# Patient Record
Sex: FEMALE | Race: BLACK OR AFRICAN AMERICAN | NOT HISPANIC OR LATINO | Employment: STUDENT | RURAL
[De-identification: names, ages, dates, MRNs, and addresses within clinical notes are randomized per-mention and may not be internally consistent; named-entity substitution may affect disease eponyms.]

---
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
## 2023-03-09 ENCOUNTER — TELEPHONE (OUTPATIENT)
Dept: FAMILY MEDICINE | Facility: CLINIC | Age: 18
End: 2023-03-09
Payer: MEDICAID

## 2023-03-09 ENCOUNTER — DOCUMENTATION ONLY (OUTPATIENT)
Dept: FAMILY MEDICINE | Facility: CLINIC | Age: 18
End: 2023-03-09
Payer: MEDICAID

## 2023-03-09 RX ORDER — TRIAMCINOLONE ACETONIDE 1 MG/G
1 OINTMENT TOPICAL 2 TIMES DAILY
Qty: 80 G | Refills: 0 | Status: SHIPPED | OUTPATIENT
Start: 2023-03-09 | End: 2023-04-18 | Stop reason: SDUPTHER

## 2023-03-09 NOTE — TELEPHONE ENCOUNTER
----- Message from Vernon Hansen sent at 3/9/2023  1:54 PM CST -----  Regarding: Patient is needing a medicine refill  Patient is needing a medicine refill of tricilicone  Please call  [phone]

## 2023-04-18 ENCOUNTER — OFFICE VISIT (OUTPATIENT)
Dept: FAMILY MEDICINE | Facility: CLINIC | Age: 18
End: 2023-04-18
Payer: MEDICAID

## 2023-04-18 VITALS
OXYGEN SATURATION: 98 % | WEIGHT: 180.38 LBS | HEIGHT: 63 IN | TEMPERATURE: 98 F | DIASTOLIC BLOOD PRESSURE: 74 MMHG | HEART RATE: 85 BPM | BODY MASS INDEX: 31.96 KG/M2 | SYSTOLIC BLOOD PRESSURE: 113 MMHG

## 2023-04-18 DIAGNOSIS — L30.9 ECZEMA, UNSPECIFIED TYPE: Primary | ICD-10-CM

## 2023-04-18 PROCEDURE — 3008F PR BODY MASS INDEX (BMI) DOCUMENTED: ICD-10-PCS | Mod: CPTII,,, | Performed by: NURSE PRACTITIONER

## 2023-04-18 PROCEDURE — 99213 PR OFFICE/OUTPT VISIT, EST, LEVL III, 20-29 MIN: ICD-10-PCS | Mod: ,,, | Performed by: NURSE PRACTITIONER

## 2023-04-18 PROCEDURE — 1160F PR REVIEW ALL MEDS BY PRESCRIBER/CLIN PHARMACIST DOCUMENTED: ICD-10-PCS | Mod: CPTII,,, | Performed by: NURSE PRACTITIONER

## 2023-04-18 PROCEDURE — 3078F PR MOST RECENT DIASTOLIC BLOOD PRESSURE < 80 MM HG: ICD-10-PCS | Mod: CPTII,,, | Performed by: NURSE PRACTITIONER

## 2023-04-18 PROCEDURE — 3078F DIAST BP <80 MM HG: CPT | Mod: CPTII,,, | Performed by: NURSE PRACTITIONER

## 2023-04-18 PROCEDURE — 1160F RVW MEDS BY RX/DR IN RCRD: CPT | Mod: CPTII,,, | Performed by: NURSE PRACTITIONER

## 2023-04-18 PROCEDURE — 3008F BODY MASS INDEX DOCD: CPT | Mod: CPTII,,, | Performed by: NURSE PRACTITIONER

## 2023-04-18 PROCEDURE — 3074F SYST BP LT 130 MM HG: CPT | Mod: CPTII,,, | Performed by: NURSE PRACTITIONER

## 2023-04-18 PROCEDURE — 1159F PR MEDICATION LIST DOCUMENTED IN MEDICAL RECORD: ICD-10-PCS | Mod: CPTII,,, | Performed by: NURSE PRACTITIONER

## 2023-04-18 PROCEDURE — 1159F MED LIST DOCD IN RCRD: CPT | Mod: CPTII,,, | Performed by: NURSE PRACTITIONER

## 2023-04-18 PROCEDURE — 3074F PR MOST RECENT SYSTOLIC BLOOD PRESSURE < 130 MM HG: ICD-10-PCS | Mod: CPTII,,, | Performed by: NURSE PRACTITIONER

## 2023-04-18 PROCEDURE — 99213 OFFICE O/P EST LOW 20 MIN: CPT | Mod: ,,, | Performed by: NURSE PRACTITIONER

## 2023-04-18 RX ORDER — TRIAMCINOLONE ACETONIDE 1 MG/G
1 OINTMENT TOPICAL 2 TIMES DAILY
Qty: 80 G | Refills: 6 | Status: SHIPPED | OUTPATIENT
Start: 2023-04-18 | End: 2023-08-17

## 2023-04-18 RX ORDER — CETIRIZINE HYDROCHLORIDE 10 MG/1
10 TABLET ORAL NIGHTLY
Qty: 90 TABLET | Refills: 3 | Status: SHIPPED | OUTPATIENT
Start: 2023-04-18 | End: 2024-04-17

## 2023-04-18 NOTE — LETTER
April 18, 2023      [facility]  [address]  [place] [address]  Phone: [phone]  Fax: [phone]       Patient: Cher Ramos   YOB: 2005  Date of Visit: 04/18/2023    To Whom It May Concern:    Sekou Ramos  was at [facility] on 04/18/2023. The patient may return to work/school on 4/18/2023 with no restrictions. If you have any questions or concerns, or if I can be of further assistance, please do not hesitate to contact me.    Sincerely,    FREDO Wilkins

## 2023-04-18 NOTE — PROGRESS NOTES
FREDO Wilkins   [facility]      PATIENT NAME: Cher Ramos  : 2005  DATE: 23  MRN: [id number]      Patient PCP Information       Provider PCP Type    FREDO Wilkins General            Reason for Visit / Chief Complaint: Medication Refill         History of Present Illness / Problem Focused Workflow     Cher Ramos presents to the clinic with Medication Refill     HPI  16 yo female who presents to clinic with request for medication refill for eczema steroid cream.   Patient reports scaly itchy rash to AC joint and flank area. Patient currently out of prev prescribed triamcinolone.   LMP 3/25/2023    Review of Systems     Review of Systems    Medical / Social / Family History     Past Medical History:   Diagnosis Date    Allergy     Eczema        Past Surgical History:   Procedure Laterality Date    WISDOM TOOTH EXTRACTION Bilateral        Social History  Ms.  reports that she has never smoked. She has never used smokeless tobacco. She reports that she does not drink alcohol and does not use drugs.    Family History  Ms.'s family history includes Diabetes in her maternal uncle; Hypertension in her maternal grandmother; Sickle cell trait in her maternal aunt, maternal grandfather, maternal uncle, and mother.    Medications and Allergies     Medications  Outpatient Medications Marked as Taking for the 23 encounter (Office Visit) with FREDO Wilkins   Medication Sig Dispense Refill    cetirizine (ZYRTEC) 10 MG tablet Take 1 tablet (10 mg total) by mouth every evening. 90 tablet 3    triamcinolone acetonide 0.1% (KENALOG) 0.1 % ointment Apply 1 application topically 2 (two) times daily. Apply to affected area 80 g 6    [DISCONTINUED] triamcinolone acetonide 0.1% (KENALOG) 0.1 % ointment Apply 1 application topically 2 (two) times daily. Apply to affected area 80 g 0       Allergies  Review of patient's allergies indicates:  No Known Allergies    Physical  "Examination     Vitals:    04/18/23 0852   BP: 113/74   BP Location: Left arm   Patient Position: Sitting   BP Method: Large (Automatic)   Pulse: 85   Temp: 97.6 °F (36.4 °C)   TempSrc: Oral   SpO2: 98%   Weight: 81.8 kg (180 lb 6 oz)   Height: 5' 3" (1.6 m)       Physical Exam  Vitals and nursing note reviewed.   Constitutional:       Appearance: Normal appearance. She is normal weight.   HENT:      Head: Normocephalic.      Right Ear: External ear normal.      Left Ear: External ear normal.      Nose: Nose normal.      Mouth/Throat:      Mouth: Mucous membranes are moist.   Eyes:      Extraocular Movements: Extraocular movements intact.      Conjunctiva/sclera: Conjunctivae normal.      Pupils: Pupils are equal, round, and reactive to light.   Cardiovascular:      Rate and Rhythm: Normal rate and regular rhythm.      Pulses: Normal pulses.      Heart sounds: Normal heart sounds. No murmur heard.  Pulmonary:      Effort: Pulmonary effort is normal.      Breath sounds: Normal breath sounds. No stridor. No wheezing or rhonchi.   Abdominal:      General: Bowel sounds are normal. There is no distension.      Palpations: Abdomen is soft. There is no mass.      Tenderness: There is no abdominal tenderness.   Musculoskeletal:         General: No swelling or tenderness. Normal range of motion.      Cervical back: Normal range of motion and neck supple.      Right lower leg: No edema.      Left lower leg: No edema.   Skin:     General: Skin is warm and dry.      Capillary Refill: Capillary refill takes less than 2 seconds.      Findings: Rash present.      Comments: Bilateral AC and left flank eczema   Neurological:      General: No focal deficit present.      Mental Status: She is alert and oriented to person, place, and time. Mental status is at baseline.      Cranial Nerves: No cranial nerve deficit.      Sensory: No sensory deficit.      Motor: No weakness.   Psychiatric:         Mood and Affect: Mood normal.         " Behavior: Behavior normal.         Thought Content: Thought content normal.         Judgment: Judgment normal.         No visits with results within 14 Day(s) from this visit.   Latest known visit with results is:   Clinical Support on 10/11/2022   Component Date Value Ref Range Status    TB Skin Test - 48 hr read 10/13/2022 Negative  Negative Corrected             Assessment and Plan (including Health Maintenance)         Plan:   Eczema, unspecified type  -     triamcinolone acetonide 0.1% (KENALOG) 0.1 % ointment; Apply 1 application topically 2 (two) times daily. Apply to affected area  Dispense: 80 g; Refill: 6  -     cetirizine (ZYRTEC) 10 MG tablet; Take 1 tablet (10 mg total) by mouth every evening.  Dispense: 90 tablet; Refill: 3     RTC prn  There are no Patient Instructions on file for this visit.       There are no preventive care reminders to display for this patient.    Most Recent Immunizations   Administered Date(s) Administered    DTaP 02/25/2008    DTaP / Hep B / IPV 07/13/2004    DTaP / HiB 02/08/2005    HPV 9-Valent 09/28/2021    Hepatitis A 04/20/2009    Hepatitis A, Pediatric/Adolescent, 2 Dose 02/23/2009    MMR 02/25/2008    Meningococcal B, recombinant 09/06/2022    Meningococcal Conjugate (MCV4O) 09/28/2021    Meningococcal Conjugate (MCV4P) 07/20/2017    PPD Test 10/11/2022    Tdap 07/20/2017    Varicella 02/25/2008        Problem List Items Addressed This Visit          Derm    Eczema - Primary    Relevant Medications    triamcinolone acetonide 0.1% (KENALOG) 0.1 % ointment    cetirizine (ZYRTEC) 10 MG tablet       Health Maintenance Topics with due status: Not Due       Topic Last Completion Date    TETANUS VACCINE 07/20/2017    Chlamydia Screening 09/06/2022       No future appointments.           Signature:  FREDO Wilkins  [facility]     Date of encounter: 4/18/23

## 2023-07-07 DIAGNOSIS — M25.561 CHRONIC PAIN OF RIGHT KNEE: Primary | ICD-10-CM

## 2023-07-07 DIAGNOSIS — G89.29 CHRONIC PAIN OF RIGHT KNEE: Primary | ICD-10-CM

## 2023-07-10 ENCOUNTER — HOSPITAL ENCOUNTER (OUTPATIENT)
Dept: RADIOLOGY | Facility: HOSPITAL | Age: 18
Discharge: HOME | End: 2023-07-10
Attending: NURSE PRACTITIONER
Payer: MEDICAID

## 2023-07-10 ENCOUNTER — OFFICE VISIT (OUTPATIENT)
Dept: ORTHOPEDICS | Facility: CLINIC | Age: 18
End: 2023-07-10
Payer: MEDICAID

## 2023-07-10 VITALS
RESPIRATION RATE: 20 BRPM | WEIGHT: 180 LBS | TEMPERATURE: 98 F | SYSTOLIC BLOOD PRESSURE: 116 MMHG | HEIGHT: 64 IN | DIASTOLIC BLOOD PRESSURE: 82 MMHG | BODY MASS INDEX: 30.73 KG/M2 | HEART RATE: 98 BPM | OXYGEN SATURATION: 100 %

## 2023-07-10 DIAGNOSIS — M23.91 INTERNAL DERANGEMENT OF RIGHT KNEE: Primary | ICD-10-CM

## 2023-07-10 DIAGNOSIS — M25.561 CHRONIC PAIN OF RIGHT KNEE: ICD-10-CM

## 2023-07-10 DIAGNOSIS — G89.29 CHRONIC PAIN OF RIGHT KNEE: ICD-10-CM

## 2023-07-10 PROCEDURE — 1159F MED LIST DOCD IN RCRD: CPT | Mod: CPTII,,, | Performed by: NURSE PRACTITIONER

## 2023-07-10 PROCEDURE — 3074F SYST BP LT 130 MM HG: CPT | Mod: CPTII,,, | Performed by: NURSE PRACTITIONER

## 2023-07-10 PROCEDURE — 99214 PR OFFICE/OUTPT VISIT, EST, LEVL IV, 30-39 MIN: ICD-10-PCS | Mod: S$PBB,,, | Performed by: NURSE PRACTITIONER

## 2023-07-10 PROCEDURE — 3079F PR MOST RECENT DIASTOLIC BLOOD PRESSURE 80-89 MM HG: ICD-10-PCS | Mod: CPTII,,, | Performed by: NURSE PRACTITIONER

## 2023-07-10 PROCEDURE — 99214 OFFICE O/P EST MOD 30 MIN: CPT | Mod: S$PBB,,, | Performed by: NURSE PRACTITIONER

## 2023-07-10 PROCEDURE — 1160F PR REVIEW ALL MEDS BY PRESCRIBER/CLIN PHARMACIST DOCUMENTED: ICD-10-PCS | Mod: CPTII,,, | Performed by: NURSE PRACTITIONER

## 2023-07-10 PROCEDURE — 3079F DIAST BP 80-89 MM HG: CPT | Mod: CPTII,,, | Performed by: NURSE PRACTITIONER

## 2023-07-10 PROCEDURE — 99215 OFFICE O/P EST HI 40 MIN: CPT | Mod: PBBFAC | Performed by: NURSE PRACTITIONER

## 2023-07-10 PROCEDURE — 3074F PR MOST RECENT SYSTOLIC BLOOD PRESSURE < 130 MM HG: ICD-10-PCS | Mod: CPTII,,, | Performed by: NURSE PRACTITIONER

## 2023-07-10 PROCEDURE — 73562 X-RAY EXAM OF KNEE 3: CPT | Mod: 26,RT,, | Performed by: STUDENT IN AN ORGANIZED HEALTH CARE EDUCATION/TRAINING PROGRAM

## 2023-07-10 PROCEDURE — 3008F BODY MASS INDEX DOCD: CPT | Mod: CPTII,,, | Performed by: NURSE PRACTITIONER

## 2023-07-10 PROCEDURE — 73562 XR KNEE AP LAT WITH SUNRISE RIGHT: ICD-10-PCS | Mod: 26,RT,, | Performed by: STUDENT IN AN ORGANIZED HEALTH CARE EDUCATION/TRAINING PROGRAM

## 2023-07-10 PROCEDURE — 73562 X-RAY EXAM OF KNEE 3: CPT | Mod: TC,RT

## 2023-07-10 PROCEDURE — 1160F RVW MEDS BY RX/DR IN RCRD: CPT | Mod: CPTII,,, | Performed by: NURSE PRACTITIONER

## 2023-07-10 PROCEDURE — 3008F PR BODY MASS INDEX (BMI) DOCUMENTED: ICD-10-PCS | Mod: CPTII,,, | Performed by: NURSE PRACTITIONER

## 2023-07-10 PROCEDURE — 1159F PR MEDICATION LIST DOCUMENTED IN MEDICAL RECORD: ICD-10-PCS | Mod: CPTII,,, | Performed by: NURSE PRACTITIONER

## 2023-07-10 RX ORDER — MELOXICAM 15 MG/1
15 TABLET ORAL DAILY PRN
Qty: 30 TABLET | Refills: 1 | Status: SHIPPED | OUTPATIENT
Start: 2023-07-10 | End: 2023-08-09

## 2023-07-10 NOTE — LETTER
July 10, 2023      [facility]  [address]  [facility] [address]  Phone: [phone]  Fax: [phone]       Patient: Cher Ramos   YOB: 2005  Date of Visit: 07/10/2023    To Whom It May Concern:    Sekou Ramos  was at [facility] on 07/10/2023. She will NOT be able to return to work until after physical therapy and follow up visit with orthopedic physician (TBD). If you have any questions or concerns, or if I can be of further assistance, please do not hesitate to contact me.    Sincerely,    Yaquelin De La Paz MA

## 2023-07-10 NOTE — PROGRESS NOTES
See Plan of Care     Sup Visit performed today with CORTNEY Medellin and CORTNEY Phillip.  All goals and treatment plan reviewed. Will work toward completion of all goals set.     First Treatment May Include :     E Stim and Game Ready for pain and inflammation     Exercises as follows :     Bike/NuStep  SB  Hamstring Stretch on Stairs   Knee Flexion Stretch on Stairs     Supine :   Quad Sets  Heel Slides   Short Arc Quads   Straight Leg Raises   Hip Abduction   Bridging     Sitting :  Marching   LAQs  Hamstring Curls   Hip Abd/Add  Ankle Pumps     Standing :  Standing Marches  Squat to chair/Shallow standing knee bends  Hip Abduction   Heel Raises     Single Leg Stance - Firm   Single Leg Stance - Foam

## 2023-07-10 NOTE — PATIENT INSTRUCTIONS
Safety guidelines and activity restrictions discussed with the patient.  Verbalized understanding.  Since she does have pain with weight-bearing we will issue her a new set of crutches today.  We will schedule her for physical therapy for range of motion strengthening exercises right knee 2 to 3 times a week x6 weeks.  If she fails to improve she is to call back for scheduling consideration MRI examination.  Prescription Mobic 15 mg 1 p.o. daily p.r.n. right knee pain dispense 30 with 1 refills sent to [facility] pharmacy [facility] per patient request.

## 2023-07-10 NOTE — PROGRESS NOTES
17-year-old female presents ambulatory to orthopedic clinic with complaint of pain in her right knee that has been ongoing.  It is gotten worse over the last 2 days.  She has a perception of knee dislocating several times a day.  She was able to reduce it on her own.  Denies any recent injury or trauma.  States that she bends her knee well.  Her difficulty is trying to fully extend her knee.  She is not had the benefit of NSAID medications.  She does have crutches to however they are in ill repair she is requesting new crutches.  She was a over-the-counter type knee brace on her right knee.      X-ray: X-rays today of the right knee AP, lateral and sunrise view three views shows no evidence of acute fracture, dislocation or pathologic bone noted.    PE:  Physical exam she is noted be ambulating nonweightbearing right lower extremity with crutches.  She was noted have a knee brace in place.  Knee brace removed.  Physical exam right knee skin is warm, dry and intact.  No soft tissue swelling noted.  There is minimal intra-articular effusion appreciated clinically.  Active range of motion she lacks approximately 30° full extension.  Flexion is able to flex to approximately 100°.  There was excellent ligamentous balance instability noted clinically.  Passive range of motion extension lacks approximately 20° for full extension with further flexion to approximately 95°.    Impression:  Internal derangement right knee    Plan:  Safety guidelines and activity restrictions discussed with the patient.  Verbalized understanding.  Since she does have pain with weight-bearing we will issue her a new set of crutches today.  We will schedule her for physical therapy for range of motion strengthening exercises right knee 2 to 3 times a week x6 weeks.  If she fails to improve she is to call back for scheduling consideration MRI examination.  Prescription Mobic 15 mg 1 p.o. daily p.r.n. right knee pain dispense 30 with 1 refills  sent to [facility] pharmacy [facility] per patient request.

## 2023-07-11 ENCOUNTER — CLINICAL SUPPORT (OUTPATIENT)
Dept: REHABILITATION | Facility: HOSPITAL | Age: 18
End: 2023-07-11
Payer: MEDICAID

## 2023-07-11 DIAGNOSIS — R29.898 WEAKNESS OF RIGHT LEG: ICD-10-CM

## 2023-07-11 DIAGNOSIS — M23.91 ACUTE INTERNAL DERANGEMENT OF RIGHT KNEE: Primary | ICD-10-CM

## 2023-07-11 DIAGNOSIS — M23.91 INTERNAL DERANGEMENT OF RIGHT KNEE: ICD-10-CM

## 2023-07-11 DIAGNOSIS — M25.661 DECREASED RANGE OF MOTION (ROM) OF RIGHT KNEE: ICD-10-CM

## 2023-07-11 DIAGNOSIS — M25.561 ACUTE PAIN OF RIGHT KNEE: ICD-10-CM

## 2023-07-11 PROCEDURE — 97161 PT EVAL LOW COMPLEX 20 MIN: CPT

## 2023-07-11 NOTE — PLAN OF CARE
"[facility]   Physical Therapy Initial Evaluation      Name: Cher Ramos  Clinic Number: [id number]    Therapy Diagnosis: Internal Derangement Right Knee  Physician: Eugenio Aviles FNP    Physician Orders: PT Eval and Treat   Medical Diagnosis from Referral: Right Knee Pain  Evaluation Date: 7/11/2023  Authorization Period Expiration: Medicaid Managed   Plan of Care Expiration: 9/8/2023   Progress Note Due: As Needed   Visit # / Visits authorized: 1/ Medicaid Managed    FOTO: 69/100    Medicaid was Approved and is as follows :      [id number]    Based on the information submitted, the followingservices have been approved:  Procedure Code Effective Date Expiration Date Requested Units Approved Units  39924 07/17/2023 09/08/2023 32 32  [id number] 07/17/2023 09/08/2023 16 16  [id number] 07/17/2023 09/08/2023 16 16  [id number] 07/17/2023 09/08/2023 16 16  [id number] 07/17/2023 09/08/2023 16 16  The treatmentauthorization number (TAN) for thisapproval is: [id number]    Precautions: She is allowed to weight bear as tolerated     Time In: 11:00 am   Time Out: 11:57 am   Total Appointment Time (timed & untimed codes): 57 minutes    Subjective     Date of onset: 7/7/2023    History of current condition - Cher reports: She was playing kickball one year ago and fell in a hole. Her knee popped at that time and was painful but she never saw a doctor. It has given her problems off and on since that time, but she has been able to walk with no difficulty. However, on Friday, 7/7/2023, she was standing at work and pivoted on her Right knee and felt it "pop." She had severe pain and swelling at that time and was unable to bear weight on that leg at all.  Most of her pain is medial. At this time, she can bend the knee without too much pain but she is unable to straighten it. She is on crutches today and states she is unable to put much pressure through that Right knee when she tries to walk. She has pain that " is usually around 7/10. She has seen Dr Eugenio Aviles in Dr Lozano's office. No MRI has been done but she was referred to Outpatient Physical Therapy.     Patient saw Eugenio Aviles at Dr Lozano's office on 7/10/2023. MD Note States in Part :   17-year-old female presents ambulatory to orthopedic clinic with complaint of pain in her right knee that has been ongoing.  It is gotten worse over the last 2 days.  She has a perception of knee dislocating several times a day.  She was able to reduce it on her own.  Denies any recent injury or trauma.  States that she bends her knee well.  Her difficulty is trying to fully extend her knee.  She is not had the benefit of NSAID medications.  She does have crutches to however they are in ill repair she is requesting new crutches.  She was a over-the-counter type knee brace on her right knee.    Active range of motion she lacks approximately 30° full extension. Flexion is able to flex to approximately 100°. There was excellent ligamentous balance instability noted clinically. Passive range of motion extension lacks approximately 20° for full extension with further flexion to approximately 95°.   Safety guidelines and activity restrictions discussed with the patient. Verbalized understanding. Since she does have pain with weight-bearing we will issue her a new set of crutches today. We will schedule her for physical therapy for range of motion strengthening exercises right knee 2 to 3 times a week x6 weeks. If she fails to improve she is to call back for scheduling consideration MRI examination. Prescription Mobic 15 mg 1 p.o. daily p.r.n.       Falls: None recently     Imaging: X Ray:   No acute fracture or dislocation.  No joint abnormality.  No radiopaque foreign bodies.       Prior Therapy: None for this   Social History:  lives with an adult   Occupation: [employer]   Prior Level of Function: Independent and Pain free  Current Level of Function:  "Unable to stand and walk on the Right Knee    Pain:  Current 7/10, worst 10/10, best 6/10   Location: right knee    Description: Aching, Throbbing, Tight, and Sharp  Aggravating Factors: trying to extend the Right Knee and any attempts to weight bear  Easing Factors: relaxation, pain medication, ice, and rest    Patients goals: "I want to figure out what is wrong with this knee and get it fixed."     Medical History:   Past Medical History:   Diagnosis Date    Allergy     Eczema        Surgical History:   Cher Ramos  has a past surgical history that includes [place] tooth extraction (Bilateral).    Medications:   Cher has a current medication list which includes the following prescription(s): cetirizine, meloxicam, and triamcinolone acetonide 0.1%.    Allergies:   Review of patient's allergies indicates:  No Known Allergies     Objective          Observation : Patient here today for Outpatient Physical Therapy Evaluation. She has bilateral axillary crutches as she is unable to bear weight through the Right Lower Extremity at this time. Patient is wearing an over the counter supportive knee brace that she states helps some with the pain.     Range of Motion/Strength :                  Left Extremity                                                                        Right Extremity   AROM PROM Strength  Location  AROM    PROM   Strength                          140   5/5   Knee    Flexion (140)  95  100  3-/5    +2   5/5                Extension (0)  -30  -20  5-/5             Normal      Ankle   Dorsiflexion (20)  Normal    5/5                     Plantar Flexion (50)                        Inversion (35)                        Eversion (25)                 Knee Special Tests :     B. Knee  Lochman's test: right Negative left Negative  Anterior drawer: right Negative left Negative  Posterior drawer: right Negative left Negative  Varus stress test: right Negative left Negative  Valgus stress test: " "right Negative left Negative  PFJ grind test: right Positive left Negative  McMurrays: right Positive left Negative  Thessaly's: right Positive left Negative    Functional Impairments :  Patient is unable to weight bear at this time therefore she is using axillary crutches for a swing through gait pattern.     Palpation : She is tender to palpation along the medial joint line and the pes anserine. Medial, Superior, and Inferior border of the patella is also very tender to palpation.    Limitation/Restriction for FOTO Intake Knee Survey    Therapist reviewed FOTO scores for Cher Ramos on 7/11/2023.   FOTO documents entered into [employer] - see Media section.    Limitation Score: 31%         Patient Education     Education provided:   - Discussed the findings from the Evaluation and Reviewed the Plan of Care    Assessment     Cher is a 17 y.o. female referred to outpatient Physical Therapy with a medical diagnosis of Internal derangement of Right Knee causing Right knee pain. Cher reports: She was playing kickball one year ago and fell in a hole. Her knee popped at that time and was painful but she never saw a doctor. It has given her problems off and on since that time, but she has been able to walk with no difficulty. However, on Friday, 7/7/2023, she was standing at work and pivoted on her Right knee and felt it "pop." She had severe pain and swelling at that time and was unable to bear weight on that leg at all.  Most of her pain is medial. At this time, she can bend the knee without too much pain but she is unable to straighten it. She is on crutches today and states she is unable to put much pressure through that Right knee when she tries to walk. She has pain that is usually around 7/10. She has seen Dr Eugenio Aviles in Dr Lozano's office. No MRI has been done but she was referred to Outpatient Physical Therapy.   Patient presents with decreased Range of Motion in the Right knee, especially in " regards to Extension. She is unable to get the Right Knee straight at all and lacks 20 degrees of extension even with assistance of the Therapist. Patient was positive for possible medial meniscus tear during the special tests during the Evaluation. Unable to determine if the patella is stable or not at this time as she is very guarded and painful at this time. She is tender to palpation along the medial joint line and the pes anserine. Medial, Superior, and Inferior border of the patella is also very tender to palpation. Feel patient would benefit from MRI or other testing to help determine the cause of her pain and dysfunction. She is unable to bear weight through the Right Lower Extremity at this time and is having to use axillary crutches with swing through gait for mobility.   Patient will require Physical Therapy Intervention to address all deficits and work toward completion of all goals set. Therapist will refer patient back to MD upon completion of Therapy for further assessment and possible MRI if pain and dysfunction persist.     Patient prognosis is Fair.   Patient will benefit from skilled outpatient Physical Therapy to address the deficits stated above and in the chart below, provide patient /family education, and to maximize patientt's level of independence.     Plan of care discussed with patient: Yes  Patient's spiritual, cultural and educational needs considered and patient is agreeable to the plan of care and goals as stated below:     Anticipated Barriers for therapy: Unknown cause of pain    Medical Necessity is demonstrated by the following  History  Co-morbidities and personal factors that may impact the plan of care [] LOW: no personal factors / co-morbidities  [x] MODERATE: 1-2 personal factors / co-morbidities  [] HIGH: 3+ personal factors / co-morbidities    Moderate / High Support Documentation:   Co-morbidities affecting plan of care: Previous knee injury 1 year ago     Personal Factors:    Unknown cause of pain     Examination  Body Structures and Functions, activity limitations and participation restrictions that may impact the plan of care [x] LOW: addressing 1-2 elements  [] MODERATE: 3+ elements  [] HIGH: 4+ elements (please support below)    Moderate / High Support Documentation: N/A     Clinical Presentation [] LOW: stable  [x] MODERATE: Evolving - May need surgery if this pain does not resolve  [] HIGH: Unstable     Decision Making/ Complexity Score: low       Goals:  Short Term Goals: 4 weeks   1. Independent with Home Exercise Program   2. Increase Right Knee Range of Motion to 0 Degrees to 120 Degrees  3. Increase Right Knee Strength to grossly 4/5  4. Patient will ambulate 500 feet with one crutch and with complaints of pain Less than or Equal to 4/10.      Long Term Goals: 8 weeks   1. Patient will increase Right Knee Strength to grossly 5/5  2. Patient will ambulate 1000+ feet without an assistive device and with complaints of pain Less than or Equal to 3/10.   3. Refer patient back to MD at end of Therapy for further assessment and possible MRI if pain and dysfunction persist.          Plan     Plan of care Certification: 7/11/2023 to 9/8/2023.    Outpatient Physical Therapy 2 times weekly for 8 weeks to include the following interventions: Electrical Stimulation to increase strength and decrease complaints of pain and inflammation as able, Game Ready/Vasopneumatic, Manual Therapy, Moist Heat/ Ice, Neuromuscular Re-ed, Patient Education, Therapeutic Activities, and Therapeutic Exercise.     ERIKA ROME PT, DPT        ----------------------------------------------------------------------------------------------------------------------------------------------------------------    Medicaid requirements:  Plan of Care Dates:  7/11/2023 to 9/8/2023.  CPT codes and number of units needed per visit:  66756 : 4 Units/week x 8 weeks =32 units  76232 : 2 Units/week x 8 weeks = 16  units  11419 : 2 Units/week x 8 weeks = 16 units   42623 : 2 Units/week x 8 weeks = 16 units   14106 : 2 Units/week x 8 weeks = 16 units   Last face to face visit with MD: 7/10/2023  Goals:  Short Term Goals: 4 weeks   1. Independent with Home Exercise Program   2. Increase Right Knee Range of Motion to 0 Degrees to 120 Degrees  3. Increase Right Knee Strength to grossly 4/5  4. Patient will ambulate 500 feet with one crutch and with complaints of pain Less than or Equal to 4/10.      Long Term Goals: 8 weeks   1. Patient will increase Right Knee Strength to grossly 5/5  2. Patient will ambulate 1000+ feet without an assistive device and with complaints of pain Less than or Equal to 3/10.   3. Refer patient back to MD at end of Therapy for further assessment and possible MRI if pain and dysfunction persist.      Home exercise program and patient compliance: Home Exercise Program initiated at time of Evaluation. This will be upgraded during upcoming treatment sessions. Will educate patient on proper form and compliance. Patient will be Independent and compliant with the Home Exercise Program.    Discharge Plan: Will work toward completion of all goals set and patient will be Independent and compliant with the Home Exercise Program.

## 2023-07-17 ENCOUNTER — CLINICAL SUPPORT (OUTPATIENT)
Dept: REHABILITATION | Facility: HOSPITAL | Age: 18
End: 2023-07-17
Payer: MEDICAID

## 2023-07-17 DIAGNOSIS — M25.661 DECREASED RANGE OF MOTION (ROM) OF RIGHT KNEE: ICD-10-CM

## 2023-07-17 DIAGNOSIS — M23.91 ACUTE INTERNAL DERANGEMENT OF RIGHT KNEE: Primary | ICD-10-CM

## 2023-07-17 DIAGNOSIS — M25.561 ACUTE PAIN OF RIGHT KNEE: ICD-10-CM

## 2023-07-17 DIAGNOSIS — R29.898 WEAKNESS OF RIGHT LEG: ICD-10-CM

## 2023-07-17 PROCEDURE — 97110 THERAPEUTIC EXERCISES: CPT

## 2023-07-17 PROCEDURE — 97016 VASOPNEUMATIC DEVICE THERAPY: CPT

## 2023-07-17 PROCEDURE — 97112 NEUROMUSCULAR REEDUCATION: CPT

## 2023-07-17 NOTE — PROGRESS NOTES
[facility]   Physical Therapy Treatment Note      Name: Cher Ramos  Clinic Number: [id number]    Therapy Diagnosis:   Encounter Diagnoses   Name Primary?    Acute internal derangement of right knee Yes    Decreased range of motion (ROM) of right knee     Weakness of right leg     Acute pain of right knee      Physician: Eugenio Aviles FNP    Visit Date: 7/17/2023      Physician Orders: PT Eval and Treat   Medical Diagnosis from Referral: Right Knee Pain  Evaluation Date: 7/11/2023  Authorization Period Expiration: Medicaid Managed   Plan of Care Expiration: 9/8/2023   Progress Note Due: As Needed   Visit # / Visits authorized: 2/ Medicaid Managed    FOTO: 69/100  PTA Visit #: 0/5     Time In: 1118  Time Out: 1218  Total Billable Time: 60 minutes    Subjective     Pt reports: I don't have pain unless I try to extend my knee.    She was compliant with home exercise program.    Pain: 0/10  Location: right knee      Objective      Extension AROM -12  Quad lag -20  Knee Flexion 120 AROM, 125 PROM    Treatment     Cher received the treatments listed below:      therapeutic exercises to develop strength, endurance, ROM, flexibility, posture, and core stabilization for 15 minutes including:  Bike x 5 min  Slant board stretch 3 x 30 sec  Heel slides with overpressure x 10    neuromuscular re-education activities to improve: Balance, Coordination, Kinesthetic, Sense, Proprioception, and Posture for 25 minutes. The following activities were included:  Quad set x 20  Short arc quad x 20 w/strap  Straight leg raise x 20 with strap  Bridging x 20  Right hip abduction x 20    supervised modalities after being cleared for contradictions: IFC Electrical Stimulation:  Cher received IFC Electrical Stimulation for pain control applied to the right knee. Pt received stimulation at 100 % scan at a frequency of 12 for 15 minutes. Cher tolderated treatment well without any adverse  effects.    Vasopneumatic during IFC x 10 minutes to right knee          therapeutic activities to improve functional performance for -  minutes, including:  -  manual therapy techniques: Joint mobilizations, Myofacial release, and Soft tissue Mobilization were applied to the: - for - minutes, including:  -  gait training to improve functional mobility and safety for -  minutes, including:  -    direct contact modalities after being cleared for contraindications:     hot pack for - minutes to -.    cold pack for - minutes to -.    Patient Education and Home Exercises       Education provided:   - added to home exercise program 7/17    Written Home Exercises Provided: yes. Exercises were reviewed and Cher was able to demonstrate them prior to the end of the session.  Cher demonstrated good  understanding of the education provided. See EMR under Patient Instructions for exercises provided during therapy sessions    Assessment     Patient arrived with knee brace on and bilateral crutches. Patient states she feels all stretches along the anterior knee. The bridge exercise caused 'twinging' but patient states it is a good feeling since it feels like she is strengthening. Patient has made gains with range of motion and with quad lag measurements. Added to home exercise program today. Finished with modalities for pain and inflammation control. Progress as able.     Cher Is progressing well towards her goals.   Pt prognosis is Good.     Pt will continue to benefit from skilled outpatient physical therapy to address the deficits listed in the problem list box on initial evaluation, provide pt/family education and to maximize pt's level of independence in the home and community environment.     Pt's spiritual, cultural and educational needs considered and pt agreeable to plan of care and goals.     Anticipated barriers to physical therapy: none     Goals: Short Term   Goals: 4 weeks   1. Independent with Home  Exercise Program   2. Increase Right Knee Range of Motion to 0 Degrees to 120 Degrees  3. Increase Right Knee Strength to grossly 4/5  4. Patient will ambulate 500 feet with one crutch and with complaints of pain Less than or Equal to 4/10.       Long Term Goals: 8 weeks   1. Patient will increase Right Knee Strength to grossly 5/5  2. Patient will ambulate 1000+ feet without an assistive device and with complaints of pain Less than or Equal to 3/10.   3. Refer patient back to MD at end of Therapy for further assessment and possible MRI if pain and dysfunction persist.        Plan     Continue to progress toward goals     ADAM BLANDON, PT      (Copied from initial evaluation)  See Plan of Care   First Treatment May Include :     E Stim and Game Ready for pain and inflammation     Exercises as follows :     Bike/NuStep  SB  Hamstring Stretch on Stairs   Knee Flexion Stretch on Stairs     Supine :   Quad Sets  Heel Slides   Short Arc Quads   Straight Leg Raises   Hip Abduction   Bridging     Sitting :  Marching   LAQs  Hamstring Curls   Hip Abd/Add  Ankle Pumps     Standing :  Standing Marches  Squat to chair/Shallow standing knee bends  Hip Abduction   Heel Raises     Single Leg Stance - Firm   Single Leg Stance - Foam

## 2023-07-19 ENCOUNTER — CLINICAL SUPPORT (OUTPATIENT)
Dept: REHABILITATION | Facility: HOSPITAL | Age: 18
End: 2023-07-19
Payer: MEDICAID

## 2023-07-19 DIAGNOSIS — M25.661 DECREASED RANGE OF MOTION (ROM) OF RIGHT KNEE: ICD-10-CM

## 2023-07-19 DIAGNOSIS — M23.91 ACUTE INTERNAL DERANGEMENT OF RIGHT KNEE: Primary | ICD-10-CM

## 2023-07-19 DIAGNOSIS — M25.561 ACUTE PAIN OF RIGHT KNEE: ICD-10-CM

## 2023-07-19 DIAGNOSIS — R29.898 WEAKNESS OF RIGHT LEG: ICD-10-CM

## 2023-07-19 PROCEDURE — 97110 THERAPEUTIC EXERCISES: CPT | Mod: CQ

## 2023-07-19 PROCEDURE — 97112 NEUROMUSCULAR REEDUCATION: CPT | Mod: CQ

## 2023-07-19 PROCEDURE — 97016 VASOPNEUMATIC DEVICE THERAPY: CPT | Mod: CQ

## 2023-07-19 PROCEDURE — 97116 GAIT TRAINING THERAPY: CPT | Mod: CQ

## 2023-07-19 NOTE — PROGRESS NOTES
[facility]   Physical Therapy Treatment Note      Name: Cher Ramos  Clinic Number: [id number]    Therapy Diagnosis:   Encounter Diagnoses   Name Primary?    Acute internal derangement of right knee Yes    Decreased range of motion (ROM) of right knee     Weakness of right leg     Acute pain of right knee      Physician: Eugenio Aviles FNP    Visit Date: 7/19/2023      Physician Orders: PT Eval and Treat   Medical Diagnosis from Referral: Right Knee Pain  Evaluation Date: 7/11/2023  Authorization Period Expiration: Medicaid Managed   Plan of Care Expiration: 9/8/2023   Progress Note Due: As Needed   Visit # / Visits authorized: 3/ Medicaid Managed    FOTO: 69/100  PTA Visit #: 1/5     Time In: 1:00 pm  Time Out: 1:57 pm  Total Billable Time: 57  minutes    Subjective     Pt reports: I feel better after doing the exercises I got last visit.     She was compliant with home exercise program.    Pain: 1/10  Location: right knee      Objective      Extension AROM -9  Quad lag -17  Knee Flexion 125 AROM, 130 PROM    Treatment     Cher received the treatments listed below:      therapeutic exercises to develop strength, endurance, ROM, flexibility, posture, and core stabilization for 15 minutes including:  nustep x 5 min  Slant board stretch 3 x 30 second  Hamstring stretch on step 2 x 20 second hold   Lunge stretch on step 2 x 20 second hold   Knee flexion ball rolls with red peanut x 20  Heel raises at rail using bilateral upper extremity to offload weight x 20    neuromuscular re-education activities to improve: Balance, Coordination, Kinesthetic, Sense, Proprioception, and Posture for 15 minutes. The following activities were included:  Quad set x 20 with 3 second hold   Short arc quad x 20 w/strap   Straight leg raise x 20 with strap  Bridging x 20 with green band added 7/19    supervised modalities after being cleared for contradictions: IFC Electrical Stimulation:   "Cher received IFC Electrical Stimulation for pain control applied to the right knee. Pt received stimulation at 100 % scan at a frequency of 12 for 15 minutes. Cher tolderated treatment well without any adverse effects.    Vasopneumatic during IFC x 15 minutes to right knee      therapeutic activities to improve functional performance for -  minutes, including:  -  manual therapy techniques: Joint mobilizations, Myofacial release, and Soft tissue Mobilization were applied to the: - for - minutes, including:  -  gait training to improve functional mobility and safety for 8 minutes, including:  Using axillary crutch on left side with emphasis on advancing RLE and crutch on left side together. Cues to heel strike and not land with foot flat     direct contact modalities after being cleared for contraindications:     hot pack for - minutes to -.    cold pack for - minutes to -.    Patient Education and Home Exercises       Education provided:   - added to home exercise program 7/17    Written Home Exercises Provided: yes. Exercises were reviewed and Cher was able to demonstrate them prior to the end of the session.  Cher demonstrated good  understanding of the education provided. See EMR under Patient Instructions for exercises provided during therapy sessions    Assessment     Patient arrived with single axillary crutch today and reports the exercises given at previous visit have helped her knee move more. She did arrive with the crutch on the right side, so therapist corrected Patient on use of crutch placed on the left to offload weight from RLE while ambulating. Cues needed for heel strike and toe off since she tended to compensate with foot flat landing due to lacking full extension in the right knee. She did state this correction helped her to walk better using the crutch on the left with less pain noted in the right knee. She did not report any "twinges" in the knee when we did the bridging today " "and even tolerated addition of green Theraband to bridges. Finished with modalities for pain and inflammation control. Progress as able when Patient returns.     PMH from evaluation:  Cher is a 17 y.o. female referred to outpatient Physical Therapy with a medical diagnosis of Internal derangement of Right Knee causing Right knee pain. Cher reports: She was playing kickball one year ago and fell in a hole. Her knee popped at that time and was painful but she never saw a doctor. It has given her problems off and on since that time, but she has been able to walk with no difficulty. However, on Friday, 7/7/2023, she was standing at work and pivoted on her Right knee and felt it "pop." She had severe pain and swelling at that time and was unable to bear weight on that leg at all.  Most of her pain is medial. At this time, she can bend the knee without too much pain but she is unable to straighten it. She is on crutches today and states she is unable to put much pressure through that Right knee when she tries to walk. She has pain that is usually around 7/10. She has seen Dr Eugenio Aviles in Dr Lozano's office. No MRI has been done but she was referred to Outpatient Physical Therapy.   Patient presents with decreased Range of Motion in the Right knee, especially in regards to Extension. She is unable to get the Right Knee straight at all and lacks 20 degrees of extension even with assistance of the Therapist. Patient was positive for possible medial meniscus tear during the special tests during the Evaluation. Unable to determine if the patella is stable or not at this time as she is very guarded and painful at this time. She is tender to palpation along the medial joint line and the pes anserine. Medial, Superior, and Inferior border of the patella is also very tender to palpation. Feel patient would benefit from MRI or other testing to help determine the cause of her pain and dysfunction. She is unable to " bear weight through the Right Lower Extremity at this time and is having to use axillary crutches with swing through gait for mobility.   Cher Is progressing well towards her goals.   Pt prognosis is Good.     Pt will continue to benefit from skilled outpatient physical therapy to address the deficits listed in the problem list box on initial evaluation, provide pt/family education and to maximize pt's level of independence in the home and community environment.     Pt's spiritual, cultural and educational needs considered and pt agreeable to plan of care and goals.     Anticipated barriers to physical therapy: none     Goals: Short Term   Goals: 4 weeks   1. Independent with Home Exercise Program   2. Increase Right Knee Range of Motion to 0 Degrees to 120 Degrees  3. Increase Right Knee Strength to grossly 4/5  4. Patient will ambulate 500 feet with one crutch and with complaints of pain Less than or Equal to 4/10.       Long Term Goals: 8 weeks   1. Patient will increase Right Knee Strength to grossly 5/5  2. Patient will ambulate 1000+ feet without an assistive device and with complaints of pain Less than or Equal to 3/10.   3. Refer patient back to MD at end of Therapy for further assessment and possible MRI if pain and dysfunction persist.        Plan      Plan of care Certification: 7/11/2023 to 9/8/2023.     Outpatient Physical Therapy 2 times weekly for 8 weeks to include the following interventions: Electrical Stimulation to increase strength and decrease complaints of pain and inflammation as able, Game Ready/Vasopneumatic, Manual Therapy, Moist Heat/ Ice, Neuromuscular Re-ed, Patient Education, Therapeutic Activities, and Therapeutic Exercise.   Continue to progress toward goals     Seng Bright, PTA    7/19/2023

## 2023-07-25 ENCOUNTER — CLINICAL SUPPORT (OUTPATIENT)
Dept: REHABILITATION | Facility: HOSPITAL | Age: 18
End: 2023-07-25
Payer: MEDICAID

## 2023-07-25 DIAGNOSIS — M25.561 ACUTE PAIN OF RIGHT KNEE: ICD-10-CM

## 2023-07-25 DIAGNOSIS — M23.91 ACUTE INTERNAL DERANGEMENT OF RIGHT KNEE: Primary | ICD-10-CM

## 2023-07-25 DIAGNOSIS — R29.898 WEAKNESS OF RIGHT LEG: ICD-10-CM

## 2023-07-25 DIAGNOSIS — M25.661 DECREASED RANGE OF MOTION (ROM) OF RIGHT KNEE: ICD-10-CM

## 2023-07-25 PROCEDURE — 97112 NEUROMUSCULAR REEDUCATION: CPT | Mod: CQ

## 2023-07-25 PROCEDURE — 97110 THERAPEUTIC EXERCISES: CPT | Mod: CQ

## 2023-07-25 PROCEDURE — 97014 ELECTRIC STIMULATION THERAPY: CPT | Mod: CQ

## 2023-07-25 NOTE — PROGRESS NOTES
[facility]   Physical Therapy Treatment Note      Name: Cher Ramos  Clinic Number: [id number]    Therapy Diagnosis:   Encounter Diagnoses   Name Primary?    Acute internal derangement of right knee Yes    Decreased range of motion (ROM) of right knee     Weakness of right leg     Acute pain of right knee      Physician: Eugenio Aviles FNP    Visit Date: 7/25/2023      Physician Orders: PT Eval and Treat   Medical Diagnosis from Referral: Right Knee Pain  Evaluation Date: 7/11/2023  Authorization Period Expiration: Medicaid Managed   Plan of Care Expiration: 9/8/2023   Progress Note Due: As Needed   Visit # / Visits authorized: 4/ Medicaid Managed    FOTO: 69/100  PTA Visit #: 1/5     Time In: 11:32 am  Time Out: 12:18 pm  Total Billable Time: 46 minutes    Subjective     Pt reports: I feel ok today but am sore due to trying to walk more and did not use my crutch in the store yesterday so my calf is stiff and sore.  States she iced it last night.     She was compliant with home exercise program.    Pain: 2/10  Location: right knee      Objective      Extension AROM -10  Quad lag -13  Knee Flexion 125 AROM, 130 PROM    Treatment     Cher received the treatments listed below:      therapeutic exercises to develop strength, endurance, ROM, flexibility, posture, and core stabilization for 11 minutes including:  nustep x 6 min  Slant board stretch 3 x 30 second  Hamstring stretch in supine with green strap 3 x 20 second hold     neuromuscular re-education activities to improve: Balance, Coordination, Kinesthetic, Sense, Proprioception, and Posture for 12 minutes. The following activities were included:  Quad sets x 6 minutes  with neuromuscular electrical stimulation   Straight leg raise x 6 minutes with neuromuscular electrical stimulation     Patient received the following supervised modalities after being cleared for contradictions: Pre-Mod Electrical Stimulation:   Patient received Pre-Mod Electrical Stimulation for pain control applied to the right knee. Pt received stimulation at a frequency of  Hz for 15 minutes. Patient tolerated treatment well without any adverse effects.  cold pack for 15 minutes to right knee in conjunction with PREMOD    gait training to improve functional mobility and safety for 0 minutes, including:  Using axillary crutch on left side with emphasis on advancing RLE and crutch on left side together. Cues to heel strike and not land with foot flat     direct contact modalities after being cleared for contraindications:     hot pack for - minutes to -.        Patient Education and Home Exercises       Education provided:   - added to home exercise program 7/17    Written Home Exercises Provided: yes. Exercises were reviewed and Cher was able to demonstrate them prior to the end of the session.  Cher demonstrated good  understanding of the education provided. See EMR under Patient Instructions for exercises provided during therapy sessions    Assessment     Patient arrived with single axillary crutch today and reports she has some medial knee pain today and states she is sore from going into a store yesterday without the crutch. Therapist did opt to try russian neuromuscular electrical stimulation today with quad sets and SLR using the strap. She tolerated this very well and did not state any increase in pain. Patient's mother came with her today and was inquiring about her progress so therapist explained the importance of maximizing her functional mobility and strength in that right knee due to the severity of the injury. Finished with PREMOD and CP to right knee for pain and inflammation control. Progress as able when Patient returns.     PMH from evaluation:  Cher is a 17 y.o. female referred to outpatient Physical Therapy with a medical diagnosis of Internal derangement of Right Knee causing Right knee pain. Cher reports: She  "was playing kickball one year ago and fell in a hole. Her knee popped at that time and was painful but she never saw a doctor. It has given her problems off and on since that time, but she has been able to walk with no difficulty. However, on Friday, 7/7/2023, she was standing at work and pivoted on her Right knee and felt it "pop." She had severe pain and swelling at that time and was unable to bear weight on that leg at all.  Most of her pain is medial. At this time, she can bend the knee without too much pain but she is unable to straighten it. She is on crutches today and states she is unable to put much pressure through that Right knee when she tries to walk. She has pain that is usually around 7/10. She has seen Dr Eugenio Aviles in Dr Lozano's office. No MRI has been done but she was referred to Outpatient Physical Therapy.   Patient presents with decreased Range of Motion in the Right knee, especially in regards to Extension. She is unable to get the Right Knee straight at all and lacks 20 degrees of extension even with assistance of the Therapist. Patient was positive for possible medial meniscus tear during the special tests during the Evaluation. Unable to determine if the patella is stable or not at this time as she is very guarded and painful at this time. She is tender to palpation along the medial joint line and the pes anserine. Medial, Superior, and Inferior border of the patella is also very tender to palpation. Feel patient would benefit from MRI or other testing to help determine the cause of her pain and dysfunction. She is unable to bear weight through the Right Lower Extremity at this time and is having to use axillary crutches with swing through gait for mobility.   Cher Is progressing well towards her goals.   Pt prognosis is Good.     Pt will continue to benefit from skilled outpatient physical therapy to address the deficits listed in the problem list box on initial evaluation, " provide pt/family education and to maximize pt's level of independence in the home and community environment.     Pt's spiritual, cultural and educational needs considered and pt agreeable to plan of care and goals.     Anticipated barriers to physical therapy: none     Goals: Short Term   Goals: 4 weeks   1. Independent with Home Exercise Program   2. Increase Right Knee Range of Motion to 0 Degrees to 120 Degrees  3. Increase Right Knee Strength to grossly 4/5  4. Patient will ambulate 500 feet with one crutch and with complaints of pain Less than or Equal to 4/10.       Long Term Goals: 8 weeks   1. Patient will increase Right Knee Strength to grossly 5/5  2. Patient will ambulate 1000+ feet without an assistive device and with complaints of pain Less than or Equal to 3/10.   3. Refer patient back to MD at end of Therapy for further assessment and possible MRI if pain and dysfunction persist.        Plan      Plan of care Certification: 7/11/2023 to 9/8/2023.     Outpatient Physical Therapy 2 times weekly for 8 weeks to include the following interventions: Electrical Stimulation to increase strength and decrease complaints of pain and inflammation as able, Game Ready/Vasopneumatic, Manual Therapy, Moist Heat/ Ice, Neuromuscular Re-ed, Patient Education, Therapeutic Activities, and Therapeutic Exercise.   Continue to progress toward goals     Seng Bright, PTA    7/25/2023

## 2023-07-27 ENCOUNTER — CLINICAL SUPPORT (OUTPATIENT)
Dept: REHABILITATION | Facility: HOSPITAL | Age: 18
End: 2023-07-27
Payer: MEDICAID

## 2023-07-27 DIAGNOSIS — M23.91 ACUTE INTERNAL DERANGEMENT OF RIGHT KNEE: Primary | ICD-10-CM

## 2023-07-27 DIAGNOSIS — M25.661 DECREASED RANGE OF MOTION (ROM) OF RIGHT KNEE: ICD-10-CM

## 2023-07-27 DIAGNOSIS — R29.898 WEAKNESS OF RIGHT LEG: ICD-10-CM

## 2023-07-27 DIAGNOSIS — M25.561 ACUTE PAIN OF RIGHT KNEE: ICD-10-CM

## 2023-07-27 PROCEDURE — 97116 GAIT TRAINING THERAPY: CPT | Mod: CQ

## 2023-07-27 PROCEDURE — 97112 NEUROMUSCULAR REEDUCATION: CPT | Mod: CQ

## 2023-07-27 NOTE — PROGRESS NOTES
[facility]   Physical Therapy Treatment Note      Name: Cher Ramos  Clinic Number: [id number]    Therapy Diagnosis:   Encounter Diagnoses   Name Primary?    Acute internal derangement of right knee Yes    Decreased range of motion (ROM) of right knee     Weakness of right leg     Acute pain of right knee      Physician: Eugenio Aviles FNP    Visit Date: 7/27/2023      Physician Orders: PT Eval and Treat   Medical Diagnosis from Referral: Right Knee Pain  Evaluation Date: 7/11/2023  Authorization Period Expiration: Medicaid Managed   Plan of Care Expiration: 9/8/2023   Progress Note Due: As Needed   Visit # / Visits authorized: 5/ Medicaid Managed    FOTO: 69/100  PTA Visit #: 3/5     Time In: 1145 (10 minutes late for appt)  Time Out: 1225  Total Billable Time: 30 minutes +10 minutes ice pack    Received Plan of Care per Keyla Mora PT     Subjective     Pt reports: pain as noted; presents with significant lack of terminal knee extension with ambulation using one crutch; admits to having been propping knee on pillow to sleep  She was compliant with home exercise program.    Pain: 5/10  Location: right knee      Objective      Extension AROM -8 degrees   Quad lag -20 w/ short arc quad; -17 degrees w/ straight leg raise  Knee Flexion 135 degrees     Treatment     Cher received the treatments listed below:      therapeutic exercises to develop strength, endurance, ROM, flexibility, posture, and core stabilization for 0 minutes including:  nustep x 6 min  Slant board stretch 3 x 30 second  Hamstring stretch in supine with green strap 3 x 20 second hold     neuromuscular re-education activities to improve: Balance, Coordination, Kinesthetic, Sense, Proprioception, and Posture for 0 minutes. The following activities were included:  Quad sets x 6 minutes  with neuromuscular electrical stimulation   Straight leg raise x 6 minutes with neuromuscular electrical stimulation    Neuromuscular electrical stimulation x 15 minutes with:   Quad sets x 5 minutes   Short arc quads x 5 minutes   Straight leg raises x 5 minutes     Patient received the following supervised modalities after being cleared for contradictions: Pre-Mod Electrical Stimulation:  Patient received Pre-Mod Electrical Stimulation for pain control applied to the right knee. Pt received stimulation at a frequency of  Hz for 0 minutes. Patient tolerated treatment well without any adverse effects.  cold pack for 15 minutes to right knee in conjunction with PREMOD    gait training to improve functional mobility and safety for 10 minutes, including:  Using axillary crutch on left side with emphasis on advancing RLE and crutch on left side together. Cues and demo for terminal knee extension with heel strike    direct contact modalities after being cleared for contraindications:     hot pack for - minutes to -.        Patient Education and Home Exercises       Education provided:   - avoid resting knee on pillows/in flexion; focus on terminal knee extension with gait; continue current home exercise program     Written Home Exercises Provided: yes. Exercises were reviewed and Cher was able to demonstrate them prior to the end of the session.  Cher demonstrated good  understanding of the education provided. See EMR under Patient Instructions for exercises provided during therapy sessions    Assessment     PMH from evaluation:  Cher is a 17 y.o. female referred to outpatient Physical Therapy with a medical diagnosis of Internal derangement of Right Knee causing Right knee pain. Cher reports: She was playing kickball one year ago and fell in a hole. Her knee popped at that time and was painful but she never saw a doctor. It has given her problems off and on since that time, but she has been able to walk with no difficulty. However, on Friday, 7/7/2023, she was standing at work and pivoted on her Right knee and  "felt it "pop." She had severe pain and swelling at that time and was unable to bear weight on that leg at all.  Most of her pain is medial. At this time, she can bend the knee without too much pain but she is unable to straighten it. She is on crutches today and states she is unable to put much pressure through that Right knee when she tries to walk. She has pain that is usually around 7/10. She has seen Dr Eugenio Aviles in Dr Lozano's office. No MRI has been done but she was referred to Outpatient Physical Therapy.   Patient presents with decreased Range of Motion in the Right knee, especially in regards to Extension. She is unable to get the Right Knee straight at all and lacks 20 degrees of extension even with assistance of the Therapist. Patient was positive for possible medial meniscus tear during the special tests during the Evaluation. Unable to determine if the patella is stable or not at this time as she is very guarded and painful at this time. She is tender to palpation along the medial joint line and the pes anserine. Medial, Superior, and Inferior border of the patella is also very tender to palpation. Feel patient would benefit from MRI or other testing to help determine the cause of her pain and dysfunction. She is unable to bear weight through the Right Lower Extremity at this time and is having to use axillary crutches with swing through gait for mobility.     Estim appropriate for quad recruitment with exercises as noted above; slowly improving extension range of motion; still lacks significant quad strength  Cher Is progressing fairly towards her goals.   Pt prognosis is fair-Good.     Pt will continue to benefit from skilled outpatient physical therapy to address the deficits listed in the problem list box on initial evaluation, provide pt/family education and to maximize pt's level of independence in the home and community environment.     Anticipated barriers to physical therapy: home " exercise program compliance    Goals: Short Term   Goals: 4 weeks   1. Independent with Home Exercise Program   2. Increase Right Knee Range of Motion to 0 Degrees to 120 Degrees  3. Increase Right Knee Strength to grossly 4/5  4. Patient will ambulate 500 feet with one crutch and with complaints of pain Less than or Equal to 4/10.       Long Term Goals: 8 weeks   1. Patient will increase Right Knee Strength to grossly 5/5  2. Patient will ambulate 1000+ feet without an assistive device and with complaints of pain Less than or Equal to 3/10.   3. Refer patient back to MD at end of Therapy for further assessment and possible MRI if pain and dysfunction persist.        Plan      Plan of care Certification: 7/11/2023 to 9/8/2023.     Outpatient Physical Therapy 2 times weekly for 8 weeks to include the following interventions: Electrical Stimulation to increase strength and decrease complaints of pain and inflammation as able, Game Ready/Vasopneumatic, Manual Therapy, Moist Heat/ Ice, Neuromuscular Re-ed, Patient Education, Therapeutic Activities, and Therapeutic Exercise.   Continue to progress toward goals     Jacqueline Ayala, PTA    7/27/2023

## 2023-08-03 ENCOUNTER — CLINICAL SUPPORT (OUTPATIENT)
Dept: REHABILITATION | Facility: HOSPITAL | Age: 18
End: 2023-08-03
Payer: MEDICAID

## 2023-08-03 DIAGNOSIS — M25.561 ACUTE PAIN OF RIGHT KNEE: ICD-10-CM

## 2023-08-03 DIAGNOSIS — M23.91 ACUTE INTERNAL DERANGEMENT OF RIGHT KNEE: Primary | ICD-10-CM

## 2023-08-03 DIAGNOSIS — R29.898 WEAKNESS OF RIGHT LEG: ICD-10-CM

## 2023-08-03 DIAGNOSIS — M25.661 DECREASED RANGE OF MOTION (ROM) OF RIGHT KNEE: ICD-10-CM

## 2023-08-03 PROCEDURE — 97112 NEUROMUSCULAR REEDUCATION: CPT | Mod: CQ

## 2023-08-03 PROCEDURE — 97014 ELECTRIC STIMULATION THERAPY: CPT | Mod: CQ

## 2023-08-03 NOTE — PROGRESS NOTES
[facility]   Physical Therapy Treatment Note      Name: Cher Ramos  Clinic Number: [id number]    Therapy Diagnosis:   Encounter Diagnoses   Name Primary?    Acute internal derangement of right knee Yes    Decreased range of motion (ROM) of right knee     Weakness of right leg     Acute pain of right knee      Physician: Eugenio Aviles FNP    Visit Date: 8/3/2023      Physician Orders: PT Eval and Treat   Medical Diagnosis from Referral: Right Knee Pain  Evaluation Date: 7/11/2023  Authorization Period Expiration: Medicaid Managed   Plan of Care Expiration: 9/8/2023   Progress Note Due: As Needed   Visit # / Visits authorized: 6 / Medicaid Managed    FOTO: 69/100  PTA Visit #: 4/5     Time In: 11:35 am  Time Out: 12:13 pm  Total Billable Time: 38 minutes      Subjective     Pt reports: able to not use her crutches  She was compliant with home exercise program.    Pain: 5/10  Location: right knee      Objective      Extension AROM -8 degrees   Quad lag -20 w/ short arc quad; -17 degrees w/ straight leg raise  Knee Flexion 135 degrees     Treatment     Cher received the treatments listed below:      therapeutic exercises to develop strength, endurance, ROM, flexibility, posture, and core stabilization for 0 minutes including:  nustep x 6 min  Slant board stretch 3 x 30 second  Hamstring stretch in supine with green strap 3 x 20 second hold     neuromuscular re-education activities to improve: Balance, Coordination, Kinesthetic, Sense, Proprioception, and Posture for 23 minutes. The following activities were included:  Prone TERMINAL KNEE EXTENSION 15x10 sec hold  SB with QS 10x10 sec hold  Double leg press, rock into TERMINAL KNEE EXTENSION 7 plates 30x  Single leg press, rock into TERMINAL KNEE EXTENSION 4 plates 20x  Calf Raises off step to promote knee extension 30x        Nauruan e-stim x 12 minutes, 10 sec on/20 sec off ; 2 sec ramp time; 4 mins QS with heel prop,  "SAQ, and SLR      gait training to improve functional mobility and safety for 0 minutes, including:  Using axillary crutch on left side with emphasis on advancing RLE and crutch on left side together. Cues and demo for terminal knee extension with heel strike    direct contact modalities after being cleared for contraindications:     hot pack for - minutes to -.        Patient Education and Home Exercises       Education provided:   - avoid resting knee on pillows/in flexion; focus on terminal knee extension with gait; continue current home exercise program     Written Home Exercises Provided: yes. Exercises were reviewed and Cher was able to demonstrate them prior to the end of the session.  Cher demonstrated good  understanding of the education provided. See EMR under Patient Instructions for exercises provided during therapy sessions    Assessment     Pt has moderate quad lag with SLR and loss of active knee extension. Focused more on this today and on control into TERMINAL KNEE EXTENSION. Some swelling in knee but not a lot. Pt is doing better just needs to continue with HEP and will continue to progress in PT.       PMH from evaluation:  Cher is a 17 y.o. female referred to outpatient Physical Therapy with a medical diagnosis of Internal derangement of Right Knee causing Right knee pain. Cher reports: She was playing kickball one year ago and fell in a hole. Her knee popped at that time and was painful but she never saw a doctor. It has given her problems off and on since that time, but she has been able to walk with no difficulty. However, on Friday, 7/7/2023, she was standing at work and pivoted on her Right knee and felt it "pop." She had severe pain and swelling at that time and was unable to bear weight on that leg at all.  Most of her pain is medial. At this time, she can bend the knee without too much pain but she is unable to straighten it. She is on crutches today and states she is " unable to put much pressure through that Right knee when she tries to walk. She has pain that is usually around 7/10. She has seen Dr Eugenio Aviles in Dr Lozano's office. No MRI has been done but she was referred to Outpatient Physical Therapy.   Patient presents with decreased Range of Motion in the Right knee, especially in regards to Extension. She is unable to get the Right Knee straight at all and lacks 20 degrees of extension even with assistance of the Therapist. Patient was positive for possible medial meniscus tear during the special tests during the Evaluation. Unable to determine if the patella is stable or not at this time as she is very guarded and painful at this time. She is tender to palpation along the medial joint line and the pes anserine. Medial, Superior, and Inferior border of the patella is also very tender to palpation. Feel patient would benefit from MRI or other testing to help determine the cause of her pain and dysfunction. She is unable to bear weight through the Right Lower Extremity at this time and is having to use axillary crutches with swing through gait for mobility.       Cher Is progressing fairly towards her goals.   Pt prognosis is fair-Good.     Pt will continue to benefit from skilled outpatient physical therapy to address the deficits listed in the problem list box on initial evaluation, provide pt/family education and to maximize pt's level of independence in the home and community environment.     Anticipated barriers to physical therapy: home exercise program compliance    Goals: Short Term   Goals: 4 weeks   1. Independent with Home Exercise Program   2. Increase Right Knee Range of Motion to 0 Degrees to 120 Degrees  3. Increase Right Knee Strength to grossly 4/5  4. Patient will ambulate 500 feet with one crutch and with complaints of pain Less than or Equal to 4/10.       Long Term Goals: 8 weeks   1. Patient will increase Right Knee Strength to grossly  5/5  2. Patient will ambulate 1000+ feet without an assistive device and with complaints of pain Less than or Equal to 3/10.   3. Refer patient back to MD at end of Therapy for further assessment and possible MRI if pain and dysfunction persist.        Plan      Plan of care Certification: 7/11/2023 to 9/8/2023.     Outpatient Physical Therapy 2 times weekly for 8 weeks to include the following interventions: Electrical Stimulation to increase strength and decrease complaints of pain and inflammation as able, Game Ready/Vasopneumatic, Manual Therapy, Moist Heat/ Ice, Neuromuscular Re-ed, Patient Education, Therapeutic Activities, and Therapeutic Exercise.   Continue to progress toward goals     Bob Rodarte, PTA    8/3/2023

## 2023-08-08 ENCOUNTER — CLINICAL SUPPORT (OUTPATIENT)
Dept: REHABILITATION | Facility: HOSPITAL | Age: 18
End: 2023-08-08
Payer: MEDICAID

## 2023-08-08 DIAGNOSIS — M25.561 ACUTE PAIN OF RIGHT KNEE: ICD-10-CM

## 2023-08-08 DIAGNOSIS — R29.898 WEAKNESS OF RIGHT LEG: ICD-10-CM

## 2023-08-08 DIAGNOSIS — M23.91 ACUTE INTERNAL DERANGEMENT OF RIGHT KNEE: Primary | ICD-10-CM

## 2023-08-08 DIAGNOSIS — M25.661 DECREASED RANGE OF MOTION (ROM) OF RIGHT KNEE: ICD-10-CM

## 2023-08-08 PROCEDURE — 97014 ELECTRIC STIMULATION THERAPY: CPT

## 2023-08-08 PROCEDURE — 97112 NEUROMUSCULAR REEDUCATION: CPT

## 2023-08-08 PROCEDURE — 97110 THERAPEUTIC EXERCISES: CPT

## 2023-08-08 NOTE — PROGRESS NOTES
"[facility]   Physical Therapy Treatment Note      Name: Cher Ramos  Clinic Number: [id number]    Therapy Diagnosis:   Encounter Diagnoses   Name Primary?    Acute internal derangement of right knee Yes    Decreased range of motion (ROM) of right knee     Weakness of right leg     Acute pain of right knee      Physician: Eugenio Aviles FNP    Visit Date: 8/8/2023      Physician Orders: PT Eval and Treat   Medical Diagnosis from Referral: Right Knee Pain  Evaluation Date: 7/11/2023  Authorization Period Expiration: Medicaid Managed   Plan of Care Expiration: 9/8/2023   Progress Note Due: As Needed   Visit # / Visits authorized: 7 / Medicaid Managed    FOTO: 69/100  PTA Visit #: 4/5     Time In: 1:00 pm  Time Out: 2:00 pm  Total Billable Time: 60 minutes      Subjective     Pt reports: knee is less painful but she is still having trouble walking "right"  She was compliant with home exercise program.    Pain: 1/10  Location: right knee      Objective      Extension AROM -5 degrees   Quad lag -10 w/ short arc quad; -10 degrees w/ straight leg raise  Knee Flexion 135 degrees     Treatment     Cher received the treatments listed below:      therapeutic exercises to develop strength, endurance, ROM, flexibility, posture, and core stabilization for 15 minutes including:  nustep x 6 min  Slant board stretch 3 x 30 second  Hamstring stretch in supine with green strap 3 x 20 second hold     neuromuscular re-education activities to improve: Balance, Coordination, Kinesthetic, Sense, Proprioception, and Posture for 30 minutes. The following activities were included:  Prone TERMINAL KNEE EXTENSION 15x10 sec hold  SB with QS 10x10 sec hold  Double leg press, rock into TERMINAL KNEE EXTENSION 7 plates 30x  Single leg press, rock into TERMINAL KNEE EXTENSION 4 plates 20x  Cybex Hamstring Curls 3 x 10 with 4 plates   Calf Raises off step to promote knee extension 30x      Russian e-stim " x 15 minutes, 10 sec on/20 sec off ; 2 sec ramp time; 5 mins QS with heel prop, SAQ, and SLR      gait training to improve functional mobility and safety for 0 minutes, including:  Using axillary crutch on left side with emphasis on advancing RLE and crutch on left side together. Cues and demo for terminal knee extension with heel strike    direct contact modalities after being cleared for contraindications:   Patient received the following supervised modalities after being cleared for contradictions: Pre-Mod Electrical Stimulation:  Patient received Pre-Mod Electrical Stimulation for pain control applied to the Right Knee. Pt received stimulation at a frequency of  Hz for 15 minutes. Patient tolerated treatment well without any adverse effects.       Cold pack for - 15 minutes to Right Knee.        Patient Education and Home Exercises       Education provided:   - avoid resting knee on pillows/in flexion; focus on terminal knee extension with gait; continue current home exercise program     Written Home Exercises Provided: yes. Exercises were reviewed and Cher was able to demonstrate them prior to the end of the session.  Cher demonstrated good  understanding of the education provided. See EMR under Patient Instructions for exercises provided during therapy sessions    Assessment     Patient continues to have dysfunction in the Right Knee. She is unable to fully extend the Right Knee. Therapist is working on strengthening and Range of Motion. She continues to have an extension lag with SAQs and SLRs. Therapist is using E Stim via [employer] for neuro re-education of the Quads. Some edema remains in the Right Knee. Ended session with Pre-Mod and Cold Pack to help decrease pain, edema, and inflammation as able. Sup Visit performed today with CORTNEY Med[place]in and CORTNEY Phillip.  All goals and treatment plan reviewed. Will work toward completion of all goals set.           PMH from  "evaluation:  Cher is a 17 y.o. female referred to outpatient Physical Therapy with a medical diagnosis of Internal derangement of Right Knee causing Right knee pain. Cher reports: She was playing kickball one year ago and fell in a hole. Her knee popped at that time and was painful but she never saw a doctor. It has given her problems off and on since that time, but she has been able to walk with no difficulty. However, on Friday, 7/7/2023, she was standing at work and pivoted on her Right knee and felt it "pop." She had severe pain and swelling at that time and was unable to bear weight on that leg at all.  Most of her pain is medial. At this time, she can bend the knee without too much pain but she is unable to straighten it. She is on crutches today and states she is unable to put much pressure through that Right knee when she tries to walk. She has pain that is usually around 7/10. She has seen Dr Eugenio Aviles in Dr Lozano's office. No MRI has been done but she was referred to Outpatient Physical Therapy.   Patient presents with decreased Range of Motion in the Right knee, especially in regards to Extension. She is unable to get the Right Knee straight at all and lacks 20 degrees of extension even with assistance of the Therapist. Patient was positive for possible medial meniscus tear during the special tests during the Evaluation. Unable to determine if the patella is stable or not at this time as she is very guarded and painful at this time. She is tender to palpation along the medial joint line and the pes anserine. Medial, Superior, and Inferior border of the patella is also very tender to palpation. Feel patient would benefit from MRI or other testing to help determine the cause of her pain and dysfunction. She is unable to bear weight through the Right Lower Extremity at this time and is having to use axillary crutches with swing through gait for mobility.       Cher Is progressing fairly " towards her goals.   Pt prognosis is fair-Good.     Pt will continue to benefit from skilled outpatient physical therapy to address the deficits listed in the problem list box on initial evaluation, provide pt/family education and to maximize pt's level of independence in the home and community environment.     Anticipated barriers to physical therapy: home exercise program compliance    Goals: Short Term   Goals: 4 weeks   1. Independent with Home Exercise Program   2. Increase Right Knee Range of Motion to 0 Degrees to 120 Degrees  3. Increase Right Knee Strength to grossly 4/5  4. Patient will ambulate 500 feet with one crutch and with complaints of pain Less than or Equal to 4/10.       Long Term Goals: 8 weeks   1. Patient will increase Right Knee Strength to grossly 5/5  2. Patient will ambulate 1000+ feet without an assistive device and with complaints of pain Less than or Equal to 3/10.   3. Refer patient back to MD at end of Therapy for further assessment and possible MRI if pain and dysfunction persist.        Plan      Plan of care Certification: 7/11/2023 to 9/8/2023.     Outpatient Physical Therapy 2 times weekly for 8 weeks to include the following interventions: Electrical Stimulation to increase strength and decrease complaints of pain and inflammation as able, Game Ready/Vasopneumatic, Manual Therapy, Moist Heat/ Ice, Neuromuscular Re-ed, Patient Education, Therapeutic Activities, and Therapeutic Exercise.   Continue to progress toward goals     ERIKA ROME, PT, DPT   8/8/2023

## 2023-08-10 ENCOUNTER — CLINICAL SUPPORT (OUTPATIENT)
Dept: REHABILITATION | Facility: HOSPITAL | Age: 18
End: 2023-08-10
Payer: MEDICAID

## 2023-08-10 DIAGNOSIS — R29.898 WEAKNESS OF RIGHT LEG: ICD-10-CM

## 2023-08-10 DIAGNOSIS — M25.561 ACUTE PAIN OF RIGHT KNEE: ICD-10-CM

## 2023-08-10 DIAGNOSIS — M25.661 DECREASED RANGE OF MOTION (ROM) OF RIGHT KNEE: ICD-10-CM

## 2023-08-10 DIAGNOSIS — M23.91 ACUTE INTERNAL DERANGEMENT OF RIGHT KNEE: Primary | ICD-10-CM

## 2023-08-10 PROCEDURE — 97112 NEUROMUSCULAR REEDUCATION: CPT | Mod: CQ

## 2023-08-10 PROCEDURE — 97110 THERAPEUTIC EXERCISES: CPT | Mod: CQ

## 2023-08-10 NOTE — PROGRESS NOTES
[facility]   Physical Therapy Treatment Note      Name: Cher Ramos  Clinic Number: [id number]    Therapy Diagnosis:   Encounter Diagnoses   Name Primary?    Acute internal derangement of right knee Yes    Decreased range of motion (ROM) of right knee     Weakness of right leg     Acute pain of right knee      Physician: Eugenio Aviles FNP    Visit Date: 8/10/2023    Physician Orders: PT Eval and Treat   Medical Diagnosis from Referral: Right Knee Pain  Evaluation Date: 7/11/2023  Authorization Period Expiration: Medicaid Managed   Plan of Care Expiration: 9/8/2023   Progress Note Due: As Needed   Visit # / Visits authorized: 8 / Medicaid Managed    FOTO: 69/100  PTA Visit #: 1/5     Time In: 11:40 am (Patient arrived 10 minutes late to appt today)  Time Out: 12:10 pm  Total Billable Time: 30 minutes    Subjective     Pt reports: pain is not as bad but she still can't get her knee all the way straight.  She was compliant with home exercise program.    Pain: 1/10  Location: right knee      Objective      Extension AROM -7 degrees   Quad lag -20 w/ short arc quad; -15 degrees w/ straight leg raise  Knee Flexion 135 degrees     Treatment     Cher received the treatments listed below:      therapeutic exercises to develop strength, endurance, ROM, flexibility, posture, and core stabilization for 8 minutes including:    Bike 3 minutes   Slant board stretch 3 x 30 second  Hamstring stretch on step 2 x 20 second hold   Knee flexion stretch on step 2 x 20 second hold     neuromuscular re-education activities to improve: Balance, Coordination, Kinesthetic, Sense, Proprioception, and Posture for 20 minutes. The following activities were included:    Prone TERMINAL KNEE EXTENSION 15x10 sec hold  SB with QS 10x10 sec hold  Calf Raises off step to promote knee extension 30x    Chadian e-stim x 12 minutes, 10 sec on/20 sec off ; 2 sec ramp time; 4 mins QS with heel prop, SAQ, and  SLR    gait training to improve functional mobility and safety for 0 minutes, including:  Using axillary crutch on left side with emphasis on advancing RLE and crutch on left side together. Cues and demo for terminal knee extension with heel strike    direct contact modalities after being cleared for contraindications:     hot pack for - minutes to -.    Patient Education and Home Exercises       Education provided:   - avoid resting knee on pillows/in flexion; focus on terminal knee extension with gait; continue current home exercise program     Written Home Exercises Provided: yes. Exercises were reviewed and Cher was able to demonstrate them prior to the end of the session.  Cher demonstrated good  understanding of the education provided. See EMR under Patient Instructions for exercises provided during therapy sessions    Assessment     Patient arrived with decreased pain reports of the left knee but still is lacking full extension actively. She has moderate quad lag with SLR and loss of active knee extension with ambulation. Patient tends to land foot flat rather than heel strike at initial contact.  Focused on quad control today into TERMINAL KNEE EXTENSION with use of [place] neuromuscular electrical stimulation.  Pt is doing better just needs to continue with home exercise program. She will have completed 6 weeks of formal physical therapy after next week and at that time, we will refer her back to Ortho MD.       [facility] from evaluation:  Cher is a 17 y.o. female referred to outpatient Physical Therapy with a medical diagnosis of Internal derangement of Right Knee causing Right knee pain. Cher reports: She was playing kickball one year ago and fell in a hole. Her knee popped at that time and was painful but she never saw a doctor. It has given her problems off and on since that time, but she has been able to walk with no difficulty. However, on Friday, 7/7/2023, she was standing at work and  "pivoted on her Right knee and felt it "pop." She had severe pain and swelling at that time and was unable to bear weight on that leg at all.  Most of her pain is medial. At this time, she can bend the knee without too much pain but she is unable to straighten it. She is on crutches today and states she is unable to put much pressure through that Right knee when she tries to walk. She has pain that is usually around 7/10. She has seen Dr Eugenio Aviles in Dr Lozano's office. No MRI has been done but she was referred to Outpatient Physical Therapy.   Patient presents with decreased Range of Motion in the Right knee, especially in regards to Extension. She is unable to get the Right Knee straight at all and lacks 20 degrees of extension even with assistance of the Therapist. Patient was positive for possible medial meniscus tear during the special tests during the Evaluation. Unable to determine if the patella is stable or not at this time as she is very guarded and painful at this time. She is tender to palpation along the medial joint line and the pes anserine. Medial, Superior, and Inferior border of the patella is also very tender to palpation. Feel patient would benefit from MRI or other testing to help determine the cause of her pain and dysfunction. She is unable to bear weight through the Right Lower Extremity at this time and is having to use axillary crutches with swing through gait for mobility.       Cher Is progressing fairly towards her goals.   Pt prognosis is fair-Good.     Pt will continue to benefit from skilled outpatient physical therapy to address the deficits listed in the problem list box on initial evaluation, provide pt/family education and to maximize pt's level of independence in the home and community environment.     Anticipated barriers to physical therapy: home exercise program compliance    Goals: Short Term   Goals: 4 weeks   1. Independent with Home Exercise Program   2. " Increase Right Knee Range of Motion to 0 Degrees to 120 Degrees  3. Increase Right Knee Strength to grossly 4/5  4. Patient will ambulate 500 feet with one crutch and with complaints of pain Less than or Equal to 4/10.       Long Term Goals: 8 weeks   1. Patient will increase Right Knee Strength to grossly 5/5  2. Patient will ambulate 1000+ feet without an assistive device and with complaints of pain Less than or Equal to 3/10.   3. Refer patient back to MD at end of Therapy for further assessment and possible MRI if pain and dysfunction persist.        Plan      Plan of care Certification: 7/11/2023 to 9/8/2023.     Outpatient Physical Therapy 2 times weekly for 8 weeks to include the following interventions: Electrical Stimulation to increase strength and decrease complaints of pain and inflammation as able, Game Ready/Vasopneumatic, Manual Therapy, Moist Heat/ Ice, Neuromuscular Re-ed, Patient Education, Therapeutic Activities, and Therapeutic Exercise.   Continue to progress toward goals     Seng Bright, MENDOZA    8/10/2023

## 2023-08-15 ENCOUNTER — CLINICAL SUPPORT (OUTPATIENT)
Dept: REHABILITATION | Facility: HOSPITAL | Age: 18
End: 2023-08-15
Payer: MEDICAID

## 2023-08-15 DIAGNOSIS — M25.661 DECREASED RANGE OF MOTION (ROM) OF RIGHT KNEE: ICD-10-CM

## 2023-08-15 DIAGNOSIS — R29.898 WEAKNESS OF RIGHT LEG: ICD-10-CM

## 2023-08-15 DIAGNOSIS — M25.561 ACUTE PAIN OF RIGHT KNEE: ICD-10-CM

## 2023-08-15 DIAGNOSIS — M23.91 ACUTE INTERNAL DERANGEMENT OF RIGHT KNEE: Primary | ICD-10-CM

## 2023-08-15 PROCEDURE — 97110 THERAPEUTIC EXERCISES: CPT

## 2023-08-15 PROCEDURE — 97112 NEUROMUSCULAR REEDUCATION: CPT

## 2023-08-15 PROCEDURE — 97014 ELECTRIC STIMULATION THERAPY: CPT

## 2023-08-15 NOTE — PROGRESS NOTES
[facility]   Physical Therapy Treatment Note      Name: Cher Ramos  Clinic Number: [id number]    Therapy Diagnosis:   No diagnosis found.    Physician: Eugenio Aviles FNP    Visit Date: 8/15/2023    Physician Orders: PT Eval and Treat   Medical Diagnosis from Referral: Right Knee Pain  Evaluation Date: 7/11/2023  Authorization Period Expiration: Medicaid Managed   Plan of Care Expiration: 9/8/2023   Progress Note Due: As Needed   Visit # / Visits authorized: 9 / Medicaid Managed    FOTO: 69/100  PTA Visit #: 1/5     Time In: 3:03 pm   Time Out: 4:10 pm  Total Billable Time: 55 minutes    Subjective     Pt reports: she is still unable to straighten the Right Knee. She reports her pain at rest is 0/10 and with activity it increases to 3/10.   She was compliant with home exercise program.    Pain: 2-3/10  Location: right knee      Objective      Extension AROM -5 degrees   Quad lag -20 w/ short arc quad; -15 degrees w/ straight leg raise  Knee Flexion 140 degrees     Treatment     Cher received the treatments listed below:      therapeutic exercises to develop strength, endurance, ROM, flexibility, posture, and core stabilization for 15 minutes including:    Bike 5 minutes   Slant board stretch 3 x 30 second  Hamstring stretch on step 2 x 20 second hold   Knee flexion stretch on step 2 x 20 second hold   Knee flexion ball rolls with red peanut x 20    neuromuscular re-education activities to improve: Balance, Coordination, Kinesthetic, Sense, Proprioception, and Posture for 25 minutes. The following activities were included:    Prone TERMINAL KNEE EXTENSION 15x10 sec hold  SB with QS 10x10 sec hold  Calf Raises off step to promote knee extension 30x    Nepalese e-stim x 15 minutes, 10 sec on/20 sec off ; 2 sec ramp time; 5 mins QS with heel prop, SAQ, and SLR    gait training to improve functional mobility and safety for 0 minutes, including:  Using axillary crutch on  left side with emphasis on advancing RLE and crutch on left side together. Cues and demo for terminal knee extension with heel strike    direct contact modalities after being cleared for contraindications:   Patient received the following supervised modalities after being cleared for contradictions: Pre-Mod Electrical Stimulation:  Patient received Pre-Mod Electrical Stimulation for pain control applied to the Right Knee. Pt received stimulation at a frequency of  Hz for 15 minutes. Patient tolerated treatment well without any adverse effects.       Cold pack for -  15 minutes to Right Knee.    Patient Education and Home Exercises       Education provided:   - avoid resting knee on pillows/in flexion; focus on terminal knee extension with gait; continue current home exercise program     Written Home Exercises Provided: yes. Exercises were reviewed and Cher was able to demonstrate them prior to the end of the session.  Cher demonstrated good  understanding of the education provided. See EMR under Patient Instructions for exercises provided during therapy sessions    Assessment     Patient has received Physical Therapy for 6 weeks. We have been able to make good progress with patient on strength  and Range of Motion but she continues to be unable to fully straighten the Right knee. Her Right Knee Extension Range of Motion is now -5 degrees. She also has an extension lag of -15 degrees with straight leg raises. She is unable to fully extend with ambulation as well causing decreased stance time and antalgic gait on the Right. Therapist has worked with patient on increasing quad strength especially with terminal knee extension. Using Russian Stim along with Quad sets, Short Arc Quads, and Straight Leg Raises in order to help increase quality of her quad contraction. She has responded well but remains with pain and dysfunction in that knee. She does go see her Ortho MD, Dr Geovany Vega, on Thursday, 8/17/2023,  "to discuss further options for the Right Knee. We will await any new orders from Ortho MD.           [facility] from evaluation:  Cher is a 17 y.o. female referred to outpatient Physical Therapy with a medical diagnosis of Internal derangement of Right Knee causing Right knee pain. Cher reports: She was playing kickball one year ago and fell in a hole. Her knee popped at that time and was painful but she never saw a doctor. It has given her problems off and on since that time, but she has been able to walk with no difficulty. However, on Friday, 7/7/2023, she was standing at work and pivoted on her Right knee and felt it "pop." She had severe pain and swelling at that time and was unable to bear weight on that leg at all.  Most of her pain is medial. At this time, she can bend the knee without too much pain but she is unable to straighten it. She is on crutches today and states she is unable to put much pressure through that Right knee when she tries to walk. She has pain that is usually around 7/10. She has seen Dr Eugenio Aviles in Dr Lozano's office. No MRI has been done but she was referred to Outpatient Physical Therapy.   Patient presents with decreased Range of Motion in the Right knee, especially in regards to Extension. She is unable to get the Right Knee straight at all and lacks 20 degrees of extension even with assistance of the Therapist. Patient was positive for possible medial meniscus tear during the special tests during the Evaluation. Unable to determine if the patella is stable or not at this time as she is very guarded and painful at this time. She is tender to palpation along the medial joint line and the pes anserine. Medial, Superior, and Inferior border of the patella is also very tender to palpation. Feel patient would benefit from MRI or other testing to help determine the cause of her pain and dysfunction. She is unable to bear weight through the Right Lower Extremity at this time and " is having to use axillary crutches with swing through gait for mobility.       Cher Is progressing fairly towards her goals.   Pt prognosis is fair-Good.     Pt will continue to benefit from skilled outpatient physical therapy to address the deficits listed in the problem list box on initial evaluation, provide pt/family education and to maximize pt's level of independence in the home and community environment.     Anticipated barriers to physical therapy: home exercise program compliance    Goals: Short Term   Goals: 4 weeks   1. Independent with Home Exercise Program   2. Increase Right Knee Range of Motion to 0 Degrees to 120 Degrees  3. Increase Right Knee Strength to grossly 4/5  4. Patient will ambulate 500 feet with one crutch and with complaints of pain Less than or Equal to 4/10.       Long Term Goals: 8 weeks   1. Patient will increase Right Knee Strength to grossly 5/5  2. Patient will ambulate 1000+ feet without an assistive device and with complaints of pain Less than or Equal to 3/10.   3. Refer patient back to MD at end of Therapy for further assessment and possible MRI if pain and dysfunction persist.        Plan      Plan of care Certification: 7/11/2023 to 9/8/2023.     Outpatient Physical Therapy 2 times weekly for 8 weeks to include the following interventions: Electrical Stimulation to increase strength and decrease complaints of pain and inflammation as able, Game Ready/Vasopneumatic, Manual Therapy, Moist Heat/ Ice, Neuromuscular Re-ed, Patient Education, Therapeutic Activities, and Therapeutic Exercise.   Continue to progress toward goals     ERIKA ROME, PT, DPT   8/15/2023

## 2023-08-16 ENCOUNTER — OFFICE VISIT (OUTPATIENT)
Dept: FAMILY MEDICINE | Facility: CLINIC | Age: 18
End: 2023-08-16
Payer: MEDICAID

## 2023-08-16 VITALS
TEMPERATURE: 98 F | SYSTOLIC BLOOD PRESSURE: 109 MMHG | RESPIRATION RATE: 20 BRPM | OXYGEN SATURATION: 98 % | HEART RATE: 83 BPM | WEIGHT: 179 LBS | HEIGHT: 64 IN | DIASTOLIC BLOOD PRESSURE: 72 MMHG | BODY MASS INDEX: 30.56 KG/M2

## 2023-08-16 DIAGNOSIS — E66.9 CLASS 1 OBESITY WITH BODY MASS INDEX (BMI) OF 30.0 TO 30.9 IN ADULT, UNSPECIFIED OBESITY TYPE, UNSPECIFIED WHETHER SERIOUS COMORBIDITY PRESENT: ICD-10-CM

## 2023-08-16 DIAGNOSIS — Z00.00 WELL ADULT HEALTH CHECK: Primary | ICD-10-CM

## 2023-08-16 DIAGNOSIS — Z72.51 HIGH RISK SEXUAL BEHAVIOR, UNSPECIFIED TYPE: ICD-10-CM

## 2023-08-16 DIAGNOSIS — Z13.220 SCREENING FOR LIPID DISORDERS: ICD-10-CM

## 2023-08-16 DIAGNOSIS — Z13.1 SCREENING FOR DIABETES MELLITUS: ICD-10-CM

## 2023-08-16 LAB
ALBUMIN SERPL BCP-MCNC: 3.6 G/DL (ref 3.5–5)
ALBUMIN/GLOB SERPL: 0.9 {RATIO}
ALP SERPL-CCNC: 105 U/L (ref 52–144)
ALT SERPL W P-5'-P-CCNC: 25 U/L (ref 13–56)
ANION GAP SERPL CALCULATED.3IONS-SCNC: 11 MMOL/L (ref 7–16)
AST SERPL W P-5'-P-CCNC: 15 U/L (ref 15–37)
BASOPHILS # BLD AUTO: 0.03 K/UL (ref 0–0.2)
BASOPHILS NFR BLD AUTO: 0.4 % (ref 0–1)
BILIRUB SERPL-MCNC: 0.3 MG/DL (ref ?–1.2)
BUN SERPL-MCNC: 16 MG/DL (ref 7–18)
BUN/CREAT SERPL: 23 (ref 6–20)
CALCIUM SERPL-MCNC: 9.3 MG/DL (ref 8.5–10.1)
CANDIDA SPECIES: POSITIVE
CHLORIDE SERPL-SCNC: 104 MMOL/L (ref 98–107)
CHOLEST SERPL-MCNC: 162 MG/DL (ref 0–200)
CHOLEST/HDLC SERPL: 2.2 {RATIO}
CO2 SERPL-SCNC: 28 MMOL/L (ref 21–32)
CREAT SERPL-MCNC: 0.7 MG/DL (ref 0.55–1.02)
DIFFERENTIAL METHOD BLD: ABNORMAL
EGFR (NO RACE VARIABLE) (RUSH/TITUS): 128 ML/MIN/1.73M2
EOSINOPHIL # BLD AUTO: 0.33 K/UL (ref 0–0.5)
EOSINOPHIL NFR BLD AUTO: 4.8 % (ref 1–4)
ERYTHROCYTE [DISTWIDTH] IN BLOOD BY AUTOMATED COUNT: 14.2 % (ref 11.5–14.5)
GARDNERELLA: POSITIVE
GLOBULIN SER-MCNC: 3.9 G/DL (ref 2–4)
GLUCOSE SERPL-MCNC: 75 MG/DL (ref 74–106)
HCT VFR BLD AUTO: 39.1 % (ref 38–47)
HDLC SERPL-MCNC: 74 MG/DL (ref 40–60)
HGB BLD-MCNC: 12.9 G/DL (ref 12–16)
IMM GRANULOCYTES # BLD AUTO: 0.02 K/UL (ref 0–0.04)
IMM GRANULOCYTES NFR BLD: 0.3 % (ref 0–0.4)
LDLC SERPL CALC-MCNC: 80 MG/DL
LDLC/HDLC SERPL: 1.1 {RATIO}
LYMPHOCYTES # BLD AUTO: 0.97 K/UL (ref 1–4.8)
LYMPHOCYTES NFR BLD AUTO: 14 % (ref 27–41)
MCH RBC QN AUTO: 28.6 PG (ref 27–31)
MCHC RBC AUTO-ENTMCNC: 33 G/DL (ref 32–36)
MCV RBC AUTO: 86.7 FL (ref 80–96)
MONOCYTES # BLD AUTO: 0.5 K/UL (ref 0–0.8)
MONOCYTES NFR BLD AUTO: 7.2 % (ref 2–6)
MPC BLD CALC-MCNC: 12.6 FL (ref 9.4–12.4)
NEUTROPHILS # BLD AUTO: 5.07 K/UL (ref 1.8–7.7)
NEUTROPHILS NFR BLD AUTO: 73.3 % (ref 53–65)
NONHDLC SERPL-MCNC: 88 MG/DL
NRBC # BLD AUTO: 0 X10E3/UL
NRBC, AUTO (.00): 0 %
PLATELET # BLD AUTO: 243 K/UL (ref 150–400)
POTASSIUM SERPL-SCNC: 4.5 MMOL/L (ref 3.5–5.1)
PROT SERPL-MCNC: 7.5 G/DL (ref 6.4–8.2)
RBC # BLD AUTO: 4.51 M/UL (ref 4.2–5.4)
SODIUM SERPL-SCNC: 138 MMOL/L (ref 136–145)
TRICHOMONAS: NEGATIVE
TRIGL SERPL-MCNC: 41 MG/DL (ref 35–150)
VLDLC SERPL-MCNC: 8 MG/DL
WBC # BLD AUTO: 6.92 K/UL (ref 4.5–11)

## 2023-08-16 PROCEDURE — 87510 GARDNER VAG DNA DIR PROBE: CPT | Mod: ,,, | Performed by: CLINICAL MEDICAL LABORATORY

## 2023-08-16 PROCEDURE — 3008F PR BODY MASS INDEX (BMI) DOCUMENTED: ICD-10-PCS | Mod: CPTII,,, | Performed by: NURSE PRACTITIONER

## 2023-08-16 PROCEDURE — 3074F PR MOST RECENT SYSTOLIC BLOOD PRESSURE < 130 MM HG: ICD-10-PCS | Mod: CPTII,,, | Performed by: NURSE PRACTITIONER

## 2023-08-16 PROCEDURE — 87480 BACTERIAL VAGINOSIS: ICD-10-PCS | Mod: ,,, | Performed by: CLINICAL MEDICAL LABORATORY

## 2023-08-16 PROCEDURE — 87660 BACTERIAL VAGINOSIS: ICD-10-PCS | Mod: ,,, | Performed by: CLINICAL MEDICAL LABORATORY

## 2023-08-16 PROCEDURE — 80061 LIPID PANEL: ICD-10-PCS | Mod: ,,, | Performed by: CLINICAL MEDICAL LABORATORY

## 2023-08-16 PROCEDURE — 1159F PR MEDICATION LIST DOCUMENTED IN MEDICAL RECORD: ICD-10-PCS | Mod: CPTII,,, | Performed by: NURSE PRACTITIONER

## 2023-08-16 PROCEDURE — 87491 CHLMYD TRACH DNA AMP PROBE: CPT | Mod: ,,, | Performed by: CLINICAL MEDICAL LABORATORY

## 2023-08-16 PROCEDURE — 85025 COMPLETE CBC W/AUTO DIFF WBC: CPT | Mod: ,,, | Performed by: CLINICAL MEDICAL LABORATORY

## 2023-08-16 PROCEDURE — 1159F MED LIST DOCD IN RCRD: CPT | Mod: CPTII,,, | Performed by: NURSE PRACTITIONER

## 2023-08-16 PROCEDURE — 80053 COMPREHENSIVE METABOLIC PANEL: ICD-10-PCS | Mod: ,,, | Performed by: CLINICAL MEDICAL LABORATORY

## 2023-08-16 PROCEDURE — 87510 BACTERIAL VAGINOSIS: ICD-10-PCS | Mod: ,,, | Performed by: CLINICAL MEDICAL LABORATORY

## 2023-08-16 PROCEDURE — 1160F PR REVIEW ALL MEDS BY PRESCRIBER/CLIN PHARMACIST DOCUMENTED: ICD-10-PCS | Mod: CPTII,,, | Performed by: NURSE PRACTITIONER

## 2023-08-16 PROCEDURE — 87660 TRICHOMONAS VAGIN DIR PROBE: CPT | Mod: ,,, | Performed by: CLINICAL MEDICAL LABORATORY

## 2023-08-16 PROCEDURE — 3074F SYST BP LT 130 MM HG: CPT | Mod: CPTII,,, | Performed by: NURSE PRACTITIONER

## 2023-08-16 PROCEDURE — 87591 CHLAMYDIA/GONORRHOEAE(GC), PCR: ICD-10-PCS | Mod: ,,, | Performed by: CLINICAL MEDICAL LABORATORY

## 2023-08-16 PROCEDURE — 3008F BODY MASS INDEX DOCD: CPT | Mod: CPTII,,, | Performed by: NURSE PRACTITIONER

## 2023-08-16 PROCEDURE — 99395 PR PREVENTIVE VISIT,EST,18-39: ICD-10-PCS | Mod: EP,,, | Performed by: NURSE PRACTITIONER

## 2023-08-16 PROCEDURE — 87491 CHLAMYDIA/GONORRHOEAE(GC), PCR: ICD-10-PCS | Mod: ,,, | Performed by: CLINICAL MEDICAL LABORATORY

## 2023-08-16 PROCEDURE — 3078F PR MOST RECENT DIASTOLIC BLOOD PRESSURE < 80 MM HG: ICD-10-PCS | Mod: CPTII,,, | Performed by: NURSE PRACTITIONER

## 2023-08-16 PROCEDURE — 87480 CANDIDA DNA DIR PROBE: CPT | Mod: ,,, | Performed by: CLINICAL MEDICAL LABORATORY

## 2023-08-16 PROCEDURE — 80053 COMPREHEN METABOLIC PANEL: CPT | Mod: ,,, | Performed by: CLINICAL MEDICAL LABORATORY

## 2023-08-16 PROCEDURE — 80061 LIPID PANEL: CPT | Mod: ,,, | Performed by: CLINICAL MEDICAL LABORATORY

## 2023-08-16 PROCEDURE — 1160F RVW MEDS BY RX/DR IN RCRD: CPT | Mod: CPTII,,, | Performed by: NURSE PRACTITIONER

## 2023-08-16 PROCEDURE — 99395 PREV VISIT EST AGE 18-39: CPT | Mod: EP,,, | Performed by: NURSE PRACTITIONER

## 2023-08-16 PROCEDURE — 87591 N.GONORRHOEAE DNA AMP PROB: CPT | Mod: ,,, | Performed by: CLINICAL MEDICAL LABORATORY

## 2023-08-16 PROCEDURE — 3078F DIAST BP <80 MM HG: CPT | Mod: CPTII,,, | Performed by: NURSE PRACTITIONER

## 2023-08-16 PROCEDURE — 85025 CBC WITH DIFFERENTIAL: ICD-10-PCS | Mod: ,,, | Performed by: CLINICAL MEDICAL LABORATORY

## 2023-08-16 NOTE — PROGRESS NOTES
[facility]   [facility]      PATIENT NAME: Cher Ramos  : 2005  DATE: 23  MRN: [id number]      Patient PCP Information       Provider PCP Type    Tracie BALDOMERO [facility]          Chief Complaint      Chief Complaint   Patient presents with    wellness     Patient presents to the clinic for wellness ([place])  Rm2       History of Present Illness        Cher Ramos is a 17 y.o. female who presents for routine wellness visit. Pt states she is doing well with no acute complaints. Pt denies  FHx of breast, colon, or cervical cancer. Last colonoscopy was never with 45 year old screen recs. Last mammogram was never, 40 year old screening rec. Last pap was never, recommend at 19 years of age. Flu shot available  in .  Nonsmoker. Working on improving diet and exercise.  School-[employer])  Lives at home mom, brothers  No smoking in home. No pets in home     LMP 2023  Regular normal flow  New partner, reports odor hx of BV  STD screen requested    Past Medical History:  Past Medical History:   Diagnosis Date    Allergy     Eczema        Past Surgical History:   has a past surgical history that includes [place] tooth extraction (Bilateral).    Social History:  Social History     Tobacco Use    Smoking status: Never    Smokeless tobacco: Never   Substance Use Topics    Alcohol use: Never    Drug use: Never       I personally reviewed all past medical, surgical, and social.     Review of Systems   Constitutional:  Negative for activity change, appetite change, chills, diaphoresis, fatigue, fever and unexpected weight change.   HENT:  Negative for congestion, ear pain, facial swelling, hearing loss, nosebleeds and sore throat.    Eyes: Negative.    Respiratory:  Negative for apnea, cough, shortness of breath and wheezing.    Cardiovascular:  Negative for chest pain, palpitations and leg swelling.   Gastrointestinal:  Negative for abdominal distention,  abdominal pain, blood in stool, constipation, diarrhea and nausea.   Endocrine: Negative for cold intolerance, heat intolerance, polydipsia, polyphagia and polyuria.   Genitourinary:  Negative for decreased urine volume, difficulty urinating, dysuria, flank pain, frequency, hematuria and urgency.        Vaginal odor     Musculoskeletal:  Negative for arthralgias, joint swelling and myalgias.   Skin:  Negative for color change and rash.   Allergic/Immunologic: Negative.    Neurological:  Negative for dizziness, tremors, seizures, syncope, facial asymmetry, speech difficulty, weakness, light-headedness, numbness and headaches.   Hematological:  Negative for adenopathy. Does not bruise/bleed easily.   Psychiatric/Behavioral:  Negative for behavioral problems and confusion.         Medications:  Outpatient Encounter Medications as of 8/16/2023   Medication Sig Dispense Refill    cetirizine (ZYRTEC) 10 MG tablet Take 1 tablet (10 mg total) by mouth every evening. 90 tablet 3    triamcinolone acetonide 0.1% (KENALOG) 0.1 % ointment Apply 1 application topically 2 (two) times daily. Apply to affected area 80 g 6     No facility-administered encounter medications on file as of 8/16/2023.       Allergies:  Review of patient's allergies indicates:  No Known Allergies    Health Maintenance:  Immunization History   Administered Date(s) Administered    DTaP 02/25/2008    DTaP / Hep B / IPV 02/09/2004, 05/17/2004, 07/13/2004    DTaP / HiB 02/08/2005    HPV 9-Valent 07/20/2017, 09/28/2021    Hepatitis A 04/20/2009    Hepatitis A, Pediatric/Adolescent, 2 Dose 08/12/2008, 02/23/2009    MMR 02/08/2005, 02/25/2008    Meningococcal B, recombinant 09/28/2021, 09/06/2022    Meningococcal Conjugate (MCV4P) 07/20/2017    PPD Test 10/11/2022    Tdap 07/20/2017    Varicella 02/08/2005, 02/25/2008    meningococcal Conjugate (MCV4O) 09/28/2021      Health Maintenance   Topic Date Due    Chlamydia Screening  09/06/2023    TETANUS VACCINE   "07/20/2027    Hepatitis C Screening  Completed    Lipid Panel  Completed    HPV Vaccines  Completed        Physical Exam      Vital Signs  Temp: 98.2 °F (36.8 °C)  Temp Source: Oral  Pulse: 83  Resp: 20  SpO2: 98 %  BP: 109/72  BP Location: Left arm  Patient Position: Sitting  Pain Score: 0-No pain  Height and Weight  Height: 5' 4" (162.6 cm)  Weight: 81.2 kg (179 lb)  BSA (Calculated - sq m): 1.91 sq meters  BMI (Calculated): 30.7  Weight in (lb) to have BMI = 25: 145.3]    Physical Exam  Vitals and nursing note reviewed.   Constitutional:       Appearance: Normal appearance. She is obese.   HENT:      Head: Normocephalic.      Nose: Nose normal.      Mouth/Throat:      Mouth: Mucous membranes are moist.      Comments: Geographic tongue  Eyes:      Extraocular Movements: Extraocular movements intact.      Conjunctiva/sclera: Conjunctivae normal.      Pupils: Pupils are equal, round, and reactive to light.   Cardiovascular:      Rate and Rhythm: Normal rate and regular rhythm.      Pulses: Normal pulses.      Heart sounds: Normal heart sounds. No murmur heard.  Pulmonary:      Effort: Pulmonary effort is normal.      Breath sounds: Normal breath sounds. No stridor. No wheezing or rhonchi.   Abdominal:      General: Bowel sounds are normal. There is no distension.      Palpations: Abdomen is soft. There is no mass.      Tenderness: There is no abdominal tenderness.      Comments: Mild right lower pelvic tenderness   Musculoskeletal:         General: No swelling or tenderness. Normal range of motion.      Cervical back: Normal range of motion and neck supple.      Right lower leg: No edema.      Left lower leg: No edema.   Skin:     General: Skin is warm and dry.      Capillary Refill: Capillary refill takes less than 2 seconds.   Neurological:      General: No focal deficit present.      Mental Status: She is alert and oriented to person, place, and time. Mental status is at baseline.      Cranial Nerves: No cranial " nerve deficit.      Sensory: No sensory deficit.      Motor: No weakness.   Psychiatric:         Mood and Affect: Mood normal.         Behavior: Behavior normal.         Thought Content: Thought content normal.         Judgment: Judgment normal.          Over the last two weeks how often have you been bothered by little interest or pleasure in doing things: 0  Over the last two weeks how often have you been bothered by feeling down, depressed or hopeless: 1  PHQ-2 Total Score: 1  PHQ-9 Score: 1  PHQ-9 Interpretation: Minimal or None        Is patient >64 yo of age?  No flowsheet data found.      Laboratory: Fasting labs pending   CBC:      CMP:        LIPIDS:  Recent Labs   Lab 09/06/22  1708   HDL Cholesterol 60   Cholesterol 156   Triglycerides 54   LDL Calculated 85   Cholesterol/HDL Ratio (Risk Factor) 2.6   Non-HDL 96     TSH:      A1C:        Assessment/Plan     Cher Ramos is a 17 y.o.female with:   1. Well adult health check  Comprehensive Metabolic Panel    CBC Auto Differential    Lipid Panel      2. Screening for lipid disorders  Lipid Panel      3. Screening for diabetes mellitus  Comprehensive Metabolic Panel      4. High risk sexual behavior, unspecified type  Chlamydia/GC, PCR    Bacterial Vaginosis      5. Class 1 obesity with body mass index (BMI) of 30.0 to 30.9 in adult, unspecified obesity type, unspecified whether serious comorbidity present  healthy diet, exercise and weight loss recommended                                                         Total time spent face-to-face and non-face-to-face coordinating care for this encounter was: >30 min    Chronic conditions status updated as per HPI.  Other than changes above, cont current medications and maintain follow up with specialists.  Return to clinic 1 year next wellness.    Tracie [facility]

## 2023-08-16 NOTE — LETTER
August 16, 2023    Cher Ramos  [address] [place] [address] [place] [address]             [facility]  Family Medicine  [address] [place] [address] [address] [place] [address]  Phone: [phone]  Fax: [phone]   August 16, 2023     Patient: Cher Ramos   YOB: 2005   Date of Visit: 8/16/2023       To Whom it May Concern:    Cher Ramos was seen in my clinic on 8/16/2023. She may return to school on 8/16/2023 .    Please excuse her from any classes or work missed.    If you have any questions or concerns, please don't hesitate to call.    Sincerely,         Tracie Weinstein, ROBERTP

## 2023-08-17 ENCOUNTER — OFFICE VISIT (OUTPATIENT)
Dept: ORTHOPEDICS | Facility: CLINIC | Age: 18
End: 2023-08-17
Payer: MEDICAID

## 2023-08-17 DIAGNOSIS — M25.561 RIGHT KNEE PAIN, UNSPECIFIED CHRONICITY: ICD-10-CM

## 2023-08-17 DIAGNOSIS — M23.91 INTERNAL DERANGEMENT OF RIGHT KNEE: Primary | ICD-10-CM

## 2023-08-17 DIAGNOSIS — B37.9 YEAST INFECTION: ICD-10-CM

## 2023-08-17 DIAGNOSIS — N76.0 BV (BACTERIAL VAGINOSIS): Primary | ICD-10-CM

## 2023-08-17 DIAGNOSIS — B96.89 BV (BACTERIAL VAGINOSIS): Primary | ICD-10-CM

## 2023-08-17 LAB
CHLAMYDIA BY PCR: NEGATIVE
N. GONORRHOEAE (GC) BY PCR: NEGATIVE

## 2023-08-17 PROCEDURE — 1160F RVW MEDS BY RX/DR IN RCRD: CPT | Mod: CPTII,,, | Performed by: ORTHOPAEDIC SURGERY

## 2023-08-17 PROCEDURE — 1159F MED LIST DOCD IN RCRD: CPT | Mod: CPTII,,, | Performed by: ORTHOPAEDIC SURGERY

## 2023-08-17 PROCEDURE — 99213 OFFICE O/P EST LOW 20 MIN: CPT | Mod: S$PBB,,, | Performed by: ORTHOPAEDIC SURGERY

## 2023-08-17 PROCEDURE — 99213 PR OFFICE/OUTPT VISIT, EST, LEVL III, 20-29 MIN: ICD-10-PCS | Mod: S$PBB,,, | Performed by: ORTHOPAEDIC SURGERY

## 2023-08-17 PROCEDURE — 99213 OFFICE O/P EST LOW 20 MIN: CPT | Mod: PBBFAC | Performed by: ORTHOPAEDIC SURGERY

## 2023-08-17 PROCEDURE — 1159F PR MEDICATION LIST DOCUMENTED IN MEDICAL RECORD: ICD-10-PCS | Mod: CPTII,,, | Performed by: ORTHOPAEDIC SURGERY

## 2023-08-17 PROCEDURE — 1160F PR REVIEW ALL MEDS BY PRESCRIBER/CLIN PHARMACIST DOCUMENTED: ICD-10-PCS | Mod: CPTII,,, | Performed by: ORTHOPAEDIC SURGERY

## 2023-08-17 RX ORDER — METRONIDAZOLE 500 MG/1
500 TABLET ORAL EVERY 12 HOURS
Qty: 14 TABLET | Refills: 0 | Status: SHIPPED | OUTPATIENT
Start: 2023-08-17 | End: 2023-08-17

## 2023-08-17 RX ORDER — FLUCONAZOLE 150 MG/1
TABLET ORAL
Qty: 2 TABLET | Refills: 0 | Status: SHIPPED | OUTPATIENT
Start: 2023-08-17 | End: 2023-08-17

## 2023-08-17 NOTE — PROGRESS NOTES
ASSESSMENT:      ICD-10-CM ICD-9-CM   1. Internal derangement of right knee  M23.91 717.9   2. Right knee pain, unspecified chronicity  M25.561 719.46       PLAN:     Findings and treatment options were discussed with the patient regarding the diagnosis.   All questions were answered regarding Cher Ramos 's painful knee.      Natural history and expected course discussed. Questions answered. Educational materials distributed.  Given the above exam findings, I suspect the patient has a meniscus tear or possibly a chondral injury. I have ordered and reviewed her radiographs today and would like to obtain advanced imaging as this time as I am highly concerned for meniscal, chondral, and/or ligamentous injury in this painful knee.  Typical operative and nonoperative treatment measures were reviewed in great detail today. Risks, benefits, and alternatives as it relates to this potential injury were discussed today.  Plan is to proceed with an MRI to assess for internal derangement. Will follow-up after study to discuss results. Will reconsider treatment options at that time.       There are no Patient Instructions on file for this visit.    IMAGING:   No results found.         CC: Knee pain    17 y.o. Female who presents as a new patient to me for evaluation of  right knee pain.  She was seen by Eugenio Aviles 6 weeks ago and was sent to PT. She states that it is some better now with an occasional pain across the front of her knee. She was on crutches but no longer needs them.  Occupation: student/ works part-time  Pain has been present for 6 weeks.  Injury description kick ball injury a year ago.   Mechanical symptoms, such as clicking, locking, and popping are present.    Swelling and effusions  are not present.   The patient does not  describe symptoms of knee instability, such as the knee buckling and the knee giving way.   Symptoms are worsened with activity.  Better with rest. Treatment thus far has  included rest, activity modifications, and oral medications.    she  has had formal physical therapy.  she has not had prior injections injections into the knee.   she has not had previous advanced imaging such as MRI.     Here today to discuss diagnosis and treatment options.          REVIEW OF SYSTEMS:   Constitution: Negative. Negative for chills, fever and night sweats.    Hematologic/Lymphatic: Negative for bleeding problem. Does not bruise/bleed easily.   Skin: Negative for dry skin, itching and rash.   Musculoskeletal: Negative for falls. Positive for knee pain and muscle weakness.     All other review of symptoms were reviewed and found to be noncontributory.     PAST MEDICAL HISTORY:   Past Medical History:   Diagnosis Date    Allergy     Eczema        PAST SURGICAL HISTORY:   Past Surgical History:   Procedure Laterality Date    WISDOM TOOTH EXTRACTION Bilateral        FAMILY HISTORY:   Family History   Problem Relation Age of Onset    Sickle cell trait Mother     Sickle cell trait Maternal Aunt     Diabetes Maternal Uncle     Sickle cell trait Maternal Uncle     Hypertension Maternal Grandmother     Sickle cell trait Maternal Grandfather        SOCIAL HISTORY:   Social History     Socioeconomic History    Marital status: Single   Tobacco Use    Smoking status: Never    Smokeless tobacco: Never   Substance and Sexual Activity    Alcohol use: Never    Drug use: Never    Sexual activity: Yes     Partners: Male     Birth control/protection: Condom, Pill   Social History Narrative    Lives at home with mother step dad and two brothers    No pets in home    Smoking inside home        MEDICATIONS:     Current Outpatient Medications:     cetirizine (ZYRTEC) 10 MG tablet, Take 1 tablet (10 mg total) by mouth every evening., Disp: 90 tablet, Rfl: 3    ALLERGIES:   Review of patient's allergies indicates:  No Known Allergies     PHYSICAL EXAMINATION:    General    Constitutional: She is oriented to person, place,  and time. She appears well-developed and well-nourished.   HENT:   Head: Normocephalic and atraumatic.   Nose: Nose normal.   Eyes: EOM are normal. Pupils are equal, round, and reactive to light.   Neck: Neck supple.   Cardiovascular:  Normal rate and intact distal pulses.            Pulmonary/Chest: Effort normal and breath sounds normal. No respiratory distress. She exhibits no tenderness.   Abdominal: Soft. She exhibits no distension. There is no abdominal tenderness.   Neurological: She is alert and oriented to person, place, and time. She has normal reflexes. No cranial nerve deficit. She exhibits normal muscle tone. Coordination normal.   Psychiatric: She has a normal mood and affect. Her behavior is normal. Judgment and thought content normal.           Right Knee Exam     Inspection   Swelling: present  Deformity: absent    Tenderness   The patient is tender to palpation of the medial retinaculum and medial joint line.    Crepitus   The patient has crepitus of the medial joint line and medial plica.    Range of Motion   Extension:  normal   Flexion:  abnormal     Tests   Meniscus   Nadeem:  Medial - positive   Ligament Examination   Lachman: normal (-1 to 2mm)   MCL - Valgus: normal (0 to 2mm)  LCL - Varus: normal  Pivot Shift: normal (Equal)  Dial Test at 30 degrees: normal (< 5 degrees)  Posterolateral Corner: unstable (>15 degrees difference)  Patella   Patellar apprehension: negative  Patellar Grind: positive    Other   Sensation: normal    Comments:  Pain with Thessaly Maneuver    Left Knee Exam   Left knee exam is normal.    Inspection   Scars: absent    Muscle Strength   Right Lower Extremity   Hip Abduction: 5/5   Quadriceps:  5/5   Hamstrin/5     Reflexes     Right Side   Achilles:  2+  Quadriceps:  2+    Vascular Exam     Right Pulses  Dorsalis Pedis:      2+  Posterior Tibial:      2+            Orders Placed This Encounter   Procedures    MRI Knee Without Contrast Right     Standing  Status:   Future     Standing Expiration Date:   8/17/2024     Order Specific Question:   Does the patient have a pacemaker or a defibrillator (Note: Some facilities may not be able to schedule an MRI for patients with pacemakers and defibrillators. You should contact your local radiology department to determine if this is the case.)?     Answer:   No     Order Specific Question:   Does the patient have an aneurysm or surgical clip, pump, nerve/brain stimulator, middle/inner ear prosthesis, or other metal implant or foreign object (bullet, shrapnel)? If they have a card related to their implant, ask them to bring it. Issues related to the implant may cause the MRI to be delayed.     Answer:   No     Order Specific Question:   Is the patient claustrophobic?     Answer:   No     Order Specific Question:   Will the patient require sedation?     Answer:   No     Order Specific Question:   Does the patient have any of the following conditions? Diabetes, History of Renal Disease or Hypertension requiring medical therapy?     Answer:   No     Order Specific Question:   May the Radiologist modify the order per protocol to meet the clinical needs of the patient?     Answer:   Yes     Order Specific Question:   Is this part of a Research Study?     Answer:   No     Order Specific Question:   Does the patient have on a skin patch for medication with aluminized backing?     Answer:   No       Procedures

## 2023-08-18 RX ORDER — FLUCONAZOLE 150 MG/1
150 TABLET ORAL DAILY
Qty: 2 TABLET | Refills: 0 | Status: SHIPPED | OUTPATIENT
Start: 2023-08-18 | End: 2023-08-20

## 2023-08-18 RX ORDER — METRONIDAZOLE 500 MG/1
500 TABLET ORAL EVERY 12 HOURS
Qty: 14 TABLET | Refills: 0 | Status: SHIPPED | OUTPATIENT
Start: 2023-08-18 | End: 2023-08-25

## 2023-09-05 ENCOUNTER — TELEPHONE (OUTPATIENT)
Dept: ORTHOPEDICS | Facility: CLINIC | Age: 18
End: 2023-09-05
Payer: MEDICAID

## 2023-09-05 PROBLEM — M23.91 ACUTE INTERNAL DERANGEMENT OF RIGHT KNEE: Status: RESOLVED | Noted: 2023-07-11 | Resolved: 2023-09-05

## 2023-09-05 PROBLEM — R29.898 WEAKNESS OF RIGHT LEG: Status: RESOLVED | Noted: 2023-07-11 | Resolved: 2023-09-05

## 2023-09-05 PROBLEM — M25.661 DECREASED RANGE OF MOTION (ROM) OF RIGHT KNEE: Status: RESOLVED | Noted: 2023-07-11 | Resolved: 2023-09-05

## 2023-09-05 PROBLEM — M25.561 ACUTE PAIN OF RIGHT KNEE: Status: RESOLVED | Noted: 2023-07-11 | Resolved: 2023-09-05

## 2023-09-05 NOTE — PLAN OF CARE
[facility]   Physical Therapy Discharge Summary      Name: Cher Cartwrightman  Clinic Number: [id number]    Therapy Diagnosis:   No diagnosis found.    Physician: Eugenio Aviles FNP    Visit Date: 8/15/2023    Physician Orders: PT Eval and Treat   Medical Diagnosis from Referral: Right Knee Pain  Evaluation Date: 7/11/2023  Authorization Period Expiration: Medicaid Managed   Plan of Care Expiration: 9/8/2023   Progress Note Due: As Needed   Visit # / Visits authorized: 9 / Medicaid Managed    FOTO: 69/100  PTA Visit #: 1/5     Time In: 3:03 pm   Time Out: 4:10 pm  Total Billable Time: 55 minutes    Subjective     Pt reports: she is still unable to straighten the Right Knee. She reports her pain at rest is 0/10 and with activity it increases to 3/10.   She was compliant with home exercise program.    Pain: 2-3/10  Location: right knee      Objective      Extension AROM -5 degrees   Quad lag -20 w/ short arc quad; -15 degrees w/ straight leg raise  Knee Flexion 140 degrees     Treatment     Cher received the treatments listed below:      therapeutic exercises to develop strength, endurance, ROM, flexibility, posture, and core stabilization for 15 minutes including:    Bike 5 minutes   Slant board stretch 3 x 30 second  Hamstring stretch on step 2 x 20 second hold   Knee flexion stretch on step 2 x 20 second hold   Knee flexion ball rolls with red peanut x 20    neuromuscular re-education activities to improve: Balance, Coordination, Kinesthetic, Sense, Proprioception, and Posture for 25 minutes. The following activities were included:    Prone TERMINAL KNEE EXTENSION 15x10 sec hold  SB with QS 10x10 sec hold  Calf Raises off step to promote knee extension 30x    Polish e-stim x 15 minutes, 10 sec on/20 sec off ; 2 sec ramp time; 5 mins QS with heel prop, SAQ, and SLR    gait training to improve functional mobility and safety for 0 minutes, including:  Using axillary crutch on  left side with emphasis on advancing RLE and crutch on left side together. Cues and demo for terminal knee extension with heel strike    direct contact modalities after being cleared for contraindications:   Patient received the following supervised modalities after being cleared for contradictions: Pre-Mod Electrical Stimulation:  Patient received Pre-Mod Electrical Stimulation for pain control applied to the Right Knee. Pt received stimulation at a frequency of  Hz for 15 minutes. Patient tolerated treatment well without any adverse effects.       Cold pack for -  15 minutes to Right Knee.    Patient Education and Home Exercises       Education provided:   - avoid resting knee on pillows/in flexion; focus on terminal knee extension with gait; continue current home exercise program     Written Home Exercises Provided: yes. Exercises were reviewed and Cher was able to demonstrate them prior to the end of the session.  Cher demonstrated good  understanding of the education provided. See EMR under Patient Instructions for exercises provided during therapy sessions    Assessment     Patient has received Physical Therapy for 6 weeks. We have been able to make good progress with patient on strength  and Range of Motion but she continues to be unable to fully straighten the Right knee. Her Right Knee Extension Range of Motion is now -5 degrees. She also has an extension lag of -15 degrees with straight leg raises. She is unable to fully extend with ambulation as well causing decreased stance time and antalgic gait on the Right. Therapist has worked with patient on increasing quad strength especially with terminal knee extension. Using Russian Stim along with Quad sets, Short Arc Quads, and Straight Leg Raises in order to help increase quality of her quad contraction. She has responded well but remains with pain and dysfunction in that knee. She does go see her Ortho MD, Dr Geovany Vega, on Thursday, 8/17/2023,  "to discuss further options for the Right Knee. We will await any new orders from Ortho MD.           [facility] from evaluation:  Cher is a 17 y.o. female referred to outpatient Physical Therapy with a medical diagnosis of Internal derangement of Right Knee causing Right knee pain. Cher reports: She was playing kickball one year ago and fell in a hole. Her knee popped at that time and was painful but she never saw a doctor. It has given her problems off and on since that time, but she has been able to walk with no difficulty. However, on Friday, 7/7/2023, she was standing at work and pivoted on her Right knee and felt it "pop." She had severe pain and swelling at that time and was unable to bear weight on that leg at all.  Most of her pain is medial. At this time, she can bend the knee without too much pain but she is unable to straighten it. She is on crutches today and states she is unable to put much pressure through that Right knee when she tries to walk. She has pain that is usually around 7/10. She has seen Dr Eugenio Aviles in Dr Lozano's office. No MRI has been done but she was referred to Outpatient Physical Therapy.   Patient presents with decreased Range of Motion in the Right knee, especially in regards to Extension. She is unable to get the Right Knee straight at all and lacks 20 degrees of extension even with assistance of the Therapist. Patient was positive for possible medial meniscus tear during the special tests during the Evaluation. Unable to determine if the patella is stable or not at this time as she is very guarded and painful at this time. She is tender to palpation along the medial joint line and the pes anserine. Medial, Superior, and Inferior border of the patella is also very tender to palpation. Feel patient would benefit from MRI or other testing to help determine the cause of her pain and dysfunction. She is unable to bear weight through the Right Lower Extremity at this time and " is having to use axillary crutches with swing through gait for mobility.       Cher Is progressing fairly towards her goals.   Pt prognosis is fair-Good.     Pt will continue to benefit from skilled outpatient physical therapy to address the deficits listed in the problem list box on initial evaluation, provide pt/family education and to maximize pt's level of independence in the home and community environment.     Anticipated barriers to physical therapy: home exercise program compliance    Goals: Short Term   Goals: 4 weeks   1. Independent with Home Exercise Program   2. Increase Right Knee Range of Motion to 0 Degrees to 120 Degrees  3. Increase Right Knee Strength to grossly 4/5  4. Patient will ambulate 500 feet with one crutch and with complaints of pain Less than or Equal to 4/10.       Long Term Goals: 8 weeks   1. Patient will increase Right Knee Strength to grossly 5/5  2. Patient will ambulate 1000+ feet without an assistive device and with complaints of pain Less than or Equal to 3/10.   3. Refer patient back to MD at end of Therapy for further assessment and possible MRI if pain and dysfunction persist.          Addendum for Discharge from Therapy :     Patient saw Ortho MD on 8/17/2023 and MD ordered an MRI. Patient underwent MRI on 8/31/2023. MRI showed a bucket handle tear of the meniscus and ACL tear. Patient will follow back up with Ortho MD on 9/7/2023 to discuss surgical options.  Patient did not return to Therapy following this treatment on 8/15/2023. Therefore patient is discharged from Physical Therapy services at current level of function as listed above.         Discharge reason: Patient has not attended therapy since 8/15/2023    Plan   This patient is discharged from Physical Therapy.    Date of Last visit: 8/15/2023  Total Visits Received: 9  Cancelled Visits: 3  No Show Visits: 2    ERIKAANDREW ROME, PT, DPT

## 2023-09-05 NOTE — TELEPHONE ENCOUNTER
PATIENT NOTIFIED; APPOINTMENT SCHEDULED WITH DR. VEGA     ----- Message from Geovany Vega MD sent at 9/1/2023  4:40 PM CDT -----  Come back on Thurs to discuss-- has a bucket handle and ACL tear

## 2023-09-07 ENCOUNTER — OFFICE VISIT (OUTPATIENT)
Dept: ORTHOPEDICS | Facility: CLINIC | Age: 18
End: 2023-09-07
Payer: MEDICAID

## 2023-09-07 DIAGNOSIS — S83.511A RUPTURE OF ANTERIOR CRUCIATE LIGAMENT OF RIGHT KNEE, INITIAL ENCOUNTER: Primary | ICD-10-CM

## 2023-09-07 DIAGNOSIS — S83.241A ACUTE MEDIAL MENISCUS TEAR OF RIGHT KNEE, INITIAL ENCOUNTER: ICD-10-CM

## 2023-09-07 DIAGNOSIS — Z01.818 PREPROCEDURAL EXAMINATION: Primary | ICD-10-CM

## 2023-09-07 DIAGNOSIS — S83.511A RUPTURE OF ANTERIOR CRUCIATE LIGAMENT OF RIGHT KNEE: ICD-10-CM

## 2023-09-07 PROCEDURE — 99212 OFFICE O/P EST SF 10 MIN: CPT | Mod: PBBFAC | Performed by: ORTHOPAEDIC SURGERY

## 2023-09-07 PROCEDURE — 99214 PR OFFICE/OUTPT VISIT, EST, LEVL IV, 30-39 MIN: ICD-10-PCS | Mod: S$PBB,,, | Performed by: ORTHOPAEDIC SURGERY

## 2023-09-07 PROCEDURE — 99214 OFFICE O/P EST MOD 30 MIN: CPT | Mod: S$PBB,,, | Performed by: ORTHOPAEDIC SURGERY

## 2023-09-07 RX ORDER — SODIUM CHLORIDE 9 MG/ML
INJECTION, SOLUTION INTRAVENOUS CONTINUOUS
Status: CANCELLED | OUTPATIENT
Start: 2023-09-07

## 2023-09-07 RX ORDER — MUPIROCIN 20 MG/G
OINTMENT TOPICAL
Status: CANCELLED | OUTPATIENT
Start: 2023-09-07

## 2023-09-07 NOTE — LETTER
September 7, 2023      [facility]  [address] [place] [id number]  Phone: [phone]  Fax: [phone]       Patient: Cher Ramos   YOB: 2005  Date of Visit: 09/07/2023    To Whom It May Concern:    Sekou Ramos  was at [facility] on 09/07/2023. The patient will be having knee surgery on 10/02/2023. If you have any questions or concerns, or if I can be of further assistance, please do not hesitate to contact me.    Sincerely,    Dr Geovany Vega

## 2023-09-07 NOTE — PROGRESS NOTES
Pt is here for MRI follow up and treatment moving forward.    CC:  Knee pain    17 y.o. Female returns to clinic for a follow up visit regarding knee pain.              Past Medical History:   Diagnosis Date    Allergy     Eczema      Past Surgical History:   Procedure Laterality Date    WISDOM TOOTH EXTRACTION Bilateral          PHYSICAL EXAMINATION:  LMP 2023   General    Nursing note and vitals reviewed.  HENT:   Nose: Nose normal.   Pulmonary/Chest: No respiratory distress. She exhibits no tenderness.   Abdominal: Soft. She exhibits no distension. There is no abdominal tenderness.   Psychiatric: Thought content normal.           Right Knee Exam     Tenderness   The patient is tender to palpation of the lateral joint line.    Range of Motion   Extension:  abnormal   Flexion:  abnormal     Tests   Meniscus   Nadeem:  Medial - positive   Ligament Examination   Lachman: abnormal   MCL - Valgus: normal (0 to 2mm)  Pivot Shift: abnormal    Muscle Strength   Right Lower Extremity   Quadriceps:  4/5   Hamstrin/5         IMAGING:  MRI Knee Without Contrast Right    Result Date: 2023  EXAMINATION: MRI KNEE WITHOUT CONTRAST RIGHT CLINICAL HISTORY: Pain in right kneeRIGHT KNEE PAIN; COMPARISON: None TECHNIQUE: Magnetic resonance imaging of the right knee was performed without the use of intravenous contrast. FINDINGS: Medial meniscus: There is a complex bucket-handle tear involving the medial meniscus with flap extending from anterior to posterior at the level of the tibial spine. Lateral meniscus: The lateral meniscus is grossly intact. Anterior cruciate ligament: Suspect moderate to high-grade tearing of the proximal ACL near the femoral insertion.  Correlate with anterior drawer. Posterior cruciate ligament: The posterior cruciate ligament is grossly intact. Medial collateral ligament: The medial collateral ligament is grossly intact. Lateral collateral ligament: The lateral collateral ligament is  grossly  intact. Popliteus tendon: The popliteus tendon is grossly  intact. Extensor mechanism: The patellar tendon and the visualized portion of the quadriceps tendon are grossly intact. Osseous structures and cartilage: Mild patchy marrow edema involving the lateral aspect of the lateral femoral condyle as well as the dorsal aspects of the medial and lateral tibial plateau. Joint fluid: There is no significant knee joint effusion.  There is no significant Baker's cyst.     There is a complex bucket-handle tear involving the medial meniscus with flap extending from anterior to posterior at the level of the tibial spine. Suspect moderate to high-grade tearing of the proximal ACL near the femoral insertion. Correlate with anterior drawer. Mild patchy marrow edema involving the lateral aspect of the lateral femoral condyle as well as the dorsal aspects of the medial and lateral tibial plateau. Point of Service: [facility] Electronically signed by: Tony Hammer Date:    08/31/2023 Time:    15:56       ASSESSMENT:      ICD-10-CM ICD-9-CM   1. Rupture of anterior cruciate ligament of right knee, initial encounter  S83.511A 844.2   2. Acute medial meniscus tear of right knee, initial encounter  S83.241A 836.0       PLAN:     -Findings and treatment options were discussed with the patient  -All questions answered  Natural history and expected course discussed. Questions answered.  Educational materials distributed.    The spectrum of treatment options were discussed with the patient, including nonoperative and operative options.  We have discussed the surgery and recovery of arthroscopic knee surgery. she understands that there may be limited weightbearing up to several weeks after surgery depending on procedures that are performed at the time of surgery.    After thorough discussion, the patient has elected to undergo surgical treatment to include:  right   A. Knee arthroscopic rt Anterior Cruciate Ligament  reconstruction with quad tendon autograft    B. Knee arthroscopic rt possible meniscus repair      Informed consent for the procedure was obtained . The details of the procedure along with the expected postop rehab and recovery course were reviewed. Alternatives both operative and non-operative with associated risks and benefits discussed. The outlined risks and potential complications of the proposed procedure include but are not limited to: infection, poor wound healing, scarring, stiffness, swelling, continued or recurrent pain, instability, hardware or prosthetic failure, symptomatic hardware requiring removal, weakness, neurovascular injury, numbness, chronic regional pain disorder, tissue nonhealing/irreparability/retear, contralateral ligament injury, chondral injury, arthritis, fracture, blood clot formation, inability to return to previous level of activity, anesthetic or regional block complication up to death, need for additional procedure as indicated, and potential need for further surgery.     she was also informed and understands the risks of surgery are greater for patients with a current condition or history of heart disease, obesity, clotting disorders, recurrent infections, steroid use, current or past smoking, and factors such as sedentary lifestyle and noncompliance with medications, therapy or follow-up. The degree of the increased risk is hard to estimate with any degree of precision.    All questions were answered. The patient has verbalized understanding of these issues and wishes to proceed as discussed.       There are no Patient Instructions on file for this visit.      No orders of the defined types were placed in this encounter.        Procedures

## 2023-09-07 NOTE — LETTER
September 7, 2023      [facility]  [address] [place] [id number]  Phone: [phone]  Fax: [phone]       Patient: Cher Ramos   YOB: 2005  Date of Visit: 09/07/2023    To Whom It May Concern:    Sekou Ramos  was at [facility] on 09/07/2023. The patient may return to  school on 09/07/2023 with no restrictions. If you have any questions or concerns, or if I can be of further assistance, please do not hesitate to contact me.    Sincerely,    Dr Geovany Vega

## 2023-09-10 ENCOUNTER — PATIENT MESSAGE (OUTPATIENT)
Dept: FAMILY MEDICINE | Facility: CLINIC | Age: 18
End: 2023-09-10
Payer: MEDICAID

## 2023-09-11 RX ORDER — TRIAMCINOLONE ACETONIDE 1 MG/G
CREAM TOPICAL 2 TIMES DAILY PRN
Qty: 80 G | Refills: 2 | Status: SHIPPED | OUTPATIENT
Start: 2023-09-11

## 2023-10-18 ENCOUNTER — PATIENT MESSAGE (OUTPATIENT)
Dept: FAMILY MEDICINE | Facility: CLINIC | Age: 18
End: 2023-10-18
Payer: MEDICAID

## 2023-11-13 ENCOUNTER — PATIENT MESSAGE (OUTPATIENT)
Dept: SURGERY | Facility: HOSPITAL | Age: 18
End: 2023-11-13
Payer: MEDICAID

## 2023-11-20 ENCOUNTER — ANESTHESIA (OUTPATIENT)
Dept: SURGERY | Facility: HOSPITAL | Age: 18
End: 2023-11-20
Payer: MEDICAID

## 2023-11-20 ENCOUNTER — ANESTHESIA EVENT (OUTPATIENT)
Dept: SURGERY | Facility: HOSPITAL | Age: 18
End: 2023-11-20
Payer: MEDICAID

## 2023-11-20 ENCOUNTER — HOSPITAL ENCOUNTER (OUTPATIENT)
Facility: HOSPITAL | Age: 18
Discharge: HOME | End: 2023-11-20
Attending: ORTHOPAEDIC SURGERY | Admitting: ORTHOPAEDIC SURGERY
Payer: MEDICAID

## 2023-11-20 VITALS
HEART RATE: 82 BPM | WEIGHT: 180 LBS | DIASTOLIC BLOOD PRESSURE: 88 MMHG | TEMPERATURE: 98 F | SYSTOLIC BLOOD PRESSURE: 136 MMHG | OXYGEN SATURATION: 95 % | RESPIRATION RATE: 16 BRPM | BODY MASS INDEX: 30.73 KG/M2 | HEIGHT: 64 IN

## 2023-11-20 DIAGNOSIS — S83.511A RUPTURE OF ANTERIOR CRUCIATE LIGAMENT OF RIGHT KNEE: Primary | ICD-10-CM

## 2023-11-20 DIAGNOSIS — S83.511A RUPTURE OF ANTERIOR CRUCIATE LIGAMENT OF RIGHT KNEE, INITIAL ENCOUNTER: ICD-10-CM

## 2023-11-20 PROBLEM — S83.211A BUCKET-HANDLE TEAR OF MEDIAL MENISCUS OF RIGHT KNEE AS CURRENT INJURY: Status: ACTIVE | Noted: 2023-11-20

## 2023-11-20 PROCEDURE — 27201423 OPTIME MED/SURG SUP & DEVICES STERILE SUPPLY: Performed by: ORTHOPAEDIC SURGERY

## 2023-11-20 PROCEDURE — 71000016 HC POSTOP RECOV ADDL HR: Performed by: ORTHOPAEDIC SURGERY

## 2023-11-20 PROCEDURE — D9220A PRA ANESTHESIA: ICD-10-PCS | Mod: ANES,,, | Performed by: ANESTHESIOLOGY

## 2023-11-20 PROCEDURE — 71000033 HC RECOVERY, INTIAL HOUR: Performed by: ORTHOPAEDIC SURGERY

## 2023-11-20 PROCEDURE — 29881 ARTHRS KNE SRG MNISECTMY M/L: CPT | Mod: 59,RT,, | Performed by: ORTHOPAEDIC SURGERY

## 2023-11-20 PROCEDURE — 25000003 PHARM REV CODE 250: Performed by: ORTHOPAEDIC SURGERY

## 2023-11-20 PROCEDURE — C1769 GUIDE WIRE: HCPCS | Performed by: ORTHOPAEDIC SURGERY

## 2023-11-20 PROCEDURE — 29882 PR KNEE SCOPE,MED OR LAT MENIS REPAIR: ICD-10-PCS | Mod: 51,RT,, | Performed by: ORTHOPAEDIC SURGERY

## 2023-11-20 PROCEDURE — C1713 ANCHOR/SCREW BN/BN,TIS/BN: HCPCS | Performed by: ORTHOPAEDIC SURGERY

## 2023-11-20 PROCEDURE — D9220A PRA ANESTHESIA: ICD-10-PCS | Mod: CRNA,,, | Performed by: NURSE ANESTHETIST, CERTIFIED REGISTERED

## 2023-11-20 PROCEDURE — 97161 PT EVAL LOW COMPLEX 20 MIN: CPT

## 2023-11-20 PROCEDURE — 29882 ARTHRS KNE SRG MNISC RPR M/L: CPT | Mod: 51,RT,, | Performed by: ORTHOPAEDIC SURGERY

## 2023-11-20 PROCEDURE — 25000003 PHARM REV CODE 250: Performed by: NURSE ANESTHETIST, CERTIFIED REGISTERED

## 2023-11-20 PROCEDURE — D9220A PRA ANESTHESIA: Mod: ANES,,, | Performed by: ANESTHESIOLOGY

## 2023-11-20 PROCEDURE — 36000711: Performed by: ORTHOPAEDIC SURGERY

## 2023-11-20 PROCEDURE — 63600175 PHARM REV CODE 636 W HCPCS: Performed by: ORTHOPAEDIC SURGERY

## 2023-11-20 PROCEDURE — 64447 PERIPHERAL BLOCK: ICD-10-PCS | Mod: 59,RT,, | Performed by: ANESTHESIOLOGY

## 2023-11-20 PROCEDURE — 64447 NJX AA&/STRD FEMORAL NRV IMG: CPT | Mod: 59,RT,, | Performed by: ANESTHESIOLOGY

## 2023-11-20 PROCEDURE — 63600175 PHARM REV CODE 636 W HCPCS: Performed by: NURSE ANESTHETIST, CERTIFIED REGISTERED

## 2023-11-20 PROCEDURE — 37000009 HC ANESTHESIA EA ADD 15 MINS: Performed by: ORTHOPAEDIC SURGERY

## 2023-11-20 PROCEDURE — D9220A PRA ANESTHESIA: Mod: CRNA,,, | Performed by: NURSE ANESTHETIST, CERTIFIED REGISTERED

## 2023-11-20 PROCEDURE — 29888 PR KNEE SCOPE,AID ANT CRUCIATE REPAIR: ICD-10-PCS | Mod: RT,,, | Performed by: ORTHOPAEDIC SURGERY

## 2023-11-20 PROCEDURE — 37000008 HC ANESTHESIA 1ST 15 MINUTES: Performed by: ORTHOPAEDIC SURGERY

## 2023-11-20 PROCEDURE — 63600175 PHARM REV CODE 636 W HCPCS: Performed by: ANESTHESIOLOGY

## 2023-11-20 PROCEDURE — 29888 ARTHRS AID ACL RPR/AGMNTJ: CPT | Mod: RT,,, | Performed by: ORTHOPAEDIC SURGERY

## 2023-11-20 PROCEDURE — 29881 PR KNEE SCOPE SINGLE MENISECECTOMY: ICD-10-PCS | Mod: 59,RT,, | Performed by: ORTHOPAEDIC SURGERY

## 2023-11-20 PROCEDURE — 36000710: Performed by: ORTHOPAEDIC SURGERY

## 2023-11-20 PROCEDURE — 71000015 HC POSTOP RECOV 1ST HR: Performed by: ORTHOPAEDIC SURGERY

## 2023-11-20 DEVICE — IMPLANTABLE DEVICE: Type: IMPLANTABLE DEVICE | Site: KNEE | Status: FUNCTIONAL

## 2023-11-20 DEVICE — WIRE SET PASSING GRAFT: Type: IMPLANTABLE DEVICE | Site: KNEE | Status: FUNCTIONAL

## 2023-11-20 DEVICE — DEVICE FIX TIGHTROPE FIBERTAG: Type: IMPLANTABLE DEVICE | Site: KNEE | Status: FUNCTIONAL

## 2023-11-20 DEVICE — IMP SEC FIX PEEK SWIVEL LOCK: Type: IMPLANTABLE DEVICE | Site: KNEE | Status: FUNCTIONAL

## 2023-11-20 RX ORDER — CEFAZOLIN SODIUM 1 G/3ML
INJECTION, POWDER, FOR SOLUTION INTRAMUSCULAR; INTRAVENOUS
Status: DISCONTINUED | OUTPATIENT
Start: 2023-11-20 | End: 2023-11-20

## 2023-11-20 RX ORDER — OXYCODONE HYDROCHLORIDE 5 MG/1
5 TABLET ORAL
Status: DISCONTINUED | OUTPATIENT
Start: 2023-11-20 | End: 2023-11-20 | Stop reason: HOSPADM

## 2023-11-20 RX ORDER — PROPOFOL 10 MG/ML
VIAL (ML) INTRAVENOUS
Status: DISCONTINUED | OUTPATIENT
Start: 2023-11-20 | End: 2023-11-20

## 2023-11-20 RX ORDER — SODIUM CHLORIDE, SODIUM LACTATE, POTASSIUM CHLORIDE, CALCIUM CHLORIDE 600; 310; 30; 20 MG/100ML; MG/100ML; MG/100ML; MG/100ML
125 INJECTION, SOLUTION INTRAVENOUS CONTINUOUS
Status: DISCONTINUED | OUTPATIENT
Start: 2023-11-20 | End: 2023-11-20 | Stop reason: HOSPADM

## 2023-11-20 RX ORDER — OXYCODONE HYDROCHLORIDE 5 MG/1
10 TABLET ORAL EVERY 4 HOURS PRN
Status: DISCONTINUED | OUTPATIENT
Start: 2023-11-20 | End: 2023-11-20 | Stop reason: HOSPADM

## 2023-11-20 RX ORDER — MUPIROCIN 20 MG/G
OINTMENT TOPICAL
Status: DISCONTINUED | OUTPATIENT
Start: 2023-11-20 | End: 2023-11-20 | Stop reason: HOSPADM

## 2023-11-20 RX ORDER — SODIUM CHLORIDE 9 MG/ML
INJECTION, SOLUTION INTRAVENOUS CONTINUOUS
Status: DISCONTINUED | OUTPATIENT
Start: 2023-11-20 | End: 2023-11-20 | Stop reason: HOSPADM

## 2023-11-20 RX ORDER — DEXAMETHASONE SODIUM PHOSPHATE 4 MG/ML
INJECTION, SOLUTION INTRA-ARTICULAR; INTRALESIONAL; INTRAMUSCULAR; INTRAVENOUS; SOFT TISSUE
Status: DISCONTINUED | OUTPATIENT
Start: 2023-11-20 | End: 2023-11-20

## 2023-11-20 RX ORDER — DIPHENHYDRAMINE HYDROCHLORIDE 50 MG/ML
25 INJECTION INTRAMUSCULAR; INTRAVENOUS EVERY 6 HOURS PRN
Status: DISCONTINUED | OUTPATIENT
Start: 2023-11-20 | End: 2023-11-20 | Stop reason: HOSPADM

## 2023-11-20 RX ORDER — HYDROMORPHONE HYDROCHLORIDE 2 MG/ML
0.5 INJECTION, SOLUTION INTRAMUSCULAR; INTRAVENOUS; SUBCUTANEOUS EVERY 5 MIN PRN
Status: DISCONTINUED | OUTPATIENT
Start: 2023-11-20 | End: 2023-11-20 | Stop reason: HOSPADM

## 2023-11-20 RX ORDER — OXYCODONE AND ACETAMINOPHEN 10; 325 MG/1; MG/1
1 TABLET ORAL EVERY 6 HOURS PRN
Qty: 30 TABLET | Refills: 0 | Status: SHIPPED | OUTPATIENT
Start: 2023-11-20 | End: 2023-12-14

## 2023-11-20 RX ORDER — LIDOCAINE HYDROCHLORIDE 10 MG/ML
1 INJECTION, SOLUTION EPIDURAL; INFILTRATION; INTRACAUDAL; PERINEURAL ONCE
Status: DISCONTINUED | OUTPATIENT
Start: 2023-11-20 | End: 2023-11-20 | Stop reason: HOSPADM

## 2023-11-20 RX ORDER — TRANEXAMIC ACID 100 MG/ML
INJECTION, SOLUTION INTRAVENOUS
Status: DISCONTINUED | OUTPATIENT
Start: 2023-11-20 | End: 2023-11-20

## 2023-11-20 RX ORDER — LIDOCAINE HYDROCHLORIDE 20 MG/ML
INJECTION, SOLUTION EPIDURAL; INFILTRATION; INTRACAUDAL; PERINEURAL
Status: DISCONTINUED | OUTPATIENT
Start: 2023-11-20 | End: 2023-11-20

## 2023-11-20 RX ORDER — EPINEPHRINE CONVENIENCE KIT 1 MG/ML(1)
KIT INTRAMUSCULAR; SUBCUTANEOUS
Status: DISCONTINUED | OUTPATIENT
Start: 2023-11-20 | End: 2023-11-20 | Stop reason: HOSPADM

## 2023-11-20 RX ORDER — ROPIVACAINE HYDROCHLORIDE 7.5 MG/ML
INJECTION, SOLUTION EPIDURAL; PERINEURAL
Status: COMPLETED | OUTPATIENT
Start: 2023-11-20 | End: 2023-11-20

## 2023-11-20 RX ORDER — FENTANYL CITRATE 50 UG/ML
INJECTION, SOLUTION INTRAMUSCULAR; INTRAVENOUS
Status: DISCONTINUED | OUTPATIENT
Start: 2023-11-20 | End: 2023-11-20

## 2023-11-20 RX ORDER — OXYCODONE AND ACETAMINOPHEN 5; 325 MG/1; MG/1
1 TABLET ORAL EVERY 4 HOURS PRN
Status: DISCONTINUED | OUTPATIENT
Start: 2023-11-20 | End: 2023-11-20 | Stop reason: HOSPADM

## 2023-11-20 RX ORDER — SODIUM CHLORIDE, SODIUM LACTATE, POTASSIUM CHLORIDE, CALCIUM CHLORIDE 600; 310; 30; 20 MG/100ML; MG/100ML; MG/100ML; MG/100ML
INJECTION, SOLUTION INTRAVENOUS CONTINUOUS PRN
Status: DISCONTINUED | OUTPATIENT
Start: 2023-11-20 | End: 2023-11-20

## 2023-11-20 RX ORDER — ONDANSETRON 4 MG/1
4 TABLET, ORALLY DISINTEGRATING ORAL EVERY 6 HOURS PRN
Qty: 30 TABLET | Refills: 0 | Status: SHIPPED | OUTPATIENT
Start: 2023-11-20 | End: 2023-12-14

## 2023-11-20 RX ORDER — ONDANSETRON 2 MG/ML
4 INJECTION INTRAMUSCULAR; INTRAVENOUS DAILY PRN
Status: DISCONTINUED | OUTPATIENT
Start: 2023-11-20 | End: 2023-11-20 | Stop reason: HOSPADM

## 2023-11-20 RX ORDER — MEPERIDINE HYDROCHLORIDE 25 MG/ML
25 INJECTION INTRAMUSCULAR; INTRAVENOUS; SUBCUTANEOUS EVERY 10 MIN PRN
Status: DISCONTINUED | OUTPATIENT
Start: 2023-11-20 | End: 2023-11-20 | Stop reason: HOSPADM

## 2023-11-20 RX ORDER — ONDANSETRON 2 MG/ML
INJECTION INTRAMUSCULAR; INTRAVENOUS
Status: DISCONTINUED | OUTPATIENT
Start: 2023-11-20 | End: 2023-11-20

## 2023-11-20 RX ORDER — MORPHINE SULFATE 10 MG/ML
4 INJECTION INTRAMUSCULAR; INTRAVENOUS; SUBCUTANEOUS EVERY 5 MIN PRN
Status: DISCONTINUED | OUTPATIENT
Start: 2023-11-20 | End: 2023-11-20 | Stop reason: HOSPADM

## 2023-11-20 RX ORDER — KETOROLAC TROMETHAMINE 30 MG/ML
INJECTION, SOLUTION INTRAMUSCULAR; INTRAVENOUS
Status: DISCONTINUED | OUTPATIENT
Start: 2023-11-20 | End: 2023-11-20

## 2023-11-20 RX ORDER — ONDANSETRON 4 MG/1
8 TABLET, ORALLY DISINTEGRATING ORAL EVERY 8 HOURS PRN
Status: DISCONTINUED | OUTPATIENT
Start: 2023-11-20 | End: 2023-11-20 | Stop reason: HOSPADM

## 2023-11-20 RX ORDER — MIDAZOLAM HYDROCHLORIDE 1 MG/ML
INJECTION INTRAMUSCULAR; INTRAVENOUS
Status: DISCONTINUED | OUTPATIENT
Start: 2023-11-20 | End: 2023-11-20

## 2023-11-20 RX ORDER — CELECOXIB 200 MG/1
200 CAPSULE ORAL 2 TIMES DAILY
Qty: 60 CAPSULE | Refills: 2 | Status: SHIPPED | OUTPATIENT
Start: 2023-11-20 | End: 2023-12-14

## 2023-11-20 RX ORDER — SODIUM CHLORIDE, SODIUM LACTATE, POTASSIUM CHLORIDE, CALCIUM CHLORIDE 600; 310; 30; 20 MG/100ML; MG/100ML; MG/100ML; MG/100ML
INJECTION, SOLUTION INTRAVENOUS CONTINUOUS
Status: DISCONTINUED | OUTPATIENT
Start: 2023-11-20 | End: 2023-11-20 | Stop reason: HOSPADM

## 2023-11-20 RX ORDER — HYDROMORPHONE HYDROCHLORIDE 2 MG/ML
INJECTION, SOLUTION INTRAMUSCULAR; INTRAVENOUS; SUBCUTANEOUS
Status: DISCONTINUED | OUTPATIENT
Start: 2023-11-20 | End: 2023-11-20

## 2023-11-20 RX ADMIN — DEXAMETHASONE SODIUM PHOSPHATE 8 MG: 4 INJECTION, SOLUTION INTRA-ARTICULAR; INTRALESIONAL; INTRAMUSCULAR; INTRAVENOUS; SOFT TISSUE at 10:11

## 2023-11-20 RX ADMIN — ROPIVACAINE HYDROCHLORIDE 30 ML: 7.5 INJECTION, SOLUTION EPIDURAL; PERINEURAL at 10:11

## 2023-11-20 RX ADMIN — TRANEXAMIC ACID 500 MG: 100 INJECTION, SOLUTION INTRAVENOUS at 08:11

## 2023-11-20 RX ADMIN — FENTANYL CITRATE 100 MCG: 50 INJECTION INTRAMUSCULAR; INTRAVENOUS at 07:11

## 2023-11-20 RX ADMIN — SODIUM CHLORIDE, POTASSIUM CHLORIDE, SODIUM LACTATE AND CALCIUM CHLORIDE: 600; 310; 30; 20 INJECTION, SOLUTION INTRAVENOUS at 07:11

## 2023-11-20 RX ADMIN — SODIUM CHLORIDE: 9 INJECTION, SOLUTION INTRAVENOUS at 06:11

## 2023-11-20 RX ADMIN — CEFAZOLIN 2 G: 1 INJECTION, POWDER, FOR SOLUTION INTRAMUSCULAR; INTRAVENOUS; PARENTERAL at 07:11

## 2023-11-20 RX ADMIN — PROPOFOL 150 MG: 10 INJECTION, EMULSION INTRAVENOUS at 07:11

## 2023-11-20 RX ADMIN — HYDROMORPHONE HYDROCHLORIDE 1 MG: 2 INJECTION, SOLUTION INTRAMUSCULAR; INTRAVENOUS; SUBCUTANEOUS at 08:11

## 2023-11-20 RX ADMIN — ONDANSETRON 4 MG: 2 INJECTION INTRAMUSCULAR; INTRAVENOUS at 07:11

## 2023-11-20 RX ADMIN — DEXAMETHASONE SODIUM PHOSPHATE 8 MG: 4 INJECTION, SOLUTION INTRA-ARTICULAR; INTRALESIONAL; INTRAMUSCULAR; INTRAVENOUS; SOFT TISSUE at 07:11

## 2023-11-20 RX ADMIN — LIDOCAINE HYDROCHLORIDE 80 MG: 20 INJECTION, SOLUTION INTRAVENOUS at 07:11

## 2023-11-20 RX ADMIN — MIDAZOLAM 2 MG: 1 INJECTION INTRAMUSCULAR; INTRAVENOUS at 07:11

## 2023-11-20 RX ADMIN — KETOROLAC TROMETHAMINE 60 MG: 30 INJECTION, SOLUTION INTRAMUSCULAR at 10:11

## 2023-11-20 NOTE — PLAN OF CARE
Problem: Physical Therapy  Goal: Physical Therapy Goal  Description: Short term goals    To ensure safe transition home:    Patient will demo safe transfers and gait with crutches NWB right LE  Patient will demo understanding of illustrated HEP and cryo-therapy management      Long term goals    Patient will resume all prior ADLs   Outcome: Met     Patient and family demonstrate competence with post-op care for d/c home today.

## 2023-11-20 NOTE — DISCHARGE INSTRUCTIONS
Ankle Pump        Bend ankles up and down, alternating feet.  Repeat 30 times. Do 3 sessions per day.         KNEE: Extension (Isometric)        Lie on back, legs straight. Tighten right quadriceps (front of thigh) by pushing back of knee into surface. Hold 5 to 10 seconds, relax. Repeat 30 times. Do 3 sessions per day.        HIP / KNEE: Flexion, Heel Slides - Supine        Slide right heel up toward buttocks, keeping leg in straight line, straighten leg fully. Repeat 30 times. Do 3 sessions per day.        Straight Leg Raise        Bend left leg. Raise right leg with knee locked. Exhale and tighten thigh muscles while raising leg. Lower your leg with control.  Repeat 30 times. Do 3 sessions per day.       Copyright © [employer]. All rights reserved.

## 2023-11-20 NOTE — ANESTHESIA PROCEDURE NOTES
Peripheral Block    Patient location during procedure: OR   Block not for primary anesthetic.  Reason for block: at surgeon's request and post-op pain management   Post-op Pain Location: rt ACL reconstruction   Start time: 11/20/2023 10:41 AM  Timeout: 11/20/2023 10:40 AM   End time: 11/20/2023 10:46 AM    Staffing  Authorizing Provider: Joe Castillo MD  Performing Provider: Joe Castillo MD    Staffing  Performed by: Joe Castillo MD  Authorized by: Joe Castillo MD    Preanesthetic Checklist  Completed: patient identified, IV checked, site marked, risks and benefits discussed, surgical consent, monitors and equipment checked, pre-op evaluation and timeout performed  Peripheral Block  Patient position: supine  Prep: ChloraPrep  Patient monitoring: heart rate, cardiac monitor, continuous pulse ox, continuous capnometry and frequent blood pressure checks  Block type: adductor canal  Laterality: right  Injection technique: single shot  Needle  Needle type: Tuohy   Needle gauge: 20 G  Needle length: 4 in  Needle localization: ultrasound guidance   -ultrasound image captured on disc.  Assessment  Injection assessment: negative aspiration, negative parasthesia and local visualized surrounding nerve  Paresthesia pain: none  Heart rate change: no  Slow fractionated injection: yes  Pain Tolerance: comfortable throughout block and no complaints  Medications:    Medications: ROPIvacaine (NAROPIN) injection 0.75% - Perineural   30 mL - 11/20/2023 10:43:00 AM

## 2023-11-20 NOTE — ANESTHESIA PROCEDURE NOTES
Intubation    Date/Time: 11/20/2023 7:29 AM    Performed by: Joseph Seaman CRNA  Authorized by: Joe Castillo MD    Intubation:     Induction:  Intravenous    Intubated:  Postinduction    Mask Ventilation:  Easy mask    Attempts:  1    Attempted By:  CRNA    Difficult Airway Encountered?: No      Complications:  None    Airway Device:  Supraglottic airway/LMA    Airway Device Size:  4.0    Style/Cuff Inflation:  Cuffed (inflated to minimal occlusive pressure)    Placement Verified By:  Capnometry    Complicating Factors:  None    Findings Post-Intubation:  BS equal bilateral and atraumatic/condition of teeth unchanged

## 2023-11-20 NOTE — TRANSFER OF CARE
"Anesthesia Transfer of Care Note    Patient: Cher Ramos    Procedure(s) Performed: Procedure(s) (LRB):  RECONSTRUCTION, KNEE, ACL, ARTHROSCOPIC (Right)  ARTHROSCOPY,KNEE,WITH MENISCUS REPAIR (Right)    Patient location: PACU    Anesthesia Type: general    Transport from OR: Transported from OR on room air with adequate spontaneous ventilation    Post pain: adequate analgesia    Post assessment: no apparent anesthetic complications and tolerated procedure well    Post vital signs: stable    Level of consciousness: responds to stimulation    Nausea/Vomiting: no nausea/vomiting    Complications: none    Transfer of care protocol was followedComments: Report Given to PACU rn VSS      Last vitals: Visit Vitals  /68 (BP Location: Right arm, Patient Position: Lying)   Pulse 105   Temp 36.6 °C (97.8 °F) (Oral)   Resp 16   Ht 5' 4" (1.626 m)   Wt 81.6 kg (180 lb)   LMP 10/25/2023 (Approximate)   SpO2 (!) 93%   Breastfeeding No   BMI 30.90 kg/m²     "

## 2023-11-20 NOTE — PT/OT/SLP EVAL
"Physical Therapy Evaluation and Discharge Note    Patient Name:  Cher Ramos   MRN:  [id number]    Recommendations:     Discharge Recommendations: Low Intensity Therapy (outpatient once ordered by Dr Vega)  Discharge Equipment Recommendations: crutches, axillary (issued and fit to patient)   Barriers to discharge: None    Assessment:     Cher Ramos is a 17 y.o. female admitted with a medical diagnosis of Rupture of anterior cruciate ligament of right knee. .  At this time, patient and family demonstrate competence with post-op care for d/c home today. Patient does not require further acute PT services.     Recent Surgery: Procedure(s) (LRB):  RECONSTRUCTION, KNEE, ACL, ARTHROSCOPIC (Right)  ARTHROSCOPY,KNEE,WITH MENISCUS REPAIR (Right) Day of Surgery    Plan:     During this hospitalization, patient does not require further acute PT services.  Please re-consult if situation changes.      Subjective     Chief Complaint: Rupture of anterior cruciate ligament of right knee ; meniscal tear  Patient/Family Comments/goals: "I can do it."  Pain/Comfort:  Pain Rating 1: 4/10  Location - Side 1: Right  Location 1: knee  Pain Addressed 1: Pre-medicate for activity, Reposition, Distraction, Cessation of Activity  Pain Rating Post-Intervention 1: 4/10    Patients cultural, spiritual, Nondenominational conflicts given the current situation: no    Living Environment:  Pt lives with her parents but boyfriend will assist. Single level home, no steps to manage  Prior to admission, patients level of function was independent; student in a culinary program.  Equipment used at home: none.  DME owned (not currently used): none.  Upon discharge, patient will have assistance from family.    Objective:     Communicated with Myra Perez RN prior to session.  Patient found supine with peripheral IV, blood pressure cuff, pulse ox (continuous) upon PT entry to room.    General Precautions: Standard, fall    Orthopedic Precautions:RLE " non weight bearing (x 30 days)   Braces: Hinged knee brace  Respiratory Status: Room air    Exams:  Cognitive Exam:  Patient is oriented to Person, Place, Time, and Situation  Sensation:    -       Intact  RLE ROM: Deficits: hip WFL ;knee flexion to 30; ankle WFL  RLE Strength: Deficits: hip WFL; no MMT knee; ankle WFL  LLE ROM: WFL  LLE Strength: WFL    Functional Mobility:  Bed Mobility:     Supine to Sit: minimum assistance  Sit to Supine: minimum assistance  Transfers:     Sit to Stand:  contact guard assistance with axillary crutches  Toilet Transfer: contact guard assistance with  axillary crutches  using  Step Transfer  Gait: 60' using axillary crutches SBA, NWB R LE in hinged knee brace; short controlled hops, no LOB or lightheadedness; good adherence to NWB restriction    AM-PAC 6 CLICK MOBILITY  Total Score:19       Treatment and Education:  Pt and family ed on illustrated HEP, brace management; dressing technique, cryocuff/ice use, NWB gait. Copy of Dr Vega protocol issued    AM-PAC 6 CLICK MOBILITY  Total Score:19     Patient left up in chair with all lines intact, call button in reach, Myra Perez RN notified, and significant other present.    GOALS:   Multidisciplinary Problems       Physical Therapy Goals       Not on file              Multidisciplinary Problems (Resolved)          Problem: Physical Therapy    Goal Priority Disciplines Outcome Goal Variances Interventions   Physical Therapy Goal   (Resolved)     PT, PT/OT Met     Description: Short term goals    To ensure safe transition home:    Patient will demo safe transfers and gait with crutches NWB right LE  Patient will demo understanding of illustrated HEP and cryo-therapy management      Long term goals    Patient will resume all prior ADLs                        History:     Past Medical History:   Diagnosis Date    Allergy     Eczema        Past Surgical History:   Procedure Laterality Date    TONSILLECTOMY      WISDOM TOOTH EXTRACTION  Bilateral        Time Tracking:     PT Received On: 11/20/23  PT Start Time: 1250     PT Stop Time: 1307  PT Total Time (min): 17 min     Billable Minutes: Evaluation Low complexity      11/20/2023

## 2023-11-20 NOTE — ANESTHESIA POSTPROCEDURE EVALUATION
Anesthesia Post Evaluation    Patient: Cher Ramos    Procedure(s) Performed: Procedure(s) (LRB):  RECONSTRUCTION, KNEE, ACL, ARTHROSCOPIC (Right)  ARTHROSCOPY,KNEE,WITH MENISCUS REPAIR (Right)    Final Anesthesia Type: general      Patient location during evaluation: PACU  Patient participation: Yes- Able to Participate  Level of consciousness: awake and sedated  Post-procedure vital signs: reviewed and stable  Pain management: adequate  Airway patency: patent    PONV status at discharge: No PONV  Anesthetic complications: no      Cardiovascular status: blood pressure returned to baseline  Respiratory status: unassisted  Hydration status: euvolemic  Follow-up not needed.          Vitals Value Taken Time   /88 11/20/23 1246   Temp 36.6 °C (97.8 °F) 11/20/23 1050   Pulse 99 11/20/23 1250   Resp 16 11/20/23 1245   SpO2 94 % 11/20/23 1250   Vitals shown include unvalidated device data.      Event Time   Out of Recovery 11:17:00         Pain/Aris Score: Aris Score: 10 (11/20/2023 12:55 PM)  Modified Aris Score: 19 (11/20/2023 12:55 PM)

## 2023-11-20 NOTE — PLAN OF CARE
NAME:_________________________________    DATE: _________________________________    PHYSICIAN:____________________________                                               ACL Reconstruction with Meniscal Repair                                                   Post-Operative Protocol  Complex meniscus repair recommend 4 weeks of nonweightbearing  Phase I - Maximum Protection (Weeks 0 to 4):    Weeks 0 to 2:  Ice and modalities to reduce pain and inflammation  Elevate the knee above the heart for 3 to 5 days  Use crutches NWB 30 days then 50% WB x1week to reduce swelling. The patient may when they can ambulate without a limp.  Brace locked in full extension for 4 to 6 weeks per physician orders  Initiate patella mobility drills  Begin passive/active knee range of motion to 90° of knee flexion and strong emphasis on full knee extension  Quadriceps setting focusing on VMO contraction  Multi-plane open kinetic chain straight leg raising  Gait training    Weeks 2 to 4:  Begin open and closed kinetic chain resisted cord multi-plane hip strengthening as acute inflammation resolves  Proprioception training  Manual PNF hip and ankle patterns  Begin pool program when incision sites healed    Phase II - Progressive Stretching and Early Strengthening (Week 4 to 6):    Weeks 4 to 6:  Gradually restore full range of motion with emphasis on extension/hyperextension  Continue with ice and modalities as needed  Normalize gait  Open brace to 0° to 90° per physician's orders  Initiate lower extremity stretching program  Begin stationary bike, treadmill, and/or elliptical  as strength and swelling allow  Begin closed kinetic chain strengthening progressing from bilateral to unilateral as tolerated  Implement reintegration exercises emphasizing core stability  Advance closed kinetic chain multi-plane hip strengthening  Proprioceptive drills emphasizing neuromuscular control    Phase III - Advanced Strengthening and  Proprioception Phase (Weeks 6 to 12):    Weeks 6 to 10:  Modalities as needed to control swelling  Wean out of brace weeks 6 to 8  Advance time and intensity on cardiovascular program - no running  Begin functional cord program    Weeks 10 to 12:  Initiate gym-strengthening program - Progressing form bilateral to unilateral  Leg press, squats, lunges, hamstring curls, ab/adduction, calf raises, and leg extensions (0° to 30°)  May begin outdoor biking and conservative hiking    Phase IV - Strengthening and Plyometric Phase (Weeks 12 to 20):    Weeks 12 to 20:  Implement a full gym-strengthening program  Begin pool running progressing to dry land as tolerated  Advance proprioception and begin plyometrics progressing from bilateral to unilateral as tolerated    Phase V - Return to Sport Functional Program (Week  to 24):    Follow-up examination with physician  Implement sport specific multi-directional drills  Implement interval functional program per physician approval  Continue with aggressive lower extremity stretching, strengthening, and cardiovascular training  Advance plyometric program as tolerated  Sports test for return to play                                                                            Return to Activity      Brace: Wear brace at 0°/0° for 4-6 weeks unless modified with physician approval  Crutches: 7-14 days, wean off as swelling, pain, strength, and function permit  Drivin-2 weeks when FWB and off of pain medications  Showering: Immediately covered, 3-5 days uncovered & not soaking                  Stationary Bike: At 2-4 weeks post-operatively as range of motion and swelling permit    Walking Program: With brace on, beginning when swelling and normal gait permit         ~ 2 weeks    Pool Program: Deep water jogging/cycling ~ 2-3 weeks post-operatively when wounds are healed, and shallow water walking at ~ 6 weeks    Elliptical Trainer: At approximately 6 weeks    Outdoor Biking: No  incline/decline in easy gears at 8-10 weeks post-operative.  Progress as tolerated adding incline and decline, and increasing intensity.    Hiking: Easy groomed trails at 8-10 weeks    Swimming: Free-style at 8 weeks, breast stroke and flip turns at 4 months post-operative    Jogging: Initiate jogging in pool at 10-12 weeks, progress to dry land at 12-14 weeks in a straight plane and minimal incline/decline, progressing as tolerated    Sprinting: Begin at 14-16 weeks post-operatively    Downhill Skiing: May begin at weeks 20-24    Golf: Begin with putting at 12 weeks, chipping at 16 weeks, then progress to interval golf program at 20 weeks with sports brace    Lateral Agilities: Begin at 4.5 months post-operatively, progressing over the next 6 weeks. Use sports brace for on-field work    Progressive sports specific drills: Begin at 5 months post-operatively with brace on, progressing over the next 4 weeks. For example:  Football pass patterns  Shooting basketball  Soccer dribbling  Karate forms      Return to full contact/impact/rotation sport: At 6 months with sports brace, sports test, and physician approval.

## 2023-11-20 NOTE — H&P
H&P completed  has been reviewed, the patient has been examined and:  I concur with the findings and no changes have occurred since H&P was written.    There are no hospital problems to display for this patient.            Pt is here for MRI follow up and treatment moving forward.     CC:  Knee pain     17 y.o. Female returns to clinic for a follow up visit regarding knee pain.                  Past Medical History:   Diagnosis Date    Allergy      Eczema              Past Surgical History:   Procedure Laterality Date    WISDOM TOOTH EXTRACTION Bilateral              PHYSICAL EXAMINATION:  LMP 2023   General     Nursing note and vitals reviewed.  HENT:   Nose: Nose normal.   Pulmonary/Chest: No respiratory distress. She exhibits no tenderness.   Abdominal: Soft. She exhibits no distension. There is no abdominal tenderness.   Psychiatric: Thought content normal.               Right Knee Exam      Tenderness   The patient is tender to palpation of the lateral joint line.     Range of Motion   Extension:  abnormal   Flexion:  abnormal      Tests   Meniscus   Nadeem:  Medial - positive   Ligament Examination   Lachman: abnormal   MCL - Valgus: normal (0 to 2mm)  Pivot Shift: abnormal     Muscle Strength   Right Lower Extremity   Quadriceps:  4/5   Hamstrin/5            IMAGING:  MRI Knee Without Contrast Right     Result Date: 2023  EXAMINATION: MRI KNEE WITHOUT CONTRAST RIGHT CLINICAL HISTORY: Pain in right kneeRIGHT KNEE PAIN; COMPARISON: None TECHNIQUE: Magnetic resonance imaging of the right knee was performed without the use of intravenous contrast. FINDINGS: Medial meniscus: There is a complex bucket-handle tear involving the medial meniscus with flap extending from anterior to posterior at the level of the tibial spine. Lateral meniscus: The lateral meniscus is grossly intact. Anterior cruciate ligament: Suspect moderate to high-grade tearing of the proximal ACL near the femoral insertion.   Correlate with anterior drawer. Posterior cruciate ligament: The posterior cruciate ligament is grossly intact. Medial collateral ligament: The medial collateral ligament is grossly intact. Lateral collateral ligament: The lateral collateral ligament is grossly  intact. Popliteus tendon: The popliteus tendon is grossly  intact. Extensor mechanism: The patellar tendon and the visualized portion of the quadriceps tendon are grossly intact. Osseous structures and cartilage: Mild patchy marrow edema involving the lateral aspect of the lateral femoral condyle as well as the dorsal aspects of the medial and lateral tibial plateau. Joint fluid: There is no significant knee joint effusion.  There is no significant Baker's cyst.      There is a complex bucket-handle tear involving the medial meniscus with flap extending from anterior to posterior at the level of the tibial spine. Suspect moderate to high-grade tearing of the proximal ACL near the femoral insertion. Correlate with anterior drawer. Mild patchy marrow edema involving the lateral aspect of the lateral femoral condyle as well as the dorsal aspects of the medial and lateral tibial plateau. Point of Service: [facility] Electronically signed by:       Tony Hammer Date:                                         08/31/2023 Time:                                            15:56        ASSESSMENT:        ICD-10-CM ICD-9-CM   1. Rupture of anterior cruciate ligament of right knee, initial encounter  S83.511A 844.2   2. Acute medial meniscus tear of right knee, initial encounter  S83.241A 836.0         PLAN:     -Findings and treatment options were discussed with the patient  -All questions answered  Natural history and expected course discussed. Questions answered.  Educational materials distributed.     The spectrum of treatment options were discussed with the patient, including nonoperative and operative options.  We have discussed the surgery and recovery  of arthroscopic knee surgery. she understands that there may be limited weightbearing up to several weeks after surgery depending on procedures that are performed at the time of surgery.     After thorough discussion, the patient has elected to undergo surgical treatment to include:  right              A. Knee arthroscopic rt Anterior Cruciate Ligament reconstruction with quad tendon autograft               B. Knee arthroscopic rt possible meniscus repair        Informed consent for the procedure was obtained . The details of the procedure along with the expected postop rehab and recovery course were reviewed. Alternatives both operative and non-operative with associated risks and benefits discussed. The outlined risks and potential complications of the proposed procedure include but are not limited to: infection, poor wound healing, scarring, stiffness, swelling, continued or recurrent pain, instability, hardware or prosthetic failure, symptomatic hardware requiring removal, weakness, neurovascular injury, numbness, chronic regional pain disorder, tissue nonhealing/irreparability/retear, contralateral ligament injury, chondral injury, arthritis, fracture, blood clot formation, inability to return to previous level of activity, anesthetic or regional block complication up to death, need for additional procedure as indicated, and potential need for further surgery.      she was also informed and understands the risks of surgery are greater for patients with a current condition or history of heart disease, obesity, clotting disorders, recurrent infections, steroid use, current or past smoking, and factors such as sedentary lifestyle and noncompliance with medications, therapy or follow-up. The degree of the increased risk is hard to estimate with any degree of precision.     All questions were answered. The patient has verbalized understanding of these issues and wishes to proceed as discussed.         There are no  Patient Instructions on file for this visit.        No orders of the defined types were placed in this encounter.

## 2023-11-20 NOTE — ANESTHESIA PREPROCEDURE EVALUATION
11/20/2023  Cher Ramos is a 17 y.o., female.      Pre-op Assessment    I have reviewed the Patient Summary Reports.     I have reviewed the Nursing Notes. I have reviewed the NPO Status.   I have reviewed the Medications.     Review of Systems  Anesthesia Hx:  No problems with previous Anesthesia                Social:  No Alcohol Use, Smoker       Hematology/Oncology:  Hematology Normal   Oncology Normal                                   EENT/Dental:  EENT/Dental Normal           Cardiovascular:  Cardiovascular Normal                                            Pulmonary:  Pulmonary Normal                       Renal/:  Renal/ Normal                 Hepatic/GI:  Hepatic/GI Normal                 Musculoskeletal:  Musculoskeletal Normal                Neurological:  Neurology Normal                                      Endocrine:  Endocrine Normal            Dermatological:  Skin Normal    Psych:  Psychiatric Normal                    Physical Exam  General: Well nourished    Airway:  Mallampati: II / II  Mouth Opening: Normal  TM Distance: > 6 cm  Tongue: Normal  Neck ROM: Normal ROM    Chest/Lungs:  Clear to auscultation, Normal Respiratory Rate    Heart:  Rate: Normal  Rhythm: Regular Rhythm        Anesthesia Plan  Type of Anesthesia, risks & benefits discussed:    Anesthesia Type: Gen Supraglottic Airway, Regional  Intra-op Monitoring Plan: Standard ASA Monitors  Post Op Pain Control Plan: multimodal analgesia  Induction:  IV  Informed Consent: Informed consent signed with the Patient and all parties understand the risks and agree with anesthesia plan.  All questions answered. Patient consented to blood products? Yes  ASA Score: 2  Day of Surgery Review of History & Physical: H&P Update referred to the surgeon/provider.I have interviewed and examined the patient. I have reviewed the patient's  H&P dated: There are no significant changes.     Ready For Surgery From Anesthesia Perspective.     .

## 2023-11-20 NOTE — OP NOTE
Surgery Date: 11/20/2023      Surgeon(s) and Role:     * Geovany Vega MD - Primary    Assistant: Roberto Park    Pre-op Diagnosis:   Rupture of anterior cruciate ligament of right knee, initial encounter [S83.511A]     Post-op Diagnosis:  Post-Op Diagnosis Codes:     * Rupture of anterior cruciate ligament of right knee, initial encounter [S83.511A]   Bucket-handle meniscus tear of right knee  Lateral meniscus root tear right knee  Medial shelf plica right knee    Procedure:  Procedure(s) (LRB):  RECONSTRUCTION, KNEE, ACL, ARTHROSCOPIC (Right)  ARTHROSCOPY,KNEE,WITH MENISCUS REPAIR (Right) Medial meniscus bucket handle repair  Arthroscopic right knee medial shelf plica excision   Right knee arthroscopic lateral meniscectomy      Anesthesia:  General + adductor canal block    EBL:  5cc    Implants:   Implant Name Type Inv. Item Serial No.  Lot No. LRB No. Used Action   SYS AUTOGRAFT GRAFTLINK CP - [id number]  SYS AUTOGRAFT GRAFTLINK CP  [employer]  Right 1 Implanted   DEVICE FIX TIGHTROPE FIBERTAG - [id number]  DEVICE FIX TIGHTROPE FIBERTAG  [employer]  Right 1 Implanted   FIBERTAG TIGHTROPE II FOR IMPLANT FOR THE INTERNALBRACE TECHNIQUE      Right 1 Implanted   WIRE SET PASSING GRAFT - [id number]  WIRE SET PASSING GRAFT  [employer]  Right 1 Implanted   FAST- CURVED NEEDLE DELIVERY SYSTEM      Right 1 Implanted   FAST FIX FLEX CURVED , MARIE, CANNULA SET      Right 1 Implanted   IMP SEC FIX PEEK SWIVEL LOCK - [id number]  IMP SEC FIX PEEK SWIVEL LOCK  [employer]  Right 1 Implanted   FAST-FIX CURVED , MARIE, CANNULA SET      Right 1 Implanted       Tourniquet time: 1 hr 55 mins    Complication:   none      DISPOSITION: Extubated and taken to the recovery room in stable condition with less than 2 second capillary refills in all digits and compartments were soft.     INDICATIONS:  The patient is a 17 y.o. year-old who had a history and physical examination consistent with the aforementioned  diagnoses.  The risks and benefits were discussed with the patient.  The patient acknowledged an understanding and wished to proceed with operative intervention.  Informed consent was obtained prior to the procedure.  Details of the surgical procedure were explained including incisions, probable rehabilitation course and description of the hardware and graft to be used was given.  The patient understands the likely length of convalescence after surgery and we reasonably explained the risks, benefits and alternatives to surgery.  The reasonable expectations and potential complications were discussed and acknowledged including, but not limited to, infection, bleeding, blood clots, nerve injury, retear, instability and continued pain and stiffness.  It was also explained that there was a chance of failure, which may require further management.  The patient agreed, understood and wished to proceed.      EXAMINATION UNDER ANESTHESIA of the OPERATIVE KNEE:    Range of motion from to 0 to 140 degrees, a grade 2 Lachman's test and grade 2 pivot shift test.  The patient was stable to varus and valgus stress at 0 and 30 degrees of flexion.  There was a negative effusion.      EXAMINATION UNDER ANESTHESIA of the NONOPERATIVE KNEE:    Range of motion from 0 to 140 degrees, stable to varus and valgus stress at 0 and 30 degrees of flexion, a negative Lachman's test, negative pivot shift and a negative effusion.       DESCRIPTION OF OPERATION: After the correct operative site was marked by the operating surgeon and the correct consents were signed, the patient was brought to the Operating Room and placed in the supine position where general anesthetic was administered by the Anesthesia Team.  All pressure points were carefully padded and checked.  The operative lower extremity had a well padded tourniquet. The operative leg was prepped and draped free in the usual sterile fashion.  After prepping and draping, the appropriate  landmarks were noted on the skin. A surgical pause was performed and the patient received prophylactic antibiotics. The knee was exsanguinated and the tourniquet was inflated to 250 mmHg.     The inferolateral followed by the inferomedial portals were created and systemic examination of the joint revealed the following:     PF  negative compartmentalization or adhesions in the suprapatellar pouch.   Trochlear chondromalacia:none  Patella chondromalacia: none    Medial  Medial femoral chondyle chondromalacia : grade I  Medial tibial plateau chondromalacia none  +  for meniscus tear.  This was a bucket-handle meniscus tear which extended from the posterior horn to the junction of the anterior and posterior horns and was displaced within the notch.  This required reduction of the meniscus tissues and I utilized a shaver to debride the posterior surfaces of the bucket-handle tear and the peripheral edge.  I then created an accessory medial portal and used a probe to reduce this and then turned our attention towards repair.  I utilized Fast Fix 360 devices in placed 2 on the superior surface on the posterior horn and 1 on the inferior surface.  An additional 2 more Fast Fix 360 devices were utilized but these were the flexible all inside anchors used to repair the meniscus at the junction between the anterior and posterior horns.  This served to reduce the bucket-handle meniscus quite anatomically and solidly.  This completed the bucket-handle meniscus repair.  Likewise performed a chondroplasty on the medial femoral condyle on the areas of grade 1 to grade 2 wear    Lateral  Lateral femoral condyle chondromalacia: none  Lateral tibial plateau chondromalacia: grade I  +  for meniscus tear.  This was a partial tear of the lateral meniscus root this required partial debridement this area also debrided the lateral tibial condyle grade 1 chondromalacia      Examination of the intercondylar notch revealed a complete tear of  the ACL. The PCL was probed and found to be intact.   Very large and focal medial shelf plica was seen there was impinging upon medial femoral condyle there were subtle grade 1 chondromalacia seen in this area so we debrided the thickened medial shelf plica this completed the plica band excision    Attention was turned to quadriceps tendon harvesting.  I utilized a 4 cm incision just proximal to the patella tendon incised through skin subcutaneous tissue.  9 mm parallel blade was used to incise the quadriceps tendon and a full-thickness graft was taken of 60 mm in length.  It was then taken to the back table and prepared  The graft measured 10 mm in diameter on the tibial side and on the femoral side this is slightly tapered down to an 8.5 mm graft..The graft was then place on 20 N of tension and covered with moist gauze.  The quadriceps tendon was then repaired with 2. FiberWire stitch in interrupted fashion                                                                The anatomic footprint of the ACL was noted both on the lateral femoral condyle as well as the tibial plateau.  After marking the center of both, we turned our attention to drilling a femoral tunnel.  The [employer] FlipCutter guide was introduced through the inferolateral portal and centered in the anatomic footprint of the ACL.  We then made a 1 cm incision over the lateral aspect of the thigh and the FlipCutter guide was introduced down to bone.  We then introduced the FlipCutter through the guide.  We drilled the FlipCutter guide to the center of the ACL footprint.  We then flipped it into a reamed position and reamed a tunnel.    Our attention was then turned to the tibial tunnel.  After centering the guide in center of the anatomic footprint, the FlipCutter was introduced into the previously made medial tibia incision.  The guide pin was drilled through the center of the tibial footprint and flipped into a reamed position.  The tibial tunnel was  then reamed.     After reaming the tibia, the graft was passed in the standard fashion.  We ensured the button flipped over the lateral cortex of the femur. The graft was drawn into the femoral tunnel stopping 2 mm from the end of the femoral tunnel. We then brought the graft into the tibial tunnel. There was adequate tibial tunnel length and the graft did not bottom out. The knee was brought into full extension. There was no evidence of impingement.     We then secured the graft on the tibial side with a ABS button. We then secured the internal brace, which was secured through the tight rope button over the lateral cortex of the femur. We did this in full extension with a 4.75mm swivel lock distal to the tibial tunnel. The tibial portion of the graft was then tensioned prior to cycling the knee 40 times. The leg was brought into full extension. With a posterior drawer force in place, the graft was re-tensioned on the tibial side and then the femoral side.  The arthroscope was introduced into the knee.  The graft was probed and found to be taut.     The sutures from the graftlink and the tibial tightrope sutures were secured in the tibia with a 4.75mm swivel lock anchor. The femoral tightrope sutures were tied down to the button.     After the completion of this, the patient had a negative Lachman's test and a negative pivot shift test.    The wounds were copiously irrigated and closed in layers with #2-0 vicryl and #3-0 monocryl.  Portals were closed with 3'0 nylon.  A sterile dressing and hinged knee brace were placed.  The patient was allowed to recover from anesthesia, extubated and taken to the Recovery Room in stable condition with less than 1 second capillary refill in all digits and soft compartments.

## 2023-11-21 DIAGNOSIS — Z98.890 S/P ACL REPAIR: Primary | ICD-10-CM

## 2023-11-22 ENCOUNTER — CLINICAL SUPPORT (OUTPATIENT)
Dept: REHABILITATION | Facility: HOSPITAL | Age: 18
End: 2023-11-22
Payer: MEDICAID

## 2023-11-22 DIAGNOSIS — M25.661 STIFFNESS OF RIGHT KNEE: ICD-10-CM

## 2023-11-22 DIAGNOSIS — R53.1 WEAKNESS: ICD-10-CM

## 2023-11-22 DIAGNOSIS — R26.2 DIFFICULTY WALKING: ICD-10-CM

## 2023-11-22 DIAGNOSIS — M25.561 ACUTE PAIN OF RIGHT KNEE: Primary | ICD-10-CM

## 2023-11-22 PROCEDURE — 97161 PT EVAL LOW COMPLEX 20 MIN: CPT

## 2023-11-22 NOTE — PLAN OF CARE
[facility]   Physical Therapy Initial Evaluation      Name: Cher Ramos  [facility] Number: [id number]    Therapy Diagnosis: No diagnosis found.     Physician: Geovany Vega MD    Physician Orders: PT Eval and Treat  Medical Diagnosis from Referral: s/p R ACL-R w/ QT autograft and lateral meniscus repair  Evaluation Date: 11/22/2023  Authorization Period Expiration: 11/20/24  Plan of Care Expiration: 2/02/24  Progress Note Due: 12/22/23  Visit # / Visits authorized: 1/ Pending   FOTO: 56 at IE    Precautions: 4 weeks NWB    Time In: 03:30PM  Time Out: 04:20PM  Total Appointment Time (timed & untimed codes): 50 minutes    Subjective     Date of onset: 11/19/23    History of current condition - Cher reports to physical therapy s/p R ACL-r w/ QT autograft and lateral meniscus repair.  Initial injury occurred when she playing kickball about a year ago and fell into a hole.  Tried conservative tx here but unsuccessful.  Ultimately had surgery with Dr. Geovany Vega here at [facility].     Now 2 days post-op and reports that her knee is still pretty tender and throbbing frequently.  Has been compliant with NWB status so far.  Sleeping poorly due to the knee really throbbing at night.  Still taking pain medicine at this point as prescribed.      Lives with mom and step dad in single story home with a single step threshold into the house.  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      Falls: None    Imaging: Recent MRI showing extent of internal derangement    Prior Therapy: Attended therapy here for conservative tx  Social History: Lives with mother and step dad  Occupation: Culinary student  Prior Level of Function: No limitations  Current Level of Function: Unable to WB on R LE    Pain:  Current 3/10, worst 8/10, best 1/10   Location: Diffuse R knee more superior knee at quad insertion  Description: Aching, Dull, and Throbbing  Aggravating Factors: Sitting, Standing, and Night Time  Easing Factors: pain  medication, ice, rest, and elevation    Patients goals: to be back walking without issue     Medical History:   Past Medical History:   Diagnosis Date    Allergy     Eczema        Surgical History:   Cher Ramos  has a past surgical history that includes [place] tooth extraction (Bilateral); Tonsillectomy; Knee arthroscopy w/ ACL reconstruction (Right, 11/20/2023); and arthroscopy,knee,with meniscus repair (Right, 11/20/2023).    Medications:   Cher has a current medication list which includes the following prescription(s): celecoxib, cetirizine, ondansetron, oxycodone-acetaminophen, and triamcinolone acetonide 0.1%.    Allergies:   Review of patient's allergies indicates:  No Known Allergies     Objective      Observation : Arrived NWB on R LE w/ bilateral axillary crutches still w/ post op ace wrapping on with brace in place.  Incision looking good with minimal drainage.      Range of Motion/Strength :        Left Extremity                                                                        Right Extremity   AROM Strength  Location  AROM   Strength   145  5/5   Knee    Flexion (140)  45  2-/5    +5  5/5                Extension (0)  -15  2-/5     Knee Special Tests :   Not indicated due to post-op    Functional Impairments :    Unable to amb I, unable to negotiate stairs, unable to perform bodyweight squat, unable to perform functional ADL at this time.     Limitation/Restriction for FOTO Knee Survey    Therapist reviewed FOTO scores for Cher Ramos on 11/22/2023.   FOTO documents entered into [facility] - see Media section.    Intake Score: 56%         Treatment     Total Treatment time (time-based codes) separate from Evaluation: 0 billed minutes (20 min NB)    Cher received the treatments listed below:  THERAPEUTIC EXERCISES to develop strength, endurance, ROM, and flexibility for 0 billed minutes including QS w/ SLR x 4 min, HS w/ Ankle Pump x 4 min  SUPERVISED MODALITIES after being cleared  for contraindications:  NMES Electrical Stimulation:  Cher received NMES Electrical Stimulation to elicit muscle contraction of the R quad. Pt received stimulation at a rate of 50 pps with symmetric current, ramp of 2 seconds with 10 second on time and 20 second off time. Performed for 12 min w/ 4 min each of QS, SLR, SAQ. Patient tolerated treatment well without any adverse effects.       Patient Education and Home Exercises     Education provided:   - Diagnosis, prognosis, plan of care forward    Written Home Exercises Provided: yes. Exercises were reviewed and Cher was able to demonstrate them prior to the end of the session.  Cher demonstrated good  understanding of the education provided. See EMR under Patient Instructions for exercises provided during therapy sessions.    Assessment     Cher is a 17 y.o. female referred to outpatient Physical Therapy s/p ACL-R on R LE. Patient presents with decreased R knee ROM, strength, flexibility, endurance, functional capacity at this time secondary to recent surgery which we will work to address through skilled therapy at this time.     Patient prognosis is Good.   Patient will benefit from skilled outpatient Physical Therapy to address the deficits stated above and in the chart below, provide patient /family education, and to maximize patientt's level of independence.     Plan of care discussed with patient: Yes  Patient's spiritual, cultural and educational needs considered and patient is agreeable to the plan of care and goals as stated below:     Anticipated Barriers for therapy: none    Medical Necessity is demonstrated by the following  History  Co-morbidities and personal factors that may impact the plan of care [x] LOW: no personal factors / co-morbidities  [] MODERATE: 1-2 personal factors / co-morbidities  [] HIGH: 3+ personal factors / co-morbidities    Moderate / High Support Documentation:   Co-morbidities affecting plan of care:  "n/a    Personal Factors:   no deficits     Examination  Body Structures and Functions, activity limitations and participation restrictions that may impact the plan of care [] LOW: addressing 1-2 elements  [x] MODERATE: 3+ elements  [] HIGH: 4+ elements (please support below)    Moderate / High Support Documentation: decreased R knee ROM, strength, flexibility, endurance, functional capacity     Clinical Presentation [x] LOW: stable  [] MODERATE: Evolving  [] HIGH: Unstable     Decision Making/ Complexity Score: low       Goals:  Short Term Goals: 6 weeks   Pt will be independent with HEP for continued progression beyond skilled therapy.  Pt will be able to ambulate I without deviation or quad avoidance gait on R.  Pt will be able to negotiate stairs reciprocally without pain or deviation on R.  Pt will be able to perform an SLR on R without lag to demonstrate improved terminal knee extension control.     Long Term Goals: 12 weeks   Pt will be able to perform a lateral step down from an 8" step without pain or limitation from R knee.  Pt will be able to return to running activities without pain or limitation from R knee.  Pt will be independent with strengthening activities in the gym on her own for safe discharge.   Pt will be able to perform a prone heel to butt without increased pain in R knee.  Pt will be able to perform a bodyweight squat without pain or limitation from R knee.  Plan     Plan of care Certification: 11/22/2023 to 02/02/24.    Outpatient Physical Therapy 2 times weekly for 10 weeks to include the following interventions: Electrical Stimulation Unattended, Manual Therapy, Neuromuscular Re-ed, Patient Education, Therapeutic Activities, and Therapeutic Exercise.       Medicaid requirements:  Plan of Care Dates:  CPT codes and number of units needed per visit:  68355 : 4 Units/week x 10 weeks =40 units  00484 : 2 Units/week x 10 weeks = 20 units  71820 : 2 Units/week x 10 weeks = 20 units   92421 : 4 " Units/week x 10 weeks = 40 units   50443: 2 Units/week x 5 weeks followed by 4 Units/week x 5 weeks = 30 units  Last face to face visit with MD: 11/20/23  Home exercise program and patient compliance: Home Exercise Program initiated at time of Evaluation. This will be upgraded during upcoming treatment sessions. Will educate patient on proper form and compliance. Patient will be Independent and compliant with the Home Exercise Program.  Discharge Plan: Will work toward completion of all goals set and patient will be Independent and compliant with the Home Exercise Program.         Physician Signature:_________________________________________________________  Date: ____________________   Allan Sinha PT

## 2023-11-27 ENCOUNTER — OFFICE VISIT (OUTPATIENT)
Dept: ORTHOPEDICS | Facility: CLINIC | Age: 18
End: 2023-11-27
Payer: MEDICAID

## 2023-11-27 VITALS — HEIGHT: 64 IN | BODY MASS INDEX: 30.73 KG/M2 | WEIGHT: 180 LBS

## 2023-11-27 DIAGNOSIS — Z98.890 S/P ACL REPAIR: Primary | ICD-10-CM

## 2023-11-27 PROCEDURE — 1159F MED LIST DOCD IN RCRD: CPT | Mod: CPTII,,, | Performed by: NURSE PRACTITIONER

## 2023-11-27 PROCEDURE — 99024 PR POST-OP FOLLOW-UP VISIT: ICD-10-PCS | Mod: ,,, | Performed by: NURSE PRACTITIONER

## 2023-11-27 PROCEDURE — 1160F RVW MEDS BY RX/DR IN RCRD: CPT | Mod: CPTII,,, | Performed by: NURSE PRACTITIONER

## 2023-11-27 PROCEDURE — 99024 POSTOP FOLLOW-UP VISIT: CPT | Mod: ,,, | Performed by: NURSE PRACTITIONER

## 2023-11-27 PROCEDURE — 1160F PR REVIEW ALL MEDS BY PRESCRIBER/CLIN PHARMACIST DOCUMENTED: ICD-10-PCS | Mod: CPTII,,, | Performed by: NURSE PRACTITIONER

## 2023-11-27 PROCEDURE — 99213 OFFICE O/P EST LOW 20 MIN: CPT | Mod: PBBFAC | Performed by: NURSE PRACTITIONER

## 2023-11-27 PROCEDURE — 1159F PR MEDICATION LIST DOCUMENTED IN MEDICAL RECORD: ICD-10-PCS | Mod: CPTII,,, | Performed by: NURSE PRACTITIONER

## 2023-11-27 NOTE — DISCHARGE SUMMARY
[facility] - [facility]opedic [facility]  Discharge Note  Short Stay    Procedure(s) (LRB):  RECONSTRUCTION, KNEE, ACL, ARTHROSCOPIC (Right)  ARTHROSCOPY,KNEE,WITH MENISCUS REPAIR (Right)      OUTCOME: Patient tolerated treatment/procedure well without complication and is now ready for discharge.    DISPOSITION: Home or Self Care    FINAL DIAGNOSIS:  Rupture of anterior cruciate ligament of right knee    FOLLOWUP: In clinic    DISCHARGE INSTRUCTIONS:    Discharge Procedure Orders   Diet general     Remove dressing in 72 hours   Order Comments: Remove dressing in 3 days.  Place band-aids over portal sites.     Call MD for:  temperature >100.4     Call MD for:  persistent nausea and vomiting     Call MD for:  severe uncontrolled pain     Call MD for:  difficulty breathing, headache or visual disturbances     Call MD for:  redness, tenderness, or signs of infection (pain, swelling, redness, odor or green/yellow discharge around incision site)     Call MD for:  hives     Call MD for:  persistent dizziness or light-headedness     Call MD for:  extreme fatigue     Weight bearing restrictions (specify)   Order Comments: Please refer to op note for therapy plan         Clinical Reference Documents Added to Patient Instructions         Document    ANTERIOR CRUCIATE LIGAMENT TEAR DISCHARGE INSTRUCTIONS (ENGLISH)    [facility] WORK EXCUSE - ACCOMPANY A PATIENT            TIME SPENT ON DISCHARGE: 9 minutes

## 2023-11-27 NOTE — PATIENT INSTRUCTIONS
Safety guidelines and activity restrictions are discussed with the patient.  She was to continue the hinged knee brace until told otherwise.  She was to remain nonweightbearing right lower extremity at this time.  Sutures removed Steri-Strips applied.  Wound care provided.  Instructed patient to keep hinges of the locked out brace even with her knee.  Verbalizes understanding.  She was given a copy of the ACL protocol.  We will have her return to orthopedic clinic with Dr. Geovany Vega in 5 weeks or sooner as needed.

## 2023-11-27 NOTE — PROGRESS NOTES
17-year-old female presents ambulatory to orthopedic clinic nonweightbearing of the right lower extremity.  She is known to orthopedic having undergone ACL reconstruction on 11/20/2023.  She reports improvement pain symptoms.  Does not require further pain medications time.  She states that she is been to physical therapy already.  States therapist told her she would return to see him every Tuesday.    PE:  Physical exam she is noted be ambulating nonweightbearing on right lower extremity with crutches she was noted have a locked out brace on.  It is not fitted properly at this time.  She does state that it slides down her leg often when she ambulates.  Physical exam of her right knee surgical sites healing well without sign or symptom of infection.  There is some soft tissue swelling noted this be expected.  No intra-articular effusion appreciated clinically.      Impression:  Status post ACL reconstruction-right     Plan:  Safety guidelines and activity restrictions are discussed with the patient.  She was to continue the hinged knee brace until told otherwise.  She was to remain nonweightbearing right lower extremity at this time.  Sutures removed Steri-Strips applied.  Wound care provided.  Instructed patient to keep hinges of the locked out brace even with her knee.  Verbalizes understanding.  She was given a copy of the ACL protocol.  We will have her return to orthopedic clinic with Dr. Geovany Vega in 5 weeks or sooner as needed.

## 2023-11-28 ENCOUNTER — CLINICAL SUPPORT (OUTPATIENT)
Dept: REHABILITATION | Facility: HOSPITAL | Age: 18
End: 2023-11-28
Payer: MEDICAID

## 2023-11-28 DIAGNOSIS — R53.1 WEAKNESS: ICD-10-CM

## 2023-11-28 DIAGNOSIS — M25.661 STIFFNESS OF RIGHT KNEE: ICD-10-CM

## 2023-11-28 DIAGNOSIS — M25.561 ACUTE PAIN OF RIGHT KNEE: Primary | ICD-10-CM

## 2023-11-28 DIAGNOSIS — R26.2 DIFFICULTY WALKING: ICD-10-CM

## 2023-11-28 PROCEDURE — 97014 ELECTRIC STIMULATION THERAPY: CPT

## 2023-11-28 PROCEDURE — 97112 NEUROMUSCULAR REEDUCATION: CPT

## 2023-11-28 PROCEDURE — 97140 MANUAL THERAPY 1/> REGIONS: CPT

## 2023-11-28 NOTE — PROGRESS NOTES
Physical Therapy Treatment Note     Name: Cher GARCIA [facility] Number: [id number]    Therapy Diagnosis: No diagnosis found.  Physician: Geovany Vega MD    Visit Date: 11/28/2023    Physician Orders: PT Eval and Treat  Medical Diagnosis from Referral: s/p R ACL-R w/ QT autograft and lateral meniscus repair  Evaluation Date: 11/22/2023  Authorization Period Expiration: 11/20/24  Plan of Care Expiration: 2/02/24  Progress Note Due: 12/22/23  Visit # / Visits authorized: 1/ Pending   FOTO: 56 at IE  PTA Visit #: 0/5    Time In: 02:08PM  Time Out: 02:52PM  Total Billable Time: 44 minutes    Precautions: Standard NWB until 12/18/23  Functional Level Prior to Evaluation: Chronic limitation from injury prior to surgery    Subjective     Pt reports: knee feeling stiff and still intermittently painful.  Intermittently compliant with HEP.  She was compliant with home exercise program.  Response to previous treatment: first visit since eval  Functional change: Still NWB    Pain: 3/10  Location: Diffuse R knee     Objective     Resting extension of -16 deg on arrival with a -7 deg lag with SLR to -23 deg.  65 deg flexion.     Treatment     Cher received therapeutic exercises to develop strength, endurance, ROM, flexibility, and posture for 12 minutes including:  Supine ball flexion rolls [id number]  Calf Dorsiflexion Stretch [id number]  Prone Quad Sets [id number]  Sidelying Hip Abduction x20 each side    Cher received the following manual therapy techniques: Joint mobilizations, Myofacial release, and Soft tissue Mobilization were applied to the: R knee for 8 minutes, including:  Flex/Ext on EOB w/ medial/superior patellar mobs    Cher participated in neuromuscular re-education activities to improve: Balance, Coordination, Kinesthetic, Sense, Proprioception, and Posture for 8 minutes. The following activities were included:  Long Bridging [id number]  Pikes w/ ball Pass 3x5 each alt UE/LE    Cher received the  following supervised modalities after being cleared for contradictions: NMES Electrical Stimulation:  Cher received NMES Electrical Stimulation to elicit muscle contraction of the R Quad. Pt received stimulation at a rate of 50 pps with symmetric current, ramp of 2 seconds with 10 second on time and 20 second off time. Performed for 16 min w/ 8 min in propped extension w/ MHP followed by 8 min SAQ.  Patient tolerated treatment well without any adverse effects.     Home Exercises Provided and Patient Education Provided     Education provided: Review of HEP and need to get knee fully extended    Written Home Exercises Provided: Patient instructed to cont prior HEP.  Exercises were reviewed and Cher was able to demonstrate them prior to the end of the session.  Cher demonstrated good  understanding of the education provided.     See EMR under Patient Instructions for exercises provided prior visit.    Assessment     Quite stiff on arrival, really less extension than we had at eval.  Began tx with stim in propped extension and MHP and quickly got down to -5 deg extension.  Reiterated the need to get the knee straight with pt verbalizing understanding.  Incision looking good at this point with steri strips applied.  Did well with all other exercises performed today.  Will continue per POC.   Cher Is progressing well towards her goals.   Pt prognosis is Good.     Pt will continue to benefit from skilled outpatient physical therapy to address the deficits listed in the problem list box on initial evaluation, provide pt/family education and to maximize pt's level of independence in the home and community environment.     Pt's spiritual, cultural and educational needs considered and pt agreeable to plan of care and goals.     Anticipated barriers to physical therapy: none    Goals:  Short Term Goals: 6 weeks   Pt will be independent with HEP for continued progression beyond skilled therapy.  Pt will be able  "to ambulate I without deviation or quad avoidance gait on R.  Pt will be able to negotiate stairs reciprocally without pain or deviation on R.  Pt will be able to perform an SLR on R without lag to demonstrate improved terminal knee extension control.     Long Term Goals: 12 weeks   Pt will be able to perform a lateral step down from an 8" step without pain or limitation from R knee.  Pt will be able to return to running activities without pain or limitation from R knee.  Pt will be independent with strengthening activities in the gym on her own for safe discharge.   Pt will be able to perform a prone heel to butt without increased pain in R knee.  Pt will be able to perform a bodyweight squat without pain or limitation from R knee.  Plan      Plan of care Certification: 11/22/2023 to 02/02/24.     Outpatient Physical Therapy 2 times weekly for 10 weeks to include the following interventions: Electrical Stimulation Unattended, Manual Therapy, Neuromuscular Re-ed, Patient Education, Therapeutic Activities, and Therapeutic Exercise.     Allan Sinha, PT  11/28/2023        "

## 2023-11-30 ENCOUNTER — CLINICAL SUPPORT (OUTPATIENT)
Dept: REHABILITATION | Facility: HOSPITAL | Age: 18
End: 2023-11-30
Payer: MEDICAID

## 2023-11-30 DIAGNOSIS — M23.91 ACUTE INTERNAL DERANGEMENT OF RIGHT KNEE: ICD-10-CM

## 2023-11-30 DIAGNOSIS — R53.1 WEAKNESS: ICD-10-CM

## 2023-11-30 DIAGNOSIS — R26.2 DIFFICULTY WALKING: ICD-10-CM

## 2023-11-30 DIAGNOSIS — M25.661 STIFFNESS OF RIGHT KNEE: ICD-10-CM

## 2023-11-30 DIAGNOSIS — M25.561 ACUTE PAIN OF RIGHT KNEE: Primary | ICD-10-CM

## 2023-11-30 PROCEDURE — 97110 THERAPEUTIC EXERCISES: CPT | Mod: CQ

## 2023-11-30 PROCEDURE — 97140 MANUAL THERAPY 1/> REGIONS: CPT | Mod: CQ

## 2023-11-30 PROCEDURE — 97014 ELECTRIC STIMULATION THERAPY: CPT | Mod: CQ

## 2023-11-30 NOTE — PROGRESS NOTES
Physical Therapy Treatment Note     Name: Cher GARCIA [facility] Number: [id number]    Therapy Diagnosis:   Encounter Diagnoses   Name Primary?    Acute pain of right knee Yes    Stiffness of right knee     Weakness     Difficulty walking     Acute internal derangement of right knee      Physician: Geovany Vega MD    Visit Date: 11/30/2023    Physician Orders: PT Eval and Treat  Medical Diagnosis from Referral: s/p R ACL-R w/ QT autograft and lateral meniscus repair  Evaluation Date: 11/22/2023  Authorization Period Expiration: 11/20/24  Plan of Care Expiration: 2/02/24  Progress Note Due: 12/22/23  Visit # / Visits authorized: 3/21   FOTO: 56 at IE--FOTO DUE 12/6/23    PTA Visit #: 1/5    Time In: 2:12 PM  Time Out: 3:00 PM  Total Billable Time: 48 minutes    Precautions: Standard NWB until 12/18/23  Functional Level Prior to Evaluation: Chronic limitation from injury prior to surgery    Subjective     Pt reports: knee feeling stiff and still intermittently painful.  Intermittently compliant with HEP.  She was compliant with home exercise program.  Response to previous treatment: first visit since eval  Functional change: Still NWB    Pain: 3/10  Location: Diffuse R knee     Objective     Resting extension of -3 deg on arrival with a -13 deg lag with SLR to -16 deg.  65 deg flexion.     Treatment     Cher received therapeutic exercises to develop strength, endurance, ROM, flexibility, and posture for 17 minutes including:  Supine ball flexion rolls [id number]  Calf Dorsiflexion Stretch [id number]  Prone Quad Sets [id number]  Sidelying Hip Abduction x20 each side-Silver band    Cher received the following manual therapy techniques: Joint mobilizations, Myofacial release, and Soft tissue Mobilization were applied to the: R knee for 8 minutes, including:  Flex/Ext on EOB w/ medial/superior patellar mobs    Cher participated in neuromuscular re-education activities to improve: Balance, Coordination,  Kinesthetic, Sense, Proprioception, and Posture for   8 minutes. The following activities were included:  Long Bridging [id number]  Pikes w/ ball Pass 3x5 each alt UE/LE    Cher received the following supervised modalities after being cleared for contradictions: NMES Electrical Stimulation:  Cher received NMES Electrical Stimulation to elicit muscle contraction of the R Quad. Pt received stimulation at a rate of 50 pps with symmetric current, ramp of 2 seconds with 10 second on time and 20 second off time. Performed for 16 min w/ 8 min in propped extension w/ MHP followed by 8 min SAQ.  Patient tolerated treatment well without any adverse effects.     Home Exercises Provided and Patient Education Provided     Education provided: Review of HEP and need to get knee fully extended    Written Home Exercises Provided: Patient instructed to cont prior HEP.  Exercises were reviewed and Cher was able to demonstrate them prior to the end of the session.  Cher demonstrated good  understanding of the education provided.     See EMR under Patient Instructions for exercises provided prior visit.    Assessment     Much better extension and lag today.  Educated on Quite stiff on arrival, really less extension than we had at [facility].  Began tx with stim in propped extension and MHP and quickly got down to -5 deg extension.  Reiterated the need to get the knee straight with pt verbalizing understanding.  Incision looking good at this point with steri strips applied.  Did well with all other exercises performed today.  Will continue per POC.     Cher Is progressing well towards her goals.   Pt prognosis is Good.     Pt will continue to benefit from skilled outpatient physical therapy to address the deficits listed in the problem list box on initial evaluation, provide pt/family education and to maximize pt's level of independence in the home and community environment.     Pt's spiritual, cultural and educational needs  "considered and pt agreeable to plan of care and goals.     Anticipated barriers to physical therapy: none    Goals:  Short Term Goals: 6 weeks   Pt will be independent with HEP for continued progression beyond skilled therapy.  Pt will be able to ambulate I without deviation or quad avoidance gait on R.  Pt will be able to negotiate stairs reciprocally without pain or deviation on R.  Pt will be able to perform an SLR on R without lag to demonstrate improved terminal knee extension control.     Long Term Goals: 12 weeks   Pt will be able to perform a lateral step down from an 8" step without pain or limitation from R knee.  Pt will be able to return to running activities without pain or limitation from R knee.  Pt will be independent with strengthening activities in the gym on her own for safe discharge.   Pt will be able to perform a prone heel to butt without increased pain in R knee.  Pt will be able to perform a bodyweight squat without pain or limitation from R knee.  Plan      Plan of care Certification: 11/22/2023 to 02/02/24.     Outpatient Physical Therapy 2 times weekly for 10 weeks to include the following interventions: Electrical Stimulation Unattended, Manual Therapy, Neuromuscular Re-ed, Patient Education, Therapeutic Activities, and Therapeutic Exercise.     Jose Espinoza, PTA  11/30/2023        "

## 2023-12-05 ENCOUNTER — CLINICAL SUPPORT (OUTPATIENT)
Dept: REHABILITATION | Facility: HOSPITAL | Age: 18
End: 2023-12-05
Payer: MEDICAID

## 2023-12-05 DIAGNOSIS — R26.2 DIFFICULTY WALKING: ICD-10-CM

## 2023-12-05 DIAGNOSIS — M25.661 STIFFNESS OF RIGHT KNEE: Primary | ICD-10-CM

## 2023-12-05 DIAGNOSIS — M23.91 ACUTE INTERNAL DERANGEMENT OF RIGHT KNEE: ICD-10-CM

## 2023-12-05 DIAGNOSIS — R53.1 WEAKNESS: ICD-10-CM

## 2023-12-05 PROCEDURE — 97110 THERAPEUTIC EXERCISES: CPT | Mod: CQ

## 2023-12-05 PROCEDURE — 97140 MANUAL THERAPY 1/> REGIONS: CPT | Mod: CQ

## 2023-12-05 PROCEDURE — 97014 ELECTRIC STIMULATION THERAPY: CPT | Mod: CQ

## 2023-12-05 NOTE — PROGRESS NOTES
Physical Therapy Treatment Note     Name: Cher GARCIA [facility] Number: [id number]    Therapy Diagnosis:   Encounter Diagnoses   Name Primary?    Stiffness of right knee Yes    Weakness     Difficulty walking     Acute internal derangement of right knee      Physician: Geovany Vega MD    Visit Date: 12/5/2023    Physician Orders: PT Eval and Treat  Medical Diagnosis from Referral: s/p R ACL-R w/ QT autograft and lateral meniscus repair  Evaluation Date: 11/22/2023  Authorization Period Expiration: 11/20/24  Plan of Care Expiration: 2/02/24  Progress Note Due: 12/22/23  Visit # / Visits authorized: 4/21   FOTO: 56 at IE--FOTO DUE 12/6/23    PTA Visit #: 2/5    Time In: 2:35 PM  Time Out:  3:30 PM  Total Billable Time:  45 individual minutes (10 min non billed)    Precautions: Standard NWB until 12/18/23  Functional Level Prior to Evaluation: Chronic limitation from injury prior to surgery    Subjective     Pt reports: knee feeling stiff and still intermittently painful.  Intermittently compliant with HEP.  She was compliant with home exercise program.  Response to previous treatment: first visit since eval  Functional change: Still NWB    Pain: 3/10  Location: Diffuse R knee     Objective     Resting extension of -3 deg on arrival with a -7 deg lag with SLR to -10 deg.  80 deg flexion.     Treatment     Cher received therapeutic exercises to develop strength, endurance, ROM, flexibility, and posture for 13 individual minutes including:  Supine ball flexion rolls [id number]  Calf Dorsiflexion Stretch [id number]  Prone Quad Sets [id number]  Sidelying Hip Abduction x20 each side-Silver band  HS stretch with strap--10 x 10 sec    Cher received the following manual therapy techniques: Joint mobilizations, Myofacial release, and Soft tissue Mobilization were applied to the: R knee for 8 minutes, including:  Flex/Ext on EOB w/ medial/superior patellar mobs    Cher participated in neuromuscular re-education  activities to improve: Balance, Coordination, Kinesthetic, Sense, Proprioception, and Posture for   8 minutes. The following activities were included:  Long Bridging [id number]  Pikes w/ ball Pass 3x5 each alt UE/LE    Cher received the following supervised modalities after being cleared for contradictions: NMES Electrical Stimulation:  Cher received NMES Electrical Stimulation to elicit muscle contraction of the R Quad. Pt received stimulation at a rate of 50 pps with symmetric current, ramp of 2 seconds with 10 second on time and 20 second off time. Performed for 16 min w/ 8 min in propped extension w/ MHP followed by 8 min SAQ.  Patient tolerated treatment well without any adverse effects.     Home Exercises Provided and Patient Education Provided     Education provided: Review of HEP and need to get knee fully extended    Written Home Exercises Provided: Patient instructed to cont prior HEP.  Exercises were reviewed and Cher was able to demonstrate them prior to the end of the session.  Cher demonstrated good  understanding of the education provided.     See EMR under Patient Instructions for exercises provided prior visit.    Assessment     Not much change in extension today.  Lag better .  Began tx with stim in propped extension and MHP and quickly got down to +3 deg extension.  Reiterated the need to get the knee straight with pt verbalizing understanding.  Incision looking good at this point with steri strips applied.  Did well with all other exercises performed today.  Will continue per POC.     Cher Is progressing well towards her goals.   Pt prognosis is Good.     Pt will continue to benefit from skilled outpatient physical therapy to address the deficits listed in the problem list box on initial evaluation, provide pt/family education and to maximize pt's level of independence in the home and community environment.     Pt's spiritual, cultural and educational needs considered and pt  "agreeable to plan of care and goals.     Anticipated barriers to physical therapy: none    Goals:  Short Term Goals: 6 weeks   Pt will be independent with HEP for continued progression beyond skilled therapy.  Pt will be able to ambulate I without deviation or quad avoidance gait on R.  Pt will be able to negotiate stairs reciprocally without pain or deviation on R.  Pt will be able to perform an SLR on R without lag to demonstrate improved terminal knee extension control.     Long Term Goals: 12 weeks   Pt will be able to perform a lateral step down from an 8" step without pain or limitation from R knee.  Pt will be able to return to running activities without pain or limitation from R knee.  Pt will be independent with strengthening activities in the gym on her own for safe discharge.   Pt will be able to perform a prone heel to butt without increased pain in R knee.  Pt will be able to perform a bodyweight squat without pain or limitation from R knee.  Plan      Plan of care Certification: 11/22/2023 to 02/02/24.     Outpatient Physical Therapy 2 times weekly for 10 weeks to include the following interventions: Electrical Stimulation Unattended, Manual Therapy, Neuromuscular Re-ed, Patient Education, Therapeutic Activities, and Therapeutic Exercise.     Jose Espinoza, PTA  12/5/2023        "

## 2023-12-06 ENCOUNTER — PATIENT MESSAGE (OUTPATIENT)
Dept: FAMILY MEDICINE | Facility: CLINIC | Age: 18
End: 2023-12-06
Payer: MEDICAID

## 2023-12-07 ENCOUNTER — CLINICAL SUPPORT (OUTPATIENT)
Dept: REHABILITATION | Facility: HOSPITAL | Age: 18
End: 2023-12-07
Payer: MEDICAID

## 2023-12-07 DIAGNOSIS — R26.2 DIFFICULTY WALKING: ICD-10-CM

## 2023-12-07 DIAGNOSIS — M25.661 STIFFNESS OF RIGHT KNEE: Primary | ICD-10-CM

## 2023-12-07 DIAGNOSIS — M23.91 ACUTE INTERNAL DERANGEMENT OF RIGHT KNEE: ICD-10-CM

## 2023-12-07 DIAGNOSIS — R53.1 WEAKNESS: ICD-10-CM

## 2023-12-07 PROCEDURE — 97112 NEUROMUSCULAR REEDUCATION: CPT | Mod: CQ

## 2023-12-07 PROCEDURE — 97014 ELECTRIC STIMULATION THERAPY: CPT | Mod: CQ

## 2023-12-07 PROCEDURE — 97110 THERAPEUTIC EXERCISES: CPT | Mod: CQ

## 2023-12-07 NOTE — PROGRESS NOTES
Physical Therapy Treatment Note     Name: Cher GARCIA [facility] Number: [id number]    Therapy Diagnosis:   Encounter Diagnoses   Name Primary?    Stiffness of right knee Yes    Weakness     Difficulty walking     Acute internal derangement of right knee      Physician: Geovany Vega MD    Visit Date: 12/7/2023    Physician Orders: PT Eval and Treat  Medical Diagnosis from Referral: s/p R ACL-R w/ QT autograft and lateral meniscus repair  Evaluation Date: 11/22/2023  Authorization Period Expiration: 11/20/24  Plan of Care Expiration: 2/02/24  Progress Note Due: 12/22/23  Visit # / Visits authorized: 5/21   FOTO: 56 at IE--FOTO DUE !!!    PTA Visit #: 3/5    Time In: 2:28 PM  Time Out:  3:15  PM  Total Billable Time:  47 minutes    Precautions: Standard NWB until 12/11/23  Functional Level Prior to Evaluation: Chronic limitation from injury prior to surgery    Subjective     Pt reports: knee feeling stiff and still intermittently painful.  Intermittently compliant with HEP.  She was compliant with home exercise program.  Response to previous treatment: increased ROM  Functional change: Still NWB    Pain: 0/10  Location: Diffuse R knee     Objective     Resting extension of 0 deg on arrival with a -3 deg lag with SLR to -3 deg.  86 deg flexion.     Treatment     Cher received therapeutic exercises to develop strength, endurance, ROM, flexibility, and posture for 19 minutes including:  Supine ball flexion rolls [id number]  Calf Dorsiflexion Stretch [id number]  Prone Quad Sets [id number]  Sidelying Hip Abduction x20 each side-Silver band  HS stretch with strap--10 x 10 sec  HS Curls--3 plates--3 x 10    Cher received the following manual therapy techniques: Joint mobilizations, Myofacial release, and Soft tissue Mobilization were applied to the: R knee for 8 minutes, including:  Flex/Ext on EOB w/ medial/superior patellar mobs    Cher participated in neuromuscular re-education activities to improve: Balance,  Coordination, Kinesthetic, Sense, Proprioception, and Posture for   8 minutes. The following activities were included:  Long Bridging [id number]  Pikes w/ ball Pass 3x5 each alt UE/LE    Cher received the following supervised modalities after being cleared for contradictions: NMES Electrical Stimulation:  Cher received NMES Electrical Stimulation to elicit muscle contraction of the R Quad. Pt received stimulation at a rate of 50 pps with symmetric current, ramp of 2 seconds with 10 second on time and 20 second off time. Performed for 12 min w/ 4 min in propped extension with fig 4 overpressure followed by 4 min SLR and SAQ.  Patient tolerated treatment well without any adverse effects.     Home Exercises Provided and Patient Education Provided     Education provided: Review of HEP and need to get knee fully extended    Written Home Exercises Provided: Patient instructed to cont prior HEP.  Exercises were reviewed and Cher was able to demonstrate them prior to the end of the session.  Cher demonstrated good  understanding of the education provided.     See EMR under Patient Instructions for exercises provided prior visit.    Assessment     Better Extension and lag today.  Began tx with stim in propped extension.   +3 after ES.  Continued with mat activities.  Will start bearing weight on Monday.  Will continue per POC.     Cher Is progressing well towards her goals.   Pt prognosis is Good.     Pt will continue to benefit from skilled outpatient physical therapy to address the deficits listed in the problem list box on initial evaluation, provide pt/family education and to maximize pt's level of independence in the home and community environment.     Pt's spiritual, cultural and educational needs considered and pt agreeable to plan of care and goals.     Anticipated barriers to physical therapy: none    Goals:  Short Term Goals: 6 weeks   Pt will be independent with HEP for continued progression  "beyond skilled therapy.  Pt will be able to ambulate I without deviation or quad avoidance gait on R.  Pt will be able to negotiate stairs reciprocally without pain or deviation on R.  Pt will be able to perform an SLR on R without lag to demonstrate improved terminal knee extension control.     Long Term Goals: 12 weeks   Pt will be able to perform a lateral step down from an 8" step without pain or limitation from R knee.  Pt will be able to return to running activities without pain or limitation from R knee.  Pt will be independent with strengthening activities in the gym on her own for safe discharge.   Pt will be able to perform a prone heel to butt without increased pain in R knee.  Pt will be able to perform a bodyweight squat without pain or limitation from R knee.  Plan      Plan of care Certification: 11/22/2023 to 02/02/24.     Outpatient Physical Therapy 2 times weekly for 10 weeks to include the following interventions: Electrical Stimulation Unattended, Manual Therapy, Neuromuscular Re-ed, Patient Education, Therapeutic Activities, and Therapeutic Exercise.     Jose Espinoza, PTA  12/7/2023        "

## 2023-12-11 ENCOUNTER — CLINICAL SUPPORT (OUTPATIENT)
Dept: REHABILITATION | Facility: HOSPITAL | Age: 18
End: 2023-12-11
Payer: MEDICAID

## 2023-12-11 DIAGNOSIS — R53.1 WEAKNESS: ICD-10-CM

## 2023-12-11 DIAGNOSIS — R26.2 DIFFICULTY WALKING: ICD-10-CM

## 2023-12-11 DIAGNOSIS — M25.661 STIFFNESS OF RIGHT KNEE: Primary | ICD-10-CM

## 2023-12-11 PROCEDURE — 97014 ELECTRIC STIMULATION THERAPY: CPT

## 2023-12-11 PROCEDURE — 97140 MANUAL THERAPY 1/> REGIONS: CPT

## 2023-12-11 PROCEDURE — 97112 NEUROMUSCULAR REEDUCATION: CPT

## 2023-12-11 PROCEDURE — 97110 THERAPEUTIC EXERCISES: CPT

## 2023-12-11 NOTE — PROGRESS NOTES
Physical Therapy Treatment Note     Name: Cher GARCIA [facility] Number: [id number]    Therapy Diagnosis:   No diagnosis found.    Physician: Geovany Vega MD    Visit Date: 12/11/2023    Physician Orders: PT Eval and Treat  Medical Diagnosis from Referral: s/p R ACL-R w/ QT autograft and lateral meniscus repair  Evaluation Date: 11/22/2023  Authorization Period Expiration: 11/20/24  Plan of Care Expiration: 2/02/24  Progress Note Due: 12/22/23  Visit # / Visits authorized: 6/21   FOTO: 56 at IE   FOTO DUE!    PTA Visit #: 0/5    Time In: 01:45PM  Time Out:  02:37PM  Total Billable Time:   50 minutes billed  (2 min NB)    Precautions: Standard NWB until 12/11/23  Functional Level Prior to Evaluation: Chronic limitation from injury prior to surgery    Subjective     Pt reports: states knee has been feeling pretty good overall.  Mild intermittent pain.  Hasn't been compliant with HEP over the weekend.   She was compliant with home exercise program.  Response to previous treatment: increased ROM  Functional change: Started WB in locked extension today without pain    Pain: 0/10  Location: Diffuse R knee     Objective     Resting extension at -5 deg today with a -3 deg lag with SLR to -8 deg.  95 deg flexion.      Treatment     Cher received therapeutic exercises to develop strength, endurance, ROM, flexibility, and posture for 15 minutes including:  Supine ball flexion rolls [id number]  Prone Quad Sets [id number]  HS stretch with strap--10 x 10 sec  HS Curls--3 plates--3 x 10  Slant Board Stretch [id number]    Cher received the following manual therapy techniques: Joint mobilizations, Myofacial release, and Soft tissue Mobilization were applied to the: R knee for 8 minutes, including:  Flex/Ext on EOB w/ medial/superior patellar mobs    Cher participated in neuromuscular re-education activities to improve: Balance, Coordination, Kinesthetic, Sense, Proprioception, and Posture for   10 minutes. The following  activities were included:  Long Bridging [id number]  Planks [id number]  Leg Press [id number] in last 30 deg extension today    Therapeutic Activity for 5 minutes including:  Upright Bike x 5 min    Cher received the following supervised modalities after being cleared for contradictions: NMES Electrical Stimulation:  Cher received NMES Electrical Stimulation to elicit muscle contraction of the R Quad. Pt received stimulation at a rate of 50 pps with symmetric current, ramp of 2 seconds with 10 second on time and 20 second off time. Performed for 12 min w/ 6 min in propped extension with fig 4 overpressure followed by 6 min SAQ.  Patient tolerated treatment well without any adverse effects.     Home Exercises Provided and Patient Education Provided     Education provided: Review of HEP and need to get knee fully extended    Written Home Exercises Provided: Patient instructed to cont prior HEP.  Exercises were reviewed and Cher was able to demonstrate them prior to the end of the session.  Cher demonstrated good  understanding of the education provided.     See EMR under Patient Instructions for exercises provided prior visit.    Assessment     Loss of extension again today, but quickly gained after adding in weight and MHP to first 6 min in propped extension.  Did well with all exercises performed today, starting WB in locked extension with bilateral crutches without issue.  Did well with light leg press as well and able to make full revolutions on the bike today.  Will continue with current plan at this time.     Cher Is progressing well towards her goals.   Pt prognosis is Good.     Pt will continue to benefit from skilled outpatient physical therapy to address the deficits listed in the problem list box on initial evaluation, provide pt/family education and to maximize pt's level of independence in the home and community environment.     Pt's spiritual, cultural and educational needs considered and  "pt agreeable to plan of care and goals.     Anticipated barriers to physical therapy: none    Goals:  Short Term Goals: 6 weeks   Pt will be independent with HEP for continued progression beyond skilled therapy.  Pt will be able to ambulate I without deviation or quad avoidance gait on R.  Pt will be able to negotiate stairs reciprocally without pain or deviation on R.  Pt will be able to perform an SLR on R without lag to demonstrate improved terminal knee extension control.     Long Term Goals: 12 weeks   Pt will be able to perform a lateral step down from an 8" step without pain or limitation from R knee.  Pt will be able to return to running activities without pain or limitation from R knee.  Pt will be independent with strengthening activities in the gym on her own for safe discharge.   Pt will be able to perform a prone heel to butt without increased pain in R knee.  Pt will be able to perform a bodyweight squat without pain or limitation from R knee.  Plan      Plan of care Certification: 11/22/2023 to 02/02/24.     Outpatient Physical Therapy 2 times weekly for 10 weeks to include the following interventions: Electrical Stimulation Unattended, Manual Therapy, Neuromuscular Re-ed, Patient Education, Therapeutic Activities, and Therapeutic Exercise.     Allan Sinha, PT  12/11/2023        "

## 2023-12-13 ENCOUNTER — OFFICE VISIT (OUTPATIENT)
Dept: FAMILY MEDICINE | Facility: CLINIC | Age: 18
End: 2023-12-13
Payer: MEDICAID

## 2023-12-13 ENCOUNTER — CLINICAL SUPPORT (OUTPATIENT)
Dept: REHABILITATION | Facility: HOSPITAL | Age: 18
End: 2023-12-13
Payer: MEDICAID

## 2023-12-13 VITALS
SYSTOLIC BLOOD PRESSURE: 115 MMHG | BODY MASS INDEX: 30.76 KG/M2 | TEMPERATURE: 98 F | HEART RATE: 95 BPM | RESPIRATION RATE: 20 BRPM | DIASTOLIC BLOOD PRESSURE: 61 MMHG | HEIGHT: 64 IN | WEIGHT: 180.19 LBS | OXYGEN SATURATION: 95 %

## 2023-12-13 DIAGNOSIS — N89.8 VAGINAL DISCHARGE: Primary | ICD-10-CM

## 2023-12-13 DIAGNOSIS — R26.2 DIFFICULTY WALKING: ICD-10-CM

## 2023-12-13 DIAGNOSIS — M25.661 STIFFNESS OF RIGHT KNEE: Primary | ICD-10-CM

## 2023-12-13 DIAGNOSIS — R53.1 WEAKNESS: ICD-10-CM

## 2023-12-13 LAB
CANDIDA SPECIES: POSITIVE
GARDNERELLA: POSITIVE
TRICHOMONAS: NEGATIVE

## 2023-12-13 PROCEDURE — 1160F RVW MEDS BY RX/DR IN RCRD: CPT | Mod: CPTII,,, | Performed by: NURSE PRACTITIONER

## 2023-12-13 PROCEDURE — 97014 ELECTRIC STIMULATION THERAPY: CPT

## 2023-12-13 PROCEDURE — 97530 THERAPEUTIC ACTIVITIES: CPT

## 2023-12-13 PROCEDURE — 87591 CHLAMYDIA/GONORRHOEAE(GC), PCR: ICD-10-PCS | Mod: ,,, | Performed by: CLINICAL MEDICAL LABORATORY

## 2023-12-13 PROCEDURE — 3074F PR MOST RECENT SYSTOLIC BLOOD PRESSURE < 130 MM HG: ICD-10-PCS | Mod: CPTII,,, | Performed by: NURSE PRACTITIONER

## 2023-12-13 PROCEDURE — 3078F DIAST BP <80 MM HG: CPT | Mod: CPTII,,, | Performed by: NURSE PRACTITIONER

## 2023-12-13 PROCEDURE — 97112 NEUROMUSCULAR REEDUCATION: CPT

## 2023-12-13 PROCEDURE — 3074F SYST BP LT 130 MM HG: CPT | Mod: CPTII,,, | Performed by: NURSE PRACTITIONER

## 2023-12-13 PROCEDURE — 1159F PR MEDICATION LIST DOCUMENTED IN MEDICAL RECORD: ICD-10-PCS | Mod: CPTII,,, | Performed by: NURSE PRACTITIONER

## 2023-12-13 PROCEDURE — 87491 CHLMYD TRACH DNA AMP PROBE: CPT | Mod: ,,, | Performed by: CLINICAL MEDICAL LABORATORY

## 2023-12-13 PROCEDURE — 87660 TRICHOMONAS VAGIN DIR PROBE: CPT | Mod: ,,, | Performed by: CLINICAL MEDICAL LABORATORY

## 2023-12-13 PROCEDURE — 87510 GARDNER VAG DNA DIR PROBE: CPT | Mod: ,,, | Performed by: CLINICAL MEDICAL LABORATORY

## 2023-12-13 PROCEDURE — 99213 OFFICE O/P EST LOW 20 MIN: CPT | Mod: ,,, | Performed by: NURSE PRACTITIONER

## 2023-12-13 PROCEDURE — 3008F PR BODY MASS INDEX (BMI) DOCUMENTED: ICD-10-PCS | Mod: CPTII,,, | Performed by: NURSE PRACTITIONER

## 2023-12-13 PROCEDURE — 87591 N.GONORRHOEAE DNA AMP PROB: CPT | Mod: ,,, | Performed by: CLINICAL MEDICAL LABORATORY

## 2023-12-13 PROCEDURE — 87660 BACTERIAL VAGINOSIS: ICD-10-PCS | Mod: ,,, | Performed by: CLINICAL MEDICAL LABORATORY

## 2023-12-13 PROCEDURE — 87480 CANDIDA DNA DIR PROBE: CPT | Mod: ,,, | Performed by: CLINICAL MEDICAL LABORATORY

## 2023-12-13 PROCEDURE — 3078F PR MOST RECENT DIASTOLIC BLOOD PRESSURE < 80 MM HG: ICD-10-PCS | Mod: CPTII,,, | Performed by: NURSE PRACTITIONER

## 2023-12-13 PROCEDURE — 87491 CHLAMYDIA/GONORRHOEAE(GC), PCR: ICD-10-PCS | Mod: ,,, | Performed by: CLINICAL MEDICAL LABORATORY

## 2023-12-13 PROCEDURE — 87480 BACTERIAL VAGINOSIS: ICD-10-PCS | Mod: ,,, | Performed by: CLINICAL MEDICAL LABORATORY

## 2023-12-13 PROCEDURE — 1160F PR REVIEW ALL MEDS BY PRESCRIBER/CLIN PHARMACIST DOCUMENTED: ICD-10-PCS | Mod: CPTII,,, | Performed by: NURSE PRACTITIONER

## 2023-12-13 PROCEDURE — 1159F MED LIST DOCD IN RCRD: CPT | Mod: CPTII,,, | Performed by: NURSE PRACTITIONER

## 2023-12-13 PROCEDURE — 99213 PR OFFICE/OUTPT VISIT, EST, LEVL III, 20-29 MIN: ICD-10-PCS | Mod: ,,, | Performed by: NURSE PRACTITIONER

## 2023-12-13 PROCEDURE — 97110 THERAPEUTIC EXERCISES: CPT

## 2023-12-13 PROCEDURE — 3008F BODY MASS INDEX DOCD: CPT | Mod: CPTII,,, | Performed by: NURSE PRACTITIONER

## 2023-12-13 PROCEDURE — 87510 BACTERIAL VAGINOSIS: ICD-10-PCS | Mod: ,,, | Performed by: CLINICAL MEDICAL LABORATORY

## 2023-12-13 NOTE — PROGRESS NOTES
FREDO Wilkins        PATIENT NAME: Cher Ramos  : 2005  DATE: 23  MRN: [id number]      Patient PCP Information       Provider PCP Type    Tracie ALEXANDRE FREDO [facility]            Reason for Visit / Chief Complaint: Follow-up (Patient presents to the clinic for a f/u)         History of Present Illness / Problem Focused Workflow     Cher Ramos presents to the clinic with Follow-up (Patient presents to the clinic for a f/u)     HPI  Vaginal discharge hx of BV request screen.  50% percent of time uses protection when sexually active.  Not currently on birth control  Same partner.       Review of Systems     Review of Systems   Constitutional:  Negative for activity change, appetite change, chills, diaphoresis, fatigue, fever and unexpected weight change.   HENT:  Negative for congestion, ear pain, facial swelling, hearing loss, nosebleeds and sore throat.    Eyes: Negative.    Respiratory:  Negative for apnea, cough, shortness of breath and wheezing.    Cardiovascular:  Negative for chest pain, palpitations and leg swelling.   Gastrointestinal:  Negative for abdominal distention, abdominal pain, blood in stool, constipation, diarrhea and nausea.   Endocrine: Negative for cold intolerance, heat intolerance, polydipsia, polyphagia and polyuria.   Genitourinary:  Positive for vaginal discharge. Negative for decreased urine volume, difficulty urinating, dysuria, flank pain, frequency, hematuria and urgency.   Musculoskeletal:  Negative for arthralgias, joint swelling and myalgias.        Recent ACL repair uses crutch to assist with mobilization   Skin:  Negative for color change and rash.   Allergic/Immunologic: Negative.    Neurological:  Negative for dizziness, tremors, seizures, syncope, facial asymmetry, speech difficulty, weakness, light-headedness, numbness and headaches.   Hematological:  Negative for adenopathy. Does not bruise/bleed easily.   Psychiatric/Behavioral:  Negative  "for behavioral problems and confusion.        Medical / Social / Family History     Past Medical History:   Diagnosis Date    Allergy     Eczema        Past Surgical History:   Procedure Laterality Date    ARTHROSCOPY,KNEE,WITH MENISCUS REPAIR Right 11/20/2023    Procedure: ARTHROSCOPY,KNEE,WITH MENISCUS REPAIR;  Surgeon: Geovany Vega MD;  Location: [facility];  Service: Orthopedics;  Laterality: Right;    KNEE ARTHROSCOPY W/ ACL RECONSTRUCTION Right 11/20/2023    Procedure: RECONSTRUCTION, KNEE, ACL, ARTHROSCOPIC;  Surgeon: Geovany Vega MD;  Location: [facility];  Service: Orthopedics;  Laterality: Right;    TONSILLECTOMY      WISDOM TOOTH EXTRACTION Bilateral        Social History  Ms.  reports that she has been smoking cigarettes. She has never used smokeless tobacco. She reports that she does not drink alcohol and does not use drugs.    Family History  Ms.'s family history includes Diabetes in her maternal uncle; Hypertension in her maternal grandmother; Sickle cell trait in her maternal aunt, maternal grandfather, maternal uncle, and mother.    Medications and Allergies     Medications  Outpatient Medications Marked as Taking for the 12/13/23 encounter (Office Visit) with Tracie Weinstein FNP   Medication Sig Dispense Refill    cetirizine (ZYRTEC) 10 MG tablet Take 1 tablet (10 mg total) by mouth every evening. 90 tablet 3    triamcinolone acetonide 0.1% (KENALOG) 0.1 % cream Apply topically 2 (two) times daily as needed (eczema). 80 g 2       Allergies  Review of patient's allergies indicates:  No Known Allergies    Physical Examination     Vitals:    12/13/23 1200   BP: 115/61   BP Location: Right arm   Patient Position: Sitting   BP Method: Large (Automatic)   Pulse: 95   Resp: 20   Temp: 98.1 °F (36.7 °C)   TempSrc: Oral   SpO2: 95%   Weight: 81.7 kg (180 lb 3.2 oz)   Height: 5' 4" (1.626 m)       Physical Exam  Vitals reviewed.   Constitutional:       Appearance: Normal appearance. "   HENT:      Head: Normocephalic.      Right Ear: External ear normal.      Left Ear: External ear normal.      Nose: Nose normal.      Mouth/Throat:      Mouth: Mucous membranes are moist.   Eyes:      Extraocular Movements: Extraocular movements intact.   Cardiovascular:      Rate and Rhythm: Normal rate and regular rhythm.      Pulses: Normal pulses.      Heart sounds: Normal heart sounds.   Pulmonary:      Effort: Pulmonary effort is normal.      Breath sounds: Normal breath sounds.   Abdominal:      Palpations: Abdomen is soft.   Musculoskeletal:         General: Normal range of motion.      Cervical back: Normal range of motion.   Skin:     General: Skin is warm and dry.      Capillary Refill: Capillary refill takes less than 2 seconds.   Neurological:      General: No focal deficit present.      Mental Status: She is alert and oriented to person, place, and time.   Psychiatric:         Mood and Affect: Mood normal.         Behavior: Behavior normal.         Thought Content: Thought content normal.         Judgment: Judgment normal.           Office Visit on 12/13/2023   Component Date Value Ref Range Status    Chlamydia by PCR 12/13/2023 Negative  Negative, Invalid Final    N. gonorrhoeae (GC) by PCR 12/13/2023 Negative  Negative, Invalid Final    Candida Species 12/13/2023 Positive (A)  Negative, Invalid Final    Gardnerella 12/13/2023 Positive (A)  Negative, Invalid Final    Trichomonas 12/13/2023 Negative  Negative, Invalid Final             Assessment and Plan (including Health Maintenance)         Plan:   Vaginal discharge  -     Chlamydia/GC, PCR; Future; Expected date: 12/13/2023  -     Bacterial Vaginosis; Future; Expected date: 12/13/2023     RTC for wellness as scheduled   There are no Patient Instructions on file for this visit.       Health Maintenance Due   Topic Date Due    COVID-19 Vaccine (1) Never done    Pneumococcal Vaccines (Age 0-64) (1 - PCV) Never done    Influenza Vaccine (1) Never  done       Most Recent Immunizations   Administered Date(s) Administered    DTaP 02/25/2008    DTaP / Hep B / IPV 07/13/2004    DTaP / HiB 02/08/2005    HPV 9-Valent 09/28/2021    Hepatitis A 04/20/2009    Hepatitis A, Pediatric/Adolescent, 2 Dose 02/23/2009    MMR 02/25/2008    Meningococcal B, recombinant 09/06/2022    Meningococcal Conjugate (MCV4P) 07/20/2017    PPD Test 10/11/2022    Tdap 07/20/2017    Varicella 02/25/2008    meningococcal Conjugate (MCV4O) 09/28/2021        Problem List Items Addressed This Visit    None  Visit Diagnoses       Vaginal discharge    -  Primary    Relevant Orders    Chlamydia/GC, PCR (Completed)    Bacterial Vaginosis (Completed)            Health Maintenance Topics with due status: Not Due       Topic Last Completion Date    TETANUS VACCINE 07/20/2017    Chlamydia Screening 12/13/2023       Future Appointments   Date Time Provider Department Center   12/18/2023  1:45 PM Jose Espinoza PTA [facility]   12/20/2023  1:45 PM [facility]   12/28/2023  2:30 PM [facility]   1/2/2024  8:45 AM Geovany Vega MD [facility]   8/19/2024 10:30 AM Tracie Weinstein FNP [facility] TROY [facility]            Signature:  [facility]O Wilkins [facility]     Date of encounter: 12/13/23

## 2023-12-13 NOTE — PROGRESS NOTES
Physical Therapy Treatment Note     Name: Cher GARCIA [facility] Number: [id number]    Therapy Diagnosis:   No diagnosis found.    Physician: Geovany Vega MD    Visit Date: 12/13/2023    Physician Orders: PT Eval and Treat  Medical Diagnosis from Referral: s/p R ACL-R w/ QT autograft and lateral meniscus repair  Evaluation Date: 11/22/2023  Authorization Period Expiration: 11/20/24  Plan of Care Expiration: 2/02/24  Progress Note Due: 12/22/23  Visit # / Visits authorized: 7/21   FOTO: 56 at IE   FOTO DUE!    PTA Visit #: 0/5    Time In: 01:44PM  Time Out:  02:40PM  Total Billable Time:   56 minutes billed    Precautions: Standard  Functional Level Prior to Evaluation: Chronic limitation from injury prior to surgery    Subjective     Pt reports: states knee has been feeling pretty good overall.  Mild intermittent pain.  Hasn't been compliant with HEP over the weekend.   She was compliant with home exercise program.  Response to previous treatment: increased ROM  Functional change: Started WB in locked extension today without pain    Pain: 0/10  Location: Diffuse R knee     Objective     Resting extension at -5 with a -4 deg lag with SRL to -9 deg.  100 deg flexion.     Treatment     Cher received therapeutic exercises to develop strength, endurance, ROM, flexibility, and posture for 19 minutes including:  Supine ball flexion rolls 23w52rub  Prone Quad Sets 28w87ptr  HS stretch with strap--10 x 10 sec  HS Curls--4 plates--3 x 10  Slant Board Stretch 6x25jyp    Cher received the following manual therapy techniques: Joint mobilizations, Myofacial release, and Soft tissue Mobilization were applied to the: R knee for 5 minutes, including:  Flex/Ext on EOB w/ medial/superior patellar mobs    Cher participated in neuromuscular re-education activities to improve: Balance, Coordination, Kinesthetic, Sense, Proprioception, and Posture for   12 minutes. The following activities were included:  Long Bridging  [id number]  Planks 02g01ifd  Leg Press [id number] in last 30 deg extension today  SL Leg Press 43l7zmp x40lbs    Therapeutic Activity for 8 minutes including:  Upright Bike x 8 min    Cher received the following supervised modalities after being cleared for contradictions: NMES Electrical Stimulation:  Cher received NMES Electrical Stimulation to elicit muscle contraction of the R Quad. Pt received stimulation at a rate of 50 pps with symmetric current, ramp of 2 seconds with 10 second on time and 20 second off time. Performed for 12 min w/ 6 min in propped extension with MHP and 4lb cuff weight followed by 6 min SAQ.  Patient tolerated treatment well without any adverse effects.     Home Exercises Provided and Patient Education Provided     Education provided: Review of HEP and need to get knee fully extended    Written Home Exercises Provided: Patient instructed to cont prior HEP.  Exercises were reviewed and Cher was able to demonstrate them prior to the end of the session.  Cher demonstrated good  understanding of the education provided.     See EMR under Patient Instructions for exercises provided prior visit.    Assessment     Began tx in propped extension today, able to get back to full extension pretty easily at this point, but still coming in with a loss.  Did well with all exercises performed today, adding in single leg press without issue.  Concluded tx on bike.  Will continue with current plan.     Cher Is progressing well towards her goals.   Pt prognosis is Good.     Pt will continue to benefit from skilled outpatient physical therapy to address the deficits listed in the problem list box on initial evaluation, provide pt/family education and to maximize pt's level of independence in the home and community environment.     Pt's spiritual, cultural and educational needs considered and pt agreeable to plan of care and goals.     Anticipated barriers to physical therapy:  "none    Goals:  Short Term Goals: 6 weeks   Pt will be independent with HEP for continued progression beyond skilled therapy.  Pt will be able to ambulate I without deviation or quad avoidance gait on R.  Pt will be able to negotiate stairs reciprocally without pain or deviation on R.  Pt will be able to perform an SLR on R without lag to demonstrate improved terminal knee extension control.     Long Term Goals: 12 weeks   Pt will be able to perform a lateral step down from an 8" step without pain or limitation from R knee.  Pt will be able to return to running activities without pain or limitation from R knee.  Pt will be independent with strengthening activities in the gym on her own for safe discharge.   Pt will be able to perform a prone heel to butt without increased pain in R knee.  Pt will be able to perform a bodyweight squat without pain or limitation from R knee.  Plan      Plan of care Certification: 11/22/2023 to 02/02/24.     Outpatient Physical Therapy 2 times weekly for 10 weeks to include the following interventions: Electrical Stimulation Unattended, Manual Therapy, Neuromuscular Re-ed, Patient Education, Therapeutic Activities, and Therapeutic Exercise.     Allan Sinha, PT  12/13/2023        "

## 2023-12-14 LAB
CHLAMYDIA BY PCR: NEGATIVE
N. GONORRHOEAE (GC) BY PCR: NEGATIVE

## 2023-12-18 ENCOUNTER — CLINICAL SUPPORT (OUTPATIENT)
Dept: REHABILITATION | Facility: HOSPITAL | Age: 18
End: 2023-12-18
Payer: MEDICAID

## 2023-12-18 DIAGNOSIS — M25.661 STIFFNESS OF RIGHT KNEE: Primary | ICD-10-CM

## 2023-12-18 DIAGNOSIS — R53.1 WEAKNESS: ICD-10-CM

## 2023-12-18 DIAGNOSIS — M23.91 ACUTE INTERNAL DERANGEMENT OF RIGHT KNEE: ICD-10-CM

## 2023-12-18 DIAGNOSIS — R26.2 DIFFICULTY WALKING: ICD-10-CM

## 2023-12-18 PROCEDURE — 97112 NEUROMUSCULAR REEDUCATION: CPT | Mod: CQ

## 2023-12-18 PROCEDURE — 97014 ELECTRIC STIMULATION THERAPY: CPT | Mod: CQ

## 2023-12-18 PROCEDURE — 97110 THERAPEUTIC EXERCISES: CPT | Mod: CQ

## 2023-12-18 PROCEDURE — 97140 MANUAL THERAPY 1/> REGIONS: CPT | Mod: CQ

## 2023-12-18 PROCEDURE — 97530 THERAPEUTIC ACTIVITIES: CPT | Mod: CQ

## 2023-12-19 ENCOUNTER — PATIENT MESSAGE (OUTPATIENT)
Dept: FAMILY MEDICINE | Facility: CLINIC | Age: 18
End: 2023-12-19
Payer: MEDICAID

## 2023-12-19 RX ORDER — FLUCONAZOLE 150 MG/1
150 TABLET ORAL DAILY
Qty: 1 TABLET | Refills: 0 | Status: SHIPPED | OUTPATIENT
Start: 2023-12-19 | End: 2023-12-20

## 2023-12-19 RX ORDER — METRONIDAZOLE 500 MG/1
500 TABLET ORAL EVERY 12 HOURS
Qty: 14 TABLET | Refills: 0 | Status: SHIPPED | OUTPATIENT
Start: 2023-12-19 | End: 2023-12-26

## 2023-12-20 ENCOUNTER — CLINICAL SUPPORT (OUTPATIENT)
Dept: REHABILITATION | Facility: HOSPITAL | Age: 18
End: 2023-12-20
Payer: MEDICAID

## 2023-12-20 DIAGNOSIS — M25.661 STIFFNESS OF RIGHT KNEE: Primary | ICD-10-CM

## 2023-12-20 DIAGNOSIS — R26.2 DIFFICULTY WALKING: ICD-10-CM

## 2023-12-20 DIAGNOSIS — R53.1 WEAKNESS: ICD-10-CM

## 2023-12-20 PROCEDURE — 97014 ELECTRIC STIMULATION THERAPY: CPT | Mod: CQ

## 2023-12-20 PROCEDURE — 97112 NEUROMUSCULAR REEDUCATION: CPT | Mod: CQ

## 2023-12-20 PROCEDURE — 97140 MANUAL THERAPY 1/> REGIONS: CPT | Mod: CQ

## 2023-12-20 NOTE — PROGRESS NOTES
Physical Therapy Treatment Note     Name: Cher GARCIA [facility] Number: [id number]    Therapy Diagnosis:   Encounter Diagnoses   Name Primary?    Stiffness of right knee Yes    Weakness     Difficulty walking        Physician: Geovany Vega MD    Visit Date: 12/20/2023    Physician Orders: PT Eval and Treat  Medical Diagnosis from Referral: s/p R ACL-R w/ QT autograft and lateral meniscus repair  Evaluation Date: 11/22/2023  Authorization Period Expiration: 11/20/24  Plan of Care Expiration: 2/02/24  Progress Note Due: 12/22/23  Visit # / Visits authorized: 9/21   FOTO: 56 at IE--FOTO DUE 1/1/24   54 at visit 8 (12/18/23)    PTA Visit #: 2/5    Time In: 01:50 PM  Time Out:  3:03  PM  Total Billable Time:     39 individual minutes ( 24 min non billed)    Precautions: Standard  Functional Level Prior to Evaluation: Chronic limitation from injury prior to surgery    Subjective     Pt reports:that she is not experiencing any knee pain. She has been more compliment with HEP but it is still not at the desired level we want her at.    She was compliant with home exercise program.  Response to previous treatment: not much change  Functional change: Started WB in locked extension today without pain    Pain: 0/10  Location: Diffuse R knee     Objective     Resting extension at -3.. able to get to to -1 with QS and cueing.  -5 deg lag with SLR to -6 deg.  101 deg flexion.     Treatment     Cher received therapeutic exercises to develop strength, endurance, ROM, flexibility, and posture for 3 individual minutes including:  Supine ball flexion rolls [id number]  Prone Quad Sets 43j88uut  Prone quad stretch--3 x 30 sec  HS stretch with strap--10 x 10 sec  HS Curls--4 plates--3 x 10  Slant Board Stretch 3l76elq  Heel Drop stretch--1 min x 2    Cher received the following manual therapy techniques: Joint mobilizations, Myofacial release, and Soft tissue Mobilization were applied to the: R knee for  8 individual minutes,  including:  Flex/Ext on EOB w/ medial/superior patellar mobs    Cher participated in neuromuscular re-education activities to improve: Balance, Coordination, Kinesthetic, Sense, Proprioception, and Posture for   12 individual minutes. The following activities were included:  Long Bridging 86v68ubf  Planks 80g70igj  Leg Press 6z45r69ula in last 30 deg extension today  SL Leg Press 94b5wcy x40lbs,  Slant with QS and shift--10 x 10 sec    Therapeutic Activity for 0 individual minutes including:  Upright Bike x 4 min    Cher received the following supervised modalities after being cleared for contradictions: NMES Electrical Stimulation:  Cher received NMES Electrical Stimulation to elicit muscle contraction of the R Quad. Pt received stimulation at a rate of 50 pps with symmetric current, ramp of 2 seconds with 10 second on time and 20 second off time. Performed for 16 min w/ 8 min in propped extension with MHP and 7# cuff weight followed by 4 min SAQ and TKE.  Patient tolerated treatment well without any adverse effects.     Home Exercises Provided and Patient Education Provided     Education provided: Review of HEP and need to get knee fully extended    Written Home Exercises Provided: Patient instructed to cont prior HEP.  Exercises were reviewed and Cher was able to demonstrate them prior to the end of the session.  Cher demonstrated good  understanding of the education provided.     See EMR under Patient Instructions for exercises provided prior visit.    Assessment     Still arrived with lack of extension and poor quad control.  Better superior migration with volitional quad set. Began tx in propped extension today, able to get to +5 after ES. Patient performed all exercises well. Encouraged patient to participate more with HEP. Will continue with POC and work towards getting full extension of the knee.  Will unlock brace for ambulation next week    Cher Is progressing well towards her  "goals.   Pt prognosis is Good.     Pt will continue to benefit from skilled outpatient physical therapy to address the deficits listed in the problem list box on initial evaluation, provide pt/family education and to maximize pt's level of independence in the home and community environment.     Pt's spiritual, cultural and educational needs considered and pt agreeable to plan of care and goals.     Anticipated barriers to physical therapy: none    Goals:  Short Term Goals: 6 weeks   Pt will be independent with HEP for continued progression beyond skilled therapy.  Pt will be able to ambulate I without deviation or quad avoidance gait on R.  Pt will be able to negotiate stairs reciprocally without pain or deviation on R.  Pt will be able to perform an SLR on R without lag to demonstrate improved terminal knee extension control.     Long Term Goals: 12 weeks   Pt will be able to perform a lateral step down from an 8" step without pain or limitation from R knee.  Pt will be able to return to running activities without pain or limitation from R knee.  Pt will be independent with strengthening activities in the gym on her own for safe discharge.   Pt will be able to perform a prone heel to butt without increased pain in R knee.  Pt will be able to perform a bodyweight squat without pain or limitation from R knee.  Plan      Plan of care Certification: 11/22/2023 to 02/02/24.     Outpatient Physical Therapy 2 times weekly for 10 weeks to include the following interventions: Electrical Stimulation Unattended, Manual Therapy, Neuromuscular Re-ed, Patient Education, Therapeutic Activities, and Therapeutic Exercise.     Jose Espinoza, PTA  12/20/2023        "

## 2023-12-28 ENCOUNTER — CLINICAL SUPPORT (OUTPATIENT)
Dept: REHABILITATION | Facility: HOSPITAL | Age: 18
End: 2023-12-28
Payer: MEDICAID

## 2023-12-28 DIAGNOSIS — M25.661 STIFFNESS OF RIGHT KNEE: Primary | ICD-10-CM

## 2023-12-28 DIAGNOSIS — R26.2 DIFFICULTY WALKING: ICD-10-CM

## 2023-12-28 DIAGNOSIS — R53.1 WEAKNESS: ICD-10-CM

## 2023-12-28 PROCEDURE — 97014 ELECTRIC STIMULATION THERAPY: CPT | Mod: CQ

## 2023-12-28 PROCEDURE — 97140 MANUAL THERAPY 1/> REGIONS: CPT | Mod: CQ

## 2023-12-28 PROCEDURE — 97112 NEUROMUSCULAR REEDUCATION: CPT | Mod: CQ

## 2023-12-28 NOTE — PROGRESS NOTES
Physical Therapy Treatment Note     Name: Cher Cartwrightman  Clinic Number: [id number]    Therapy Diagnosis:   Encounter Diagnoses   Name Primary?    Stiffness of right knee Yes    Weakness     Difficulty walking        Physician: Geovany Vega MD    Visit Date: 12/28/2023    Physician Orders: PT Eval and Treat  Medical Diagnosis from Referral: s/p R ACL-R w/ QT autograft and lateral meniscus repair  Evaluation Date: 11/22/2023  Authorization Period Expiration: 11/20/24  Plan of Care Expiration: 2/02/24  Progress Note Due: 1/24/24  Visit # / Visits authorized: 10/21   FOTO: 56 at IE--FOTO DUE 1/1/24   54 at visit 8 (12/18/23)    PTA Visit #: 3/5    Time In: 01:48 PM  Time Out:   3:00 PM  Total Billable Time:    42  individual minutes (  30 min non billed)    Precautions: Standard  Functional Level Prior to Evaluation: Chronic limitation from injury prior to surgery    Subjective     Pt reports:that she is not experiencing any knee pain. Does reports some medial knee pain @ night.  Reports she has been stretching extension more.    She was compliant with home exercise program.  Response to previous treatment: not much change  Functional change: Started WB in locked extension today without pain    Pain: 0/10  Location: Diffuse R knee     Objective     Observation : Arrived Full WB with brace in extension.  Unlocked today in clinic and ambulated with minimal quad avoidance with brace unlocked.        Range of Motion/Strength :         Left Extremity                                                                        Right Extremity   AROM Strength  Location  AROM   Strength   145  5/5   Knee    Flexion (140)  110 3-/5    +5  5/5                Extension (0) +2 3-/5        Functional Impairments :    Ambulates full WB with brace unlocked and minimal quad avoidance  Unable to negotiate stairs  Unable to perform bodyweight squat  Able to perform functional ADL at this time.     Treatment     Cher received  therapeutic exercises to develop strength, endurance, ROM, flexibility, and posture for 0 individual minutes including:  Supine ball flexion rolls 82o55pkb  Prone Quad Sets 68d22edz  Prone quad stretch--3 x 30 sec  HS stretch with strap--10 x 10 sec  HS Curls--5 plates--3 x 10  Slant Board Stretch 1i87tgf  Heel Drop stretch--1 min x 2    Cher received the following manual therapy techniques: Joint mobilizations, Myofacial release, and Soft tissue Mobilization were applied to the: R knee for  8 individual minutes, including:  Flex/Ext on EOB w/ medial/superior patellar mobs    hCer participated in neuromuscular re-education activities to improve: Balance, Coordination, Kinesthetic, Sense, Proprioception, and Posture for   22 individual minutes. The following activities were included:  Long Bridging 17z33ygg  Planks 84e66bum  Leg Press [id number] in last 30 deg extension today  SL Leg Press 48p6gzy x50lbs,  Slant with QS and shift--10 x 10 sec    Therapeutic Activity for 0 individual minutes including:  Upright Bike x 4 min  High marchChewse for WB acceptance    Cher received the following supervised modalities after being cleared for contradictions: NMES Electrical Stimulation:  Cher received NMES Electrical Stimulation to elicit muscle contraction of the R Quad. Pt received stimulation at a rate of 50 pps with symmetric current, ramp of 2 seconds with 10 second on time and 20 second off time. Performed for 12 min w/ 4 min in propped extension with MHP and 7# cuff weight followed by 4 min SAQ and TKE.  Patient tolerated treatment well without any adverse effects.     Home Exercises Provided and Patient Education Provided     Education provided: Review of HEP and need to get knee fully extended    Written Home Exercises Provided: Patient instructed to cont prior HEP.  Exercises were reviewed and Cher was able to demonstrate them prior to the end of the session.  Cher demonstrated good   "understanding of the education provided.     See EMR under Patient Instructions for exercises provided prior visit.    Assessment     Arrived with better extension... still with lack of quad control.  Better superior migration with volitional quad set. Began tx in propped extension today, able to get to +5 after ES. Patient performed all exercises well. Increased weight on LP and added high marches for weight acceptance.  Will continue with POC and work towards getting full extension of the knee. Cont POC    Cher Is progressing well towards her goals.   Pt prognosis is Good.     Pt will continue to benefit from skilled outpatient physical therapy to address the deficits listed in the problem list box on initial evaluation, provide pt/family education and to maximize pt's level of independence in the home and community environment.     Pt's spiritual, cultural and educational needs considered and pt agreeable to plan of care and goals.     Anticipated barriers to physical therapy: none    Goals:  Short Term Goals: 6 weeks   Pt will be independent with HEP for continued progression beyond skilled therapy.--Compliant--MET  Pt will be able to ambulate I without deviation or quad avoidance gait on R.--minimal quad avoidance--PROGRESSING  Pt will be able to negotiate stairs reciprocally without pain or deviation on R.--unable--ONGOING  Pt will be able to perform an SLR on R without lag to demonstrate improved terminal knee extension control.--     Long Term Goals: 12 weeks   Pt will be able to perform a lateral step down from an 8" step without pain or limitation from R knee.  Pt will be able to return to running activities without pain or limitation from R knee.  Pt will be independent with strengthening activities in the gym on her own for safe discharge.   Pt will be able to perform a prone heel to butt without increased pain in R knee.  Pt will be able to perform a bodyweight squat without pain or limitation from " R knee.  Plan      Plan of care Certification: 11/22/2023 to 02/02/24.     Outpatient Physical Therapy 2 times weekly for 10 weeks to include the following interventions: Electrical Stimulation Unattended, Manual Therapy, Neuromuscular Re-ed, Patient Education, Therapeutic Activities, and Therapeutic Exercise.     Jose Espinoza, PTA  12/28/2023

## 2023-12-29 NOTE — PLAN OF CARE
Physical Therapy Progress Note     Name: Cher Cartwrightman  Clinic Number: [id number]    Therapy Diagnosis:   Encounter Diagnoses   Name Primary?    Stiffness of right knee Yes    Weakness     Difficulty walking        Physician: Geovany Vega MD    Visit Date: 12/28/2023    Physician Orders: PT Eval and Treat  Medical Diagnosis from Referral: s/p R ACL-R w/ QT autograft and lateral meniscus repair  Evaluation Date: 11/22/2023  Authorization Period Expiration: 11/20/24  Plan of Care Expiration: 2/02/24  Progress Note Due: 1/24/24  Visit # / Visits authorized: 10/21   FOTO: 56 at IE--FOTO DUE 1/1/24   54 at visit 8 (12/18/23)    PTA Visit #: 3/5    Time In: 01:48 PM  Time Out:   3:00 PM  Total Billable Time:    42  individual minutes (  30 min non billed)    Precautions: Standard  Functional Level Prior to Evaluation: Chronic limitation from injury prior to surgery    Subjective     Pt reports:that she is not experiencing any knee pain. Does reports some medial knee pain @ night.  Reports she has been stretching extension more.    She was compliant with home exercise program.  Response to previous treatment: not much change  Functional change: Started WB in locked extension today without pain    Pain: 0/10  Location: Diffuse R knee     Objective     Observation : Arrived Full WB with brace in extension.  Unlocked today in clinic and ambulated with minimal quad avoidance with brace unlocked.        Range of Motion/Strength :         Left Extremity                                                                        Right Extremity   AROM Strength  Location  AROM   Strength   145  5/5   Knee    Flexion (140)  110 3-/5    +5  5/5                Extension (0) +2 3-/5        Functional Impairments :    Ambulates full WB with brace unlocked and minimal quad avoidance  Unable to negotiate stairs  Unable to perform bodyweight squat  Able to perform functional ADL at this time.     Treatment     Cher received  therapeutic exercises to develop strength, endurance, ROM, flexibility, and posture for 0 individual minutes including:  Supine ball flexion rolls [id number]  Prone Quad Sets 81l97evb  Prone quad stretch--3 x 30 sec  HS stretch with strap--10 x 10 sec  HS Curls--5 plates--3 x 10  Slant Board Stretch 1r90rsh  Heel Drop stretch--1 min x 2    Cher received the following manual therapy techniques: Joint mobilizations, Myofacial release, and Soft tissue Mobilization were applied to the: R knee for  8 individual minutes, including:  Flex/Ext on EOB w/ medial/superior patellar mobs    Cher participated in neuromuscular re-education activities to improve: Balance, Coordination, Kinesthetic, Sense, Proprioception, and Posture for   22 individual minutes. The following activities were included:  Long Bridging 65t52hdu  Planks 76s15xoa  Leg Press 5y64r50elr in last 30 deg extension today  SL Leg Press 94d9ydg x50lbs,  Slant with QS and shift--10 x 10 sec    Therapeutic Activity for 0 individual minutes including:  Upright Bike x 4 min  High marchSxmobi [employer] and Technology for WB acceptance    Cher received the following supervised modalities after being cleared for contradictions: NMES Electrical Stimulation:  Cher received NMES Electrical Stimulation to elicit muscle contraction of the R Quad. Pt received stimulation at a rate of 50 pps with symmetric current, ramp of 2 seconds with 10 second on time and 20 second off time. Performed for 12 min w/ 4 min in propped extension with MHP and 7# cuff weight followed by 4 min SAQ and TKE.  Patient tolerated treatment well without any adverse effects.     Home Exercises Provided and Patient Education Provided     Education provided: Review of HEP and need to get knee fully extended    Written Home Exercises Provided: Patient instructed to cont prior HEP.  Exercises were reviewed and Cher was able to demonstrate them prior to the end of the session.  Cher demonstrated good   "understanding of the education provided.     See EMR under Patient Instructions for exercises provided prior visit.    Assessment     Arrived with better extension... still with lack of quad control.  Better superior migration with volitional quad set. Began tx in propped extension today, able to get to +5 after ES. Patient performed all exercises well. Increased weight on LP and added high marches for weight acceptance.  Will continue with POC and work towards getting full extension of the knee. Cont POC    Cher Is progressing well towards her goals.   Pt prognosis is Good.     Pt will continue to benefit from skilled outpatient physical therapy to address the deficits listed in the problem list box on initial evaluation, provide pt/family education and to maximize pt's level of independence in the home and community environment.     Pt's spiritual, cultural and educational needs considered and pt agreeable to plan of care and goals.     Anticipated barriers to physical therapy: none    Goals:  Short Term Goals: 6 weeks   Pt will be independent with HEP for continued progression beyond skilled therapy.--Compliant--MET  Pt will be able to ambulate I without deviation or quad avoidance gait on R.--minimal quad avoidance--PROGRESSING  Pt will be able to negotiate stairs reciprocally without pain or deviation on R.--unable--ONGOING  Pt will be able to perform an SLR on R without lag to demonstrate improved terminal knee extension control.--     Long Term Goals: 12 weeks   Pt will be able to perform a lateral step down from an 8" step without pain or limitation from R knee.  Pt will be able to return to running activities without pain or limitation from R knee.  Pt will be independent with strengthening activities in the gym on her own for safe discharge.   Pt will be able to perform a prone heel to butt without increased pain in R knee.  Pt will be able to perform a bodyweight squat without pain or limitation from " R knee.  Plan      Plan of care Certification: 11/22/2023 to 02/02/24.     Outpatient Physical Therapy 2 times weekly for 10 weeks to include the following interventions: Electrical Stimulation Unattended, Manual Therapy, Neuromuscular Re-ed, Patient Education, Therapeutic Activities, and Therapeutic Exercise.

## 2024-01-02 ENCOUNTER — PATIENT MESSAGE (OUTPATIENT)
Dept: ORTHOPEDICS | Facility: CLINIC | Age: 19
End: 2024-01-02
Payer: MEDICAID

## 2024-01-08 ENCOUNTER — CLINICAL SUPPORT (OUTPATIENT)
Dept: REHABILITATION | Facility: HOSPITAL | Age: 19
End: 2024-01-08
Payer: MEDICAID

## 2024-01-08 DIAGNOSIS — R26.2 DIFFICULTY WALKING: ICD-10-CM

## 2024-01-08 DIAGNOSIS — R53.1 WEAKNESS: ICD-10-CM

## 2024-01-08 DIAGNOSIS — M25.661 STIFFNESS OF RIGHT KNEE: Primary | ICD-10-CM

## 2024-01-08 PROCEDURE — 97140 MANUAL THERAPY 1/> REGIONS: CPT

## 2024-01-08 PROCEDURE — 97112 NEUROMUSCULAR REEDUCATION: CPT

## 2024-01-08 NOTE — PROGRESS NOTES
Physical Therapy Treatment Note     Name: Cher GARCIA [facility] Number: [id number]    Therapy Diagnosis:   No diagnosis found.      Physician: Geovany Vega MD    Visit Date: 1/8/2024    Physician Orders: PT Eval and Treat  Medical Diagnosis from Referral: s/p R ACL-R w/ QT autograft and lateral meniscus repair  Evaluation Date: 11/22/2023  Authorization Period Expiration: 11/20/24  Plan of Care Expiration: 2/02/24  Progress Note Due: 1/24/24  Visit # / Visits authorized: 11/21   FOTO: 56 at IE--FOTO DUE 1/1/24   54 at visit 8 (12/18/23)    PTA Visit #: 0/5    Time In: 03:18PM  Time Out:   04:06PM  Total Billable Time:    30 min billed (33 min NB)    Precautions: Standard  Functional Level Prior to Evaluation: Chronic limitation from injury prior to surgery    Subjective     Pt reports: knee feels good at this point.  No pain with full weightbearing.   She was compliant with home exercise program.  Response to previous treatment: not much change  Functional change: Started WB in locked extension today without pain    Pain: 0/10  Location: Diffuse R knee     Objective     -1 deg resting extension with a -2 deg lag with SLR.  130 deg flexion.     Treatment     Cher received therapeutic exercises to develop strength, endurance, ROM, flexibility, and posture for 6 individual minutes including:  Supine ball flexion rolls [id number]  Prone Quad Sets [id number]  Prone quad stretch--3 x 30 sec  HS stretch with strap--10 x 10 sec  HS Curls--5 plates--3 x 10  Slant Board Stretch [id number]  Heel Drop stretch--1 min x 2    Cher received the following manual therapy techniques: Joint mobilizations, Myofacial release, and Soft tissue Mobilization were applied to the: R knee for  8 individual minutes, including:  Flex/Ext on EOB w/ medial/superior patellar mobs    Cher participated in neuromuscular re-education activities to improve: Balance, Coordination, Kinesthetic, Sense, Proprioception, and Posture for   16  "individual minutes. The following activities were included:  Long Bridging [id number]  Planks 81i24gps  SL Leg Press [id number]  Fwd Step Downs 4x5 from 6" step  Wall Ball Squats 3x15  Split Squats w/ UE assist in rack 3x5 each  Slant with QS and shift--10 x 10 sec    Therapeutic Activity for 0 individual minutes including:  Upright Bike x 8 min      Home Exercises Provided and Patient Education Provided     Education provided: Review of HEP and need to get knee fully extended    Written Home Exercises Provided: Patient instructed to cont prior HEP.  Exercises were reviewed and Cher was able to demonstrate them prior to the end of the session.  hCer demonstrated good  understanding of the education provided.     See EMR under Patient Instructions for exercises provided prior visit.    Assessment     Good improvement in both ROM and superior patellar migration on arrival today.  Very little lag with SLR.  Knee really starting to do well at this point.  Ramped up closed chain activities today with pt doing well.  Felt good at conclusion of tx.  Will continue with current plan.     Cher Is progressing well towards her goals.   Pt prognosis is Good.     Pt will continue to benefit from skilled outpatient physical therapy to address the deficits listed in the problem list box on initial evaluation, provide pt/family education and to maximize pt's level of independence in the home and community environment.     Pt's spiritual, cultural and educational needs considered and pt agreeable to plan of care and goals.     Anticipated barriers to physical therapy: none    Goals:  Short Term Goals: 6 weeks   Pt will be independent with HEP for continued progression beyond skilled therapy.--Compliant--MET  Pt will be able to ambulate I without deviation or quad avoidance gait on R.--minimal quad avoidance--PROGRESSING  Pt will be able to negotiate stairs reciprocally without pain or deviation on R.--unable--ONGOING  Pt " "will be able to perform an SLR on R without lag to demonstrate improved terminal knee extension control.--     Long Term Goals: 12 weeks   Pt will be able to perform a lateral step down from an 8" step without pain or limitation from R knee.  Pt will be able to return to running activities without pain or limitation from R knee.  Pt will be independent with strengthening activities in the gym on her own for safe discharge.   Pt will be able to perform a prone heel to butt without increased pain in R knee.  Pt will be able to perform a bodyweight squat without pain or limitation from R knee.  Plan      Plan of care Certification: 11/22/2023 to 02/02/24.     Outpatient Physical Therapy 2 times weekly for 10 weeks to include the following interventions: Electrical Stimulation Unattended, Manual Therapy, Neuromuscular Re-ed, Patient Education, Therapeutic Activities, and Therapeutic Exercise.     Allan Sinha, PT  1/8/2024        "

## 2024-01-10 ENCOUNTER — PATIENT MESSAGE (OUTPATIENT)
Dept: ORTHOPEDICS | Facility: CLINIC | Age: 19
End: 2024-01-10
Payer: MEDICAID

## 2024-01-11 ENCOUNTER — CLINICAL SUPPORT (OUTPATIENT)
Dept: REHABILITATION | Facility: HOSPITAL | Age: 19
End: 2024-01-11
Payer: MEDICAID

## 2024-01-11 ENCOUNTER — OFFICE VISIT (OUTPATIENT)
Dept: ORTHOPEDICS | Facility: CLINIC | Age: 19
End: 2024-01-11
Payer: MEDICAID

## 2024-01-11 DIAGNOSIS — Z98.890 S/P ACL REPAIR: Primary | ICD-10-CM

## 2024-01-11 DIAGNOSIS — R53.1 WEAKNESS: Primary | ICD-10-CM

## 2024-01-11 PROCEDURE — 99024 POSTOP FOLLOW-UP VISIT: CPT | Mod: ,,, | Performed by: ORTHOPAEDIC SURGERY

## 2024-01-11 PROCEDURE — 99212 OFFICE O/P EST SF 10 MIN: CPT | Mod: PBBFAC | Performed by: ORTHOPAEDIC SURGERY

## 2024-01-11 PROCEDURE — 1159F MED LIST DOCD IN RCRD: CPT | Mod: CPTII,,, | Performed by: ORTHOPAEDIC SURGERY

## 2024-01-11 PROCEDURE — 97140 MANUAL THERAPY 1/> REGIONS: CPT | Mod: CQ

## 2024-01-11 PROCEDURE — 1160F RVW MEDS BY RX/DR IN RCRD: CPT | Mod: CPTII,,, | Performed by: ORTHOPAEDIC SURGERY

## 2024-01-11 PROCEDURE — 97112 NEUROMUSCULAR REEDUCATION: CPT | Mod: CQ

## 2024-01-11 NOTE — PROGRESS NOTES
HISTORY OF PRESENT ILLNESS:       Reconstruction, Knee, Acl, Arthroscopic - Right and Arthroscopy,knee,with Meniscus Repair - Right 11/20/2023      Pt is here today for Second post-operative followup of her knee arthroscopy.      she is doing well.  She is still in PT here at [facility] and feels she is progressing well.     We have reviewed her findings and discussed plan of care and future treatment options, including the physical therapy plan.                                                                                     PHYSICAL EXAMINATION:     Incision sites healed well  No evidence of any erythema, infection or induration  Range of motion 0-120 degrees  Minimal effusion  2+ DP pulse  No swelling, no calf tenderness  - Jourdan's sign  Negative medial joint line tendernes  Moderate quad atrophy                                                                                 ASSESSMENT:                                                                                                                                               1. Status post above, doing well.                                                                                                                               PLAN:                                                                                                                                                     1. Continue with PT  2. Emphasized quad function.  3. I have discussed return to activity in detail.  4. she will see us back in 6 weeks.                                      5. All questions were answered and she should contact us if she  has any questions or concerns in the interim.              There are no Patient Instructions on file for this visit.

## 2024-01-11 NOTE — PROGRESS NOTES
Physical Therapy Treatment Note     Name: Cher GARCIA [facility] Number: [id number]    Therapy Diagnosis:   Encounter Diagnosis   Name Primary?    Weakness Yes         Physician: Geovany Vega MD    Visit Date: 1/11/2024    Physician Orders: PT Eval and Treat  Medical Diagnosis from Referral: s/p R ACL-R w/ QT autograft and lateral meniscus repair  Evaluation Date: 11/22/2023  Authorization Period Expiration: 2/8/24  Plan of Care Expiration: 2/02/24  Progress Note Due: 1/24/24  Visit # / Visits authorized: 12/21   FOTO: 56 at IE--FOTO DUE 1/25/24   54 at visit 8 (12/18/23)   59 at visit 12 (1/11/24)    PTA Visit #: 1/5    Time In: 03:25 PM  Time Out:  4:05 PM  Total Billable Time:     26 individual min billed ( 14 min NB)    Precautions: Standard  Functional Level Prior to Evaluation: Chronic limitation from injury prior to surgery    Subjective     Pt reports: knee feels good at this point.  No pain with full weightbearing.   She was compliant with home exercise program.  Response to previous treatment: not much change  Functional change: Started WB in locked extension today without pain    Pain: 0/10  Location: Diffuse R knee     Objective     -0 deg resting extension with a -2 deg lag with initial SLR, but will get it back to 0 with cueing.  130 deg flexion.     Treatment     Cher received therapeutic exercises to develop strength, endurance, ROM, flexibility, and posture for 0 individual minutes including:  Prone quad stretch--3 x 30 sec  HS stretch with strap--10 x 10 sec  HS Curls--5 plates--3 x 10  Slant Board Stretch [id number]  Calf Raise--3 x 10   SLR--4 x 5 x 3  Cher received the following manual therapy techniques: Joint mobilizations, Myofacial release, and Soft tissue Mobilization were applied to the: R knee for  8 individual minutes, including:  Flex/Ext on EOB w/ medial/superior patellar mobs    Cher participated in neuromuscular re-education activities to improve: Balance, Coordination,  "Kinesthetic, Sense, Proprioception, and Posture for   18 individual minutes. The following activities were included:  Long Bridging [id number]  Planks [id number]  SL Leg Press [id number]  Fwd Step Downs 4x5 from 6" step  Wall Ball Squats 3x15  Split Squats w/ UE assist in rack 3x5 each  Heel with QS and shift--10 x 10 sec  Lateral eccentric lowering--4"--3 x 5      Therapeutic Activity for 0 individual minutes including:  Upright Bike x 8 min      Home Exercises Provided and Patient Education Provided     Education provided: Review of HEP and need to get knee fully extended    Written Home Exercises Provided: Patient instructed to cont prior HEP.  Exercises were reviewed and Cher was able to demonstrate them prior to the end of the session.  Cher demonstrated good  understanding of the education provided.     See EMR under Patient Instructions for exercises provided prior visit.    Assessment     Good improvement in both ROM and superior patellar migration on arrival today.  Very little lag with SLR.  Knee really starting to do well at this point.  Ramped up closed chain activities today with pt doing well.  Felt good at conclusion of tx.  Will continue with current plan.     Cher Is progressing well towards her goals.   Pt prognosis is Good.     Pt will continue to benefit from skilled outpatient physical therapy to address the deficits listed in the problem list box on initial evaluation, provide pt/family education and to maximize pt's level of independence in the home and community environment.     Pt's spiritual, cultural and educational needs considered and pt agreeable to plan of care and goals.     Anticipated barriers to physical therapy: none    Goals:  Short Term Goals: 6 weeks   Pt will be independent with HEP for continued progression beyond skilled therapy.--Compliant--MET  Pt will be able to ambulate I without deviation or quad avoidance gait on R.--minimal quad avoidance--PROGRESSING  Pt will " "be able to negotiate stairs reciprocally without pain or deviation on R.--unable--ONGOING  Pt will be able to perform an SLR on R without lag to demonstrate improved terminal knee extension control.--     Long Term Goals: 12 weeks   Pt will be able to perform a lateral step down from an 8" step without pain or limitation from R knee.  Pt will be able to return to running activities without pain or limitation from R knee.  Pt will be independent with strengthening activities in the gym on her own for safe discharge.   Pt will be able to perform a prone heel to butt without increased pain in R knee.  Pt will be able to perform a bodyweight squat without pain or limitation from R knee.  Plan      Plan of care Certification: 11/22/2023 to 02/02/24.     Outpatient Physical Therapy 2 times weekly for 10 weeks to include the following interventions: Electrical Stimulation Unattended, Manual Therapy, Neuromuscular Re-ed, Patient Education, Therapeutic Activities, and Therapeutic Exercise.     Jose Espinoza, PTA  1/11/2024        "

## 2024-01-19 ENCOUNTER — CLINICAL SUPPORT (OUTPATIENT)
Dept: REHABILITATION | Facility: HOSPITAL | Age: 19
End: 2024-01-19
Payer: MEDICAID

## 2024-01-19 DIAGNOSIS — R53.1 WEAKNESS: Primary | ICD-10-CM

## 2024-01-19 DIAGNOSIS — M25.661 STIFFNESS OF RIGHT KNEE: ICD-10-CM

## 2024-01-19 PROCEDURE — 97110 THERAPEUTIC EXERCISES: CPT | Mod: CQ

## 2024-01-19 PROCEDURE — 97112 NEUROMUSCULAR REEDUCATION: CPT | Mod: CQ

## 2024-01-19 NOTE — PROGRESS NOTES
Physical Therapy Treatment Note     Name: Cher GARCIA [facility] Number: [id number]    Therapy Diagnosis:   Encounter Diagnoses   Name Primary?    Weakness Yes    Stiffness of right knee          Physician: Geovany Vega MD    Visit Date: 1/19/2024    Physician Orders: PT Eval and Treat  Medical Diagnosis from Referral: s/p R ACL-R w/ QT autograft and lateral meniscus repair  Evaluation Date: 11/22/2023  Authorization Period Expiration: 2/8/24  Plan of Care Expiration: 2/02/24  Visit # / Visits authorized: 13/21   FOTO: 56 at IE--FOTO DUE 1/25/24   54 at visit 8 (12/18/23)   59 at visit 12 (1/11/24)    PTA Visit #: 2/5    Time In: 10:45 AM  Time Out:  11:43 M  Total Billable Time:   23  individual min billed ( 35 min NB)    Precautions: Standard  Functional Level Prior to Evaluation: Chronic limitation from injury prior to surgery    Subjective     Pt reports: knee feels good at this point.  No pain with full weightbearing.   She was compliant with home exercise program.  Response to previous treatment: not much change  Functional change: Started WB in locked extension today without pain    Pain: 0/10  Location: Diffuse R knee     Objective     -0 deg resting extension with a -2 deg lag with initial SLR, but will get it back to 0 with cueing.  130 deg flexion.     +5 after W/U    Treatment     Cher received therapeutic exercises to develop strength, endurance, ROM, flexibility, and posture for 10 individual minutes including:  Prone quad stretch--3 x 30 sec  Slant Board Stretch [id number]  Calf Raise--3 x 10   SLR--4 x 5 x 3  Maria Isabel stretch--throughout session  Cher received the following manual therapy techniques: Joint mobilizations, Myofacial release, and Soft tissue Mobilization were applied to the: R knee for  0 minutes, including:  Flex/Ext on EOB w/ medial/superior patellar mobs  MFR to HS    Cher participated in neuromuscular re-education activities to improve: Balance, Coordination,  "Kinesthetic, Sense, Proprioception, and Posture for   13 individual minutes. The following activities were included:  Long Bridging [id number]  Planks [id number]  SL Leg Press [id number]  Fwd Step Downs 4x5 from 6" step  Wall Ball Squats 3x15  Split Squats w/ UE assist in rack 3x5 each  Heel with QS and shift--10 x 10 sec  Lateral eccentric lowering--4"--3 x 5  Prone HS Curls--2 plates--3 x 8  Seated Ext-1 plate--SL--3 x 5   RFESS--3 x 5 --with balance assist      Therapeutic Activity for 0 individual minutes including:  Upright Bike x 8 min      Home Exercises Provided and Patient Education Provided     Education provided: Review of HEP and need to get knee fully extended    Written Home Exercises Provided: Patient instructed to cont prior HEP.  Exercises were reviewed and Cher was able to demonstrate them prior to the end of the session.  Cher demonstrated good  understanding of the education provided.     See EMR under Patient Instructions for exercises provided prior visit.    Assessment     In a good spot with ROM and quad control. Knee really starting to do well at this point.  Ramped up closed chain activities today with pt doing well.  Felt good at conclusion of tx.  Will continue with current plan.     Cher Is progressing well towards her goals.   Pt prognosis is Good.     Pt will continue to benefit from skilled outpatient physical therapy to address the deficits listed in the problem list box on initial evaluation, provide pt/family education and to maximize pt's level of independence in the home and community environment.     Pt's spiritual, cultural and educational needs considered and pt agreeable to plan of care and goals.     Anticipated barriers to physical therapy: none    Goals:  Short Term Goals: 6 weeks   Pt will be independent with HEP for continued progression beyond skilled therapy.--Compliant--MET  Pt will be able to ambulate I without deviation or quad avoidance gait on R.--minimal " "quad avoidance--PROGRESSING  Pt will be able to negotiate stairs reciprocally without pain or deviation on R.--unable--ONGOING  Pt will be able to perform an SLR on R without lag to demonstrate improved terminal knee extension control.--     Long Term Goals: 12 weeks   Pt will be able to perform a lateral step down from an 8" step without pain or limitation from R knee.  Pt will be able to return to running activities without pain or limitation from R knee.  Pt will be independent with strengthening activities in the gym on her own for safe discharge.   Pt will be able to perform a prone heel to butt without increased pain in R knee.  Pt will be able to perform a bodyweight squat without pain or limitation from R knee.  Plan      Plan of care Certification: 11/22/2023 to 02/02/24.     Outpatient Physical Therapy 2 times weekly for 10 weeks to include the following interventions: Electrical Stimulation Unattended, Manual Therapy, Neuromuscular Re-ed, Patient Education, Therapeutic Activities, and Therapeutic Exercise.     Jose Espinoza, PTA  1/19/2024        "

## 2024-01-30 ENCOUNTER — CLINICAL SUPPORT (OUTPATIENT)
Dept: REHABILITATION | Facility: HOSPITAL | Age: 19
End: 2024-01-30
Payer: MEDICAID

## 2024-01-30 DIAGNOSIS — R53.1 WEAKNESS: Primary | ICD-10-CM

## 2024-01-30 DIAGNOSIS — M25.661 STIFFNESS OF RIGHT KNEE: ICD-10-CM

## 2024-01-30 PROCEDURE — 97530 THERAPEUTIC ACTIVITIES: CPT | Mod: CQ

## 2024-01-30 PROCEDURE — 97112 NEUROMUSCULAR REEDUCATION: CPT | Mod: CQ

## 2024-01-30 PROCEDURE — 97014 ELECTRIC STIMULATION THERAPY: CPT | Mod: CQ

## 2024-01-30 PROCEDURE — 97110 THERAPEUTIC EXERCISES: CPT | Mod: CQ

## 2024-01-30 NOTE — PROGRESS NOTES
Physical Therapy Treatment Note     Name: Cher GARCIA [facility] Number: [id number]    Therapy Diagnosis:   Encounter Diagnoses   Name Primary?    Weakness Yes    Stiffness of right knee          Physician: Geovany Vega MD    Visit Date: 1/30/2024    Physician Orders: PT Eval and Treat  Medical Diagnosis from Referral: s/p R ACL-R w/ QT autograft and lateral meniscus repair  Evaluation Date: 11/22/2023  Authorization Period Expiration: 2/8/24  Plan of Care Expiration: 2/02/24  Visit # / Visits authorized: 14/21   FOTO: 56 at IE--FOTO DUE 1/25/24   54 at visit 8 (12/18/23)   59 at visit 12 (1/11/24)    PTA Visit #: 3/5    Time In: 11:10 AM  Time Out:   12:14 PM  Total Billable Time:   53  individual min billed (10 min NB)    Precautions: Standard  Functional Level Prior to Evaluation: Chronic limitation from injury prior to surgery    Subjective     Pt reports: knee feels good at this point.  No pain with full weightbearing. Reports she has not been doing her HEP.    She was compliant with home exercise program.  Response to previous treatment: not much change  Functional change: Started WB in locked extension today without pain    Pain: 0/10  Location: Diffuse R knee     Objective     -5 deg resting extension with a -2 deg lag to -7 with initial SLR..  130 deg flexion.     +2 degrees with -2 degree lag after ES, but able to get back    Treatment     Cher received therapeutic exercises to develop strength, endurance, ROM, flexibility, and posture for 15 individual minutes including:  Heel Drop stretch--1 min x 2  Calf Raise--3 x 10   SLR--4 x 5 x 3  Maria Isabel stretch--throughout session  Cher received the following manual therapy techniques: Joint mobilizations, Myofacial release, and Soft tissue Mobilization were applied to the: R knee for  0 minutes, including:    Cher participated in neuromuscular re-education activities to improve: Balance, Coordination, Kinesthetic, Sense, Proprioception, and  "Posture for   17 individual minutes. The following activities were included:  SL Leg Press [id number]  Fwd Step Downs 4x5 from 6" step  Split Squats w/ UE assist in rack 3x5 each  Heel with QS and shift--10 x 10 sec  Lateral eccentric lowering--6"--3 x 5  Prone HS Curls--2 plates--3 x 8  Seated Ext-1 plate--SL--3 x 5   Forward step ups--8"--3 x 5  SLRDL--3 x 5  Wall sits--10 sec x 5      Therapeutic Activity for  8 individual minutes including:  Upright Bike x 8 min    Cher received the following supervised modalities after being cleared for contradictions: NMES Electrical Stimulation:  Cher received NMES Electrical Stimulation to elicit muscle contraction of the R Quad. Pt received stimulation at a rate of 50 pps with symmetric current, ramp of 2 seconds with 10 second on time and 20 second off time. Performed for  13 min w/ 8 min in propped extension with MHP and 8# cuff weight followed by 4 min SLR.  Patient tolerated treatment well without any adverse effects.     Home Exercises Provided and Patient Education Provided     Education provided: Review of HEP and need to get knee fully extended    Written Home Exercises Provided: Patient instructed to cont prior HEP.  Exercises were reviewed and Cher was able to demonstrate them prior to the end of the session.  Cher demonstrated good  understanding of the education provided.     See EMR under Patient Instructions for exercises provided prior visit.    Assessment     Has not been here in a week.  Arrived with -5 resting extension.  Decided to resume E-Stim in propped extension with weight.  +2 after ES.  Continued working quad and LE strength.  Felt good at conclusion of tx.  Will continue with current plan.  Needs new POC next session and plan to try and get more auth.     Cher Is progressing well towards her goals.   Pt prognosis is Good.     Pt will continue to benefit from skilled outpatient physical therapy to address the deficits listed in " "the problem list box on initial evaluation, provide pt/family education and to maximize pt's level of independence in the home and community environment.     Pt's spiritual, cultural and educational needs considered and pt agreeable to plan of care and goals.     Anticipated barriers to physical therapy: none    Goals:  Short Term Goals: 6 weeks   Pt will be independent with HEP for continued progression beyond skilled therapy.--Compliant--MET  Pt will be able to ambulate I without deviation or quad avoidance gait on R.--minimal quad avoidance--PROGRESSING  Pt will be able to negotiate stairs reciprocally without pain or deviation on R.--unable--ONGOING  Pt will be able to perform an SLR on R without lag to demonstrate improved terminal knee extension control.--     Long Term Goals: 12 weeks   Pt will be able to perform a lateral step down from an 8" step without pain or limitation from R knee.  Pt will be able to return to running activities without pain or limitation from R knee.  Pt will be independent with strengthening activities in the gym on her own for safe discharge.   Pt will be able to perform a prone heel to butt without increased pain in R knee.  Pt will be able to perform a bodyweight squat without pain or limitation from R knee.  Plan      Plan of care Certification: 11/22/2023 to 02/02/24.     Outpatient Physical Therapy 2 times weekly for 10 weeks to include the following interventions: Electrical Stimulation Unattended, Manual Therapy, Neuromuscular Re-ed, Patient Education, Therapeutic Activities, and Therapeutic Exercise.     Jose Espinoza, PTA  1/30/2024        "

## 2024-02-05 RX ORDER — TRIAMCINOLONE ACETONIDE 1 MG/G
1 OINTMENT TOPICAL 2 TIMES DAILY
Qty: 80 G | Refills: 0 | Status: SHIPPED | OUTPATIENT
Start: 2024-02-05 | End: 2024-06-13 | Stop reason: SDUPTHER

## 2024-02-07 ENCOUNTER — CLINICAL SUPPORT (OUTPATIENT)
Dept: REHABILITATION | Facility: HOSPITAL | Age: 19
End: 2024-02-07
Payer: MEDICAID

## 2024-02-07 DIAGNOSIS — M62.81 QUADRICEPS WEAKNESS: ICD-10-CM

## 2024-02-07 DIAGNOSIS — Z98.890 S/P ACL REPAIR: Primary | ICD-10-CM

## 2024-02-07 PROCEDURE — 97530 THERAPEUTIC ACTIVITIES: CPT

## 2024-02-07 PROCEDURE — 97112 NEUROMUSCULAR REEDUCATION: CPT

## 2024-02-07 NOTE — PROGRESS NOTES
"  Physical Therapy Treatment Note     Name: Cher GARCIA [facility] Number: [id number]    Therapy Diagnosis:        Encounter Diagnoses   Name Primary?    Weakness Yes    Stiffness of right knee        Physician: Geovany Vega MD    Visit Date: 2/7/2024    Physician Orders: PT Eval and Treat  Medical Diagnosis from Referral: s/p R ACL-R w/ QT autograft and lateral meniscus repair  Evaluation Date: 11/22/2023  Authorization Period Expiration: 2/8/24  Plan of Care Expiration: 3/22/24  Visit # / Visits authorized: 15/21   FOTO: 56 at IE--FOTO DUE 1/25/24   54 at visit 8 (12/18/23)   59 at visit 12 (1/11/24)    PTA Visit #: 0/5    Time In: 4:51 PM  Time Out:   5:29 PM  Total Billable Time: 38  individual min billed     Precautions: Standard  Functional Level Prior to Evaluation: Chronic limitation from injury prior to surgery    Subjective     Pt reports: knee doing pretty good - only occasional mild pain    She was compliant with home exercise program.  Response to previous treatment: not much change  Functional change: ambulating independent w/out brace or assistive device     Pain: 0/10  Location: Diffuse R knee     Objective     0 deg resting extension and no lag w/ SLR..  130 deg flexion.     Treatment     Cher received therapeutic exercises to develop strength, endurance, ROM, flexibility, and posture for 15 individual minutes including:  Heel Drop stretch--1 min x 2  Calf Raise--3 x 10     Maria Isabel stretch--throughout session  Cher received the following manual therapy techniques: Joint mobilizations, Myofacial release, and Soft tissue Mobilization were applied to the: R knee for  0 minutes, including:    Cher participated in neuromuscular re-education activities to improve: Balance, Coordination, Kinesthetic, Sense, Proprioception, and Posture for   24 individual minutes. The following activities were included:  DL Leg Press 3 x 10 - 8 plates  SL Leg Press 3x10 - 5 plates   Fwd Step Downs 5x5 from 6" " "step  Prone HS Curls--2 plates--3 x 10  Seated Ext-1 plate--SL--3 x 5 - very challenging  SLRDL (wt touch)-- x 15 each hand  SLR--3 x 10    Forward step ups--8"--3 x 5  Wall sits--10 sec x 5  Split Squats w/ UE assist in rack 3x5 each  Heel with QS and shift--10 x 10 sec  Lateral eccentric lowering--6"--3 x 5    Therapeutic Activity for  14 individual minutes including:  Reverse lunges TRX x 10 each side  TRX squats to chair 3 x 10 - cueing to not shift weight to the left  Recheck of goals x 5 minutes    Cher received the following supervised modalities after being cleared for contradictions: NMES Electrical Stimulation:  Cher received NMES Electrical Stimulation to elicit muscle contraction of the R Quad. Pt received stimulation at a rate of 50 pps with symmetric current, ramp of 2 seconds with 10 second on time and 20 second off time. Performed for  0 min w/ 8 min in propped extension with MHP and 8# cuff weight followed by 4 min SLR.  Patient tolerated treatment well without any adverse effects.     Home Exercises Provided and Patient Education Provided     Education provided: Review of HEP and need to get knee fully extended    Written Home Exercises Provided: Patient instructed to cont prior HEP.  Exercises were reviewed and Cher was able to demonstrate them prior to the end of the session.  Cher demonstrated good  understanding of the education provided.     See EMR under Patient Instructions for exercises provided prior visit.    Assessment     Has not been able to attend physical therapy since 1/30 due to transportation issues.  She arrived with 0 resting extension and no quad lag with straight leg raise.  Did not utilize NMES today.  Continued working quad and LE strength. Added TRX squats to chair and reverse lunges with TRX system. Her progress toward her goals is documented below in red. Will update POC request additional visits to continue physical therapy. She would benefit from more " "therapy to continue working toward goals to increase her functional mobility and decrease her pain.    Cher Is progressing well towards her goals.   Pt prognosis is Good.     Pt will continue to benefit from skilled outpatient physical therapy to address the deficits listed in the problem list box on initial evaluation, provide pt/family education and to maximize pt's level of independence in the home and community environment.     Pt's spiritual, cultural and educational needs considered and pt agreeable to plan of care and goals.     Anticipated barriers to physical therapy: none    Goals:  Short Term Goals: 6 weeks   Pt will be independent with HEP for continued progression beyond skilled therapy.--Compliant--MET  Pt will be able to ambulate I without deviation or quad avoidance gait on R.--minimal quad avoidance--PROGRESSING  Pt will be able to negotiate stairs reciprocally without pain or deviation on R.--able to go up/down reciprocally but still has some slight decrease in eccentric control and pain when descending on right   Pt will be able to perform an SLR on R without lag to demonstrate improved terminal knee extension control.--MET     Long Term Goals: 12 weeks   Pt will be able to perform a lateral step down from an 8" step without pain or limitation from R knee. IMPROVING - able from 6" step  Pt will be able to return to running activities without pain or limitation from R knee. NOT MET  Pt will be independent with strengthening activities in the gym on her own for safe discharge. NOT MET  Pt will be able to perform a prone heel to butt without increased pain in R knee. Prone knee flexion on right lower extremity equal to left   Pt will be able to perform a bodyweight squat without pain or limitation from R knee. Able to do the squat but requires cueing for correct form as she lets knees go forward over toes and still shifts away from right lower extremity - also still has some mild pain with " this    Plan      Updated Certification Period: 2/8/2024 to 3/22/2024  Recommended Treatment Plan: 2 times per week for 6 weeks: Electrical Stimulation NMES, Gait Training, Manual Therapy, Moist Heat/ Ice, Neuromuscular Re-ed, Patient Education, Therapeutic Activities, and Therapeutic Exercise       ROSA ECHEVERRIA, PT  2/7/2024

## 2024-02-08 PROBLEM — M62.81 QUADRICEPS WEAKNESS: Status: ACTIVE | Noted: 2024-02-08

## 2024-02-08 NOTE — PLAN OF CARE
"Physical Therapy Updated Plan of Care     Name: Cher Ramos  Clinic Number: [id number]    Therapy Diagnosis:        Encounter Diagnoses   Name Primary?    Weakness Yes    Stiffness of right knee        Physician: Geovany Vega MD    Visit Date: 2/7/2024    Physician Orders: PT Eval and Treat  Medical Diagnosis from Referral: s/p R ACL-R w/ QT autograft and lateral meniscus repair  Evaluation Date: 11/22/2023  Authorization Period Expiration: 2/8/24  Plan of Care Expiration: 2/02/24  Visit # / Visits authorized: 15/21   FOTO: 56 at IE--FOTO DUE 1/25/24   54 at visit 8 (12/18/23)   59 at visit 12 (1/11/24)    See daily note for today's physical therapy treatment.    Reasons for Recertification of Therapy: Has not been able to attend physical therapy since 1/30 due to transportation issues.  She arrived with 0 resting extension and no quad lag with straight leg raise.  Did not utilize NMES today.  Continued working quad and LE strength. Added TRX squats to chair and reverse lunges with TRX system. Her progress toward her goals is documented below in red. Will update POC request additional visits to continue physical therapy. She would benefit from more therapy to continue working toward goals to increase her functional mobility and decrease her pain.    Short Term Goals: 6 weeks   Pt will be independent with HEP for continued progression beyond skilled therapy.--Compliant--MET  Pt will be able to ambulate I without deviation or quad avoidance gait on R.--minimal quad avoidance--PROGRESSING  Pt will be able to negotiate stairs reciprocally without pain or deviation on R.--able to go up/down reciprocally but still has some slight decrease in eccentric control and pain when descending on right   Pt will be able to perform an SLR on R without lag to demonstrate improved terminal knee extension control.--MET     Long Term Goals: 12 weeks   Pt will be able to perform a lateral step down from an 8" step without pain or " "limitation from R knee. IMPROVING - able from 6" step  Pt will be able to return to running activities without pain or limitation from R knee. NOT MET  Pt will be independent with strengthening activities in the gym on her own for safe discharge. NOT MET  Pt will be able to perform a prone heel to butt without increased pain in R knee. Prone knee flexion on right lower extremity equal to left   Pt will be able to perform a bodyweight squat without pain or limitation from R knee. Able to do the squat but requires cueing for correct form as she lets knees go forward over toes and still shifts away from right lower extremity - also still has some mild pain with this    Plan     Updated Certification Period: 2/8/2024 to 3/22/2024  Recommended Treatment Plan: 2 times per week for 6 weeks: Electrical Stimulation NMES, Gait Training, Manual Therapy, Moist Heat/ Ice, Neuromuscular Re-ed, Patient Education, Therapeutic Activities, and Therapeutic Exercise      ROSA ECHEVERRIA, PT  2/8/2024      I CERTIFY THE NEED FOR THESE SERVICES FURNISHED UNDER THIS PLAN OF TREATMENT AND WHILE UNDER MY CARE.    Physician's comments:      Physician's Signature: ___________________________________________________   "

## 2024-02-20 ENCOUNTER — CLINICAL SUPPORT (OUTPATIENT)
Dept: REHABILITATION | Facility: HOSPITAL | Age: 19
End: 2024-02-20
Payer: MEDICAID

## 2024-02-20 DIAGNOSIS — M62.81 QUADRICEPS WEAKNESS: ICD-10-CM

## 2024-02-20 DIAGNOSIS — Z98.890 S/P ACL REPAIR: Primary | ICD-10-CM

## 2024-02-20 PROCEDURE — 97112 NEUROMUSCULAR REEDUCATION: CPT | Mod: CQ

## 2024-02-20 PROCEDURE — 97530 THERAPEUTIC ACTIVITIES: CPT | Mod: CQ

## 2024-03-01 ENCOUNTER — CLINICAL SUPPORT (OUTPATIENT)
Dept: REHABILITATION | Facility: HOSPITAL | Age: 19
End: 2024-03-01
Payer: MEDICAID

## 2024-03-01 DIAGNOSIS — M62.81 QUADRICEPS WEAKNESS: ICD-10-CM

## 2024-03-01 DIAGNOSIS — Z98.890 S/P ACL REPAIR: Primary | ICD-10-CM

## 2024-03-01 PROCEDURE — 97110 THERAPEUTIC EXERCISES: CPT | Mod: CQ

## 2024-03-01 PROCEDURE — 97530 THERAPEUTIC ACTIVITIES: CPT | Mod: CQ

## 2024-03-01 PROCEDURE — 97112 NEUROMUSCULAR REEDUCATION: CPT | Mod: CQ

## 2024-03-01 NOTE — PROGRESS NOTES
Physical Therapy Treatment Note     Name: Cher GARCIA [facility] Number: [id number]    Therapy Diagnosis:        Encounter Diagnoses   Name Primary?    Weakness Yes    Stiffness of right knee        Physician: Geovany Vega MD    Visit Date: 3/1/2024    Physician Orders: PT Eval and Treat  Medical Diagnosis from Referral: s/p R ACL-R w/ QT autograft and lateral meniscus repair  Evaluation Date: 11/22/2023  Authorization Period Expiration: 2/8/24  Plan of Care Expiration: 3/22/24  Visit # / Visits authorized: 17/32   FOTO: 56 at IE--FOTO DUE 1/25/24   54 at visit 8 (12/18/23)   59 at visit 12 (1/11/24)    PTA Visit #: 2/5    Time In: 10:15 AM  Time Out:  10:55  AM  Total Billable Time: 40 individual min billed     Precautions: Standard  Functional Level Prior to Evaluation: Chronic limitation from injury prior to surgery    Subjective     Pt reports: doing pretty good this morning. No new complaints. Walked a mile the other day.     She was compliant with home exercise program.  Response to previous treatment: not much change  Functional change: ambulating independent w/out brace or assistive device     Pain: 0/10  Location: Diffuse R knee     Objective     0 deg resting extension and no lag w/ SLR..  130 deg flexion.       Treatment     Cher received therapeutic exercises to develop strength, endurance, ROM, flexibility, and posture for 10 individual minutes including:    HS Curls--5 plates--3 x 10  Wall Ball Squats 2 x 10  Slant Board Stretch [id number]    (Not Today)  Heel Drop stretch--1 min x 2  Calf Raise--3 x 10   Maria Isabel stretch--throughout session  HS stretch with strap--10 x 10 sec  Supine ball flexion rolls [id number]  Prone Quad Sets [id number]  Prone quad stretch--3 x 30 sec  Cher received the following manual therapy techniques: Joint mobilizations, Myofacial release, and Soft tissue Mobilization were applied to the: R knee for  0 minutes, including:    Cher participated in neuromuscular  "re-education activities to improve: Balance, Coordination, Kinesthetic, Sense, Proprioception, and Posture for   12 individual minutes. The following activities were included:    Fwd Step Downs 5x5 from 6" step  Seated Ext-2 plate--SL--3 x 5 - very challenging   Split Squats w/ UE assist in rack 3x5 each  Step off sticks with 8in step x 10 each (B)    (Not Today)  Forward step ups--8"--3 x 5  SLR--3 x 10  \Lateral eccentric lowering--6"--3 x 5  Wall sits--10 sec x 5  Heel with QS and shift--10 x 10 sec  Prone HS Curls--2 plates--3 x 10  SLRDL (wt touch)-- x 15 each hand  DL Leg Press 3 x 10 - 8 plates  SL Leg Press 3x10 - 5 plates   Long Bridging [id number]  Planks [id number]    RFESS--3 x 5 --with balance assist    Therapeutic Activity for  18 individual minutes including:    Upright Bike x 8 minutes  Knee hugs x 1 lap   Sweep the grounds - active hamstring x 1 lap  POGO hops - fwd / lateral x 1 lap each   Side lunge side lunge squat/pivot x 1 lap    (Not Today)  Reverse lunges TRX x 10 each side  TRX squats to chair 3 x 10 - cueing to not shift weight to the left    Cher received the following supervised modalities after being cleared for contradictions: NMES Electrical Stimulation:  Cher received NMES Electrical Stimulation to elicit muscle contraction of the R Quad. Pt received stimulation at a rate of 50 pps with symmetric current, ramp of 2 seconds with 10 second on time and 20 second off time. Performed for  0 min w/ 8 min in propped extension with MHP and 8# cuff weight followed by 4 min SLR.  Patient tolerated treatment well without any adverse effects.     Home Exercises Provided and Patient Education Provided     Education provided: Review of HEP and need to get knee fully extended    Written Home Exercises Provided: Patient instructed to cont prior HEP.  Exercises were reviewed and Cher was able to demonstrate them prior to the end of the session.  Cher demonstrated good  understanding of the " "education provided.     See EMR under Patient Instructions for exercises provided prior visit.    Assessment     Still working on strengthening the right lower extremity at this point. Introduced some plyos into program today. Pogo hops fwd and lateral and also lateral lunging with squat pivot. Cued Patient to focus on quad strengthening here with form correction.  Strength in R quad is too weak to think about running at this point.  Educated on results and need to get in PT to increase strength to return to running.  Will continue strengthening and plyo's next session.     Cher Is progressing well towards her goals.   Pt prognosis is Good.     Pt will continue to benefit from skilled outpatient physical therapy to address the deficits listed in the problem list box on initial evaluation, provide pt/family education and to maximize pt's level of independence in the home and community environment.     Pt's spiritual, cultural and educational needs considered and pt agreeable to plan of care and goals.     Anticipated barriers to physical therapy: none    Goals:  Short Term Goals: 6 weeks   Pt will be independent with HEP for continued progression beyond skilled therapy.--Compliant--MET  Pt will be able to ambulate I without deviation or quad avoidance gait on R.--minimal quad avoidance--PROGRESSING  Pt will be able to negotiate stairs reciprocally without pain or deviation on R.--able to go up/down reciprocally but still has some slight decrease in eccentric control and pain when descending on right   Pt will be able to perform an SLR on R without lag to demonstrate improved terminal knee extension control.--MET     Long Term Goals: 12 weeks   Pt will be able to perform a lateral step down from an 8" step without pain or limitation from R knee. IMPROVING - able from 6" step  Pt will be able to return to running activities without pain or limitation from R knee. NOT MET  Pt will be independent with strengthening " activities in the gym on her own for safe discharge. NOT MET  Pt will be able to perform a prone heel to butt without increased pain in R knee. Prone knee flexion on right lower extremity equal to left   Pt will be able to perform a bodyweight squat without pain or limitation from R knee. Able to do the squat but requires cueing for correct form as she lets knees go forward over toes and still shifts away from right lower extremity - also still has some mild pain with this    Plan      Updated Certification Period: 2/8/2024 to 3/22/2024  Recommended Treatment Plan: 2 times per week for 6 weeks: Electrical Stimulation NMES, Gait Training, Manual Therapy, Moist Heat/ Ice, Neuromuscular Re-ed, Patient Education, Therapeutic Activities, and Therapeutic Exercise       Seng Henao, PTA  3/1/2024

## 2024-03-08 ENCOUNTER — CLINICAL SUPPORT (OUTPATIENT)
Dept: REHABILITATION | Facility: HOSPITAL | Age: 19
End: 2024-03-08
Payer: MEDICAID

## 2024-03-08 DIAGNOSIS — M62.81 QUADRICEPS WEAKNESS: ICD-10-CM

## 2024-03-08 DIAGNOSIS — Z98.890 S/P ACL REPAIR: Primary | ICD-10-CM

## 2024-03-08 PROCEDURE — 97530 THERAPEUTIC ACTIVITIES: CPT | Mod: CQ

## 2024-03-08 PROCEDURE — 97112 NEUROMUSCULAR REEDUCATION: CPT | Mod: CQ

## 2024-03-08 NOTE — PROGRESS NOTES
Physical Therapy Treatment Note     Name: Cher GARCIA [facility] Number: [id number]    Therapy Diagnosis:        Encounter Diagnoses   Name Primary?    Weakness Yes    Stiffness of right knee        Physician: Geovany Vega MD    Visit Date: 3/1/2024    Physician Orders: PT Eval and Treat  Medical Diagnosis from Referral: s/p R ACL-R w/ QT autograft and lateral meniscus repair  Evaluation Date: 11/22/2023  Authorization Period Expiration: 2/8/24  Plan of Care Expiration: 3/22/24  Visit # / Visits authorized: 18/32   FOTO: 56 at IE   54 at visit 8 (12/18/23)   59 at visit 12 (1/11/24)    PTA Visit #: 3/5    Time In: 9:35 AM  Time Out:    10:48 AM  Total Billable Time:  25 individual min billed (48 non billed minutes)    Precautions: Standard  Functional Level Prior to Evaluation: Chronic limitation from injury prior to surgery    Subjective     Pt reports: doing pretty good this morning. No new complaints.    She was compliant with home exercise program.  Response to previous treatment: not much change  Functional change: ambulating independent w/out brace or assistive device     Pain: 0/10  Location: Diffuse R knee     Objective     0 deg resting extension and no lag w/ SLR..  130 deg flexion.       Treatment     Cher received therapeutic exercises to develop strength, endurance, ROM, flexibility, and posture for 0 individual minutes including:    HS Curls--5 plates--3 x 10  Wall Ball Squats 2 x 10  Slant Board Stretch [id number]    (Not Today)  Heel Drop stretch--1 min x 2  Calf Raise--3 x 10   Maria Isabel stretch--throughout session  HS stretch with strap--10 x 10 sec  Supine ball flexion rolls [id number]  Prone Quad Sets [id number]  Prone quad stretch--3 x 30 sec  Cher received the following manual therapy techniques: Joint mobilizations, Myofacial release, and Soft tissue Mobilization were applied to the: R knee for  0 minutes, including:    Cher participated in neuromuscular re-education activities to  "improve: Balance, Coordination, Kinesthetic, Sense, Proprioception, and Posture for   12 individual minutes. The following activities were included:    Fwd Step Downs 5x5 from 6" step  Seated Ext-2 plate--SL--3 x 5 - very challenging   Split Squats w/ UE assist in rack 3x5 each  DL Leg Press 3 x 10 - 8 plates  SL Leg Press 3x10 - 5 plates   Lateral eccentric lowering--4"--3 x 5  Forward eccentric lowering--4"--3 x 5    (Not Today)  Forward step ups--8"--3 x 5  SLR--3 x 10  Wall sits--10 sec x 5  Heel with QS and shift--10 x 10 sec  RFESS--3 x 5 --with balance assist    Therapeutic Activity for  13 individual minutes including:  Upright Bike x 8 minutes  Knee hugs x 1 lap   Sweep the grounds - active hamstring x 1 lap  POGO hops - fwd / lateral x 1 lap each   Side lunge side lunge squat/pivot x 1 lap  Step off sticks with 8in step x 10 each (B)  Depth drops--5x    (Not Today)  Reverse lunges TRX x 10 each side  TRX squats to chair 3 x 10 - cueing to not shift weight to the left    Cher received the following supervised modalities after being cleared for contradictions: NMES Electrical Stimulation:  Cher received NMES Electrical Stimulation to elicit muscle contraction of the R Quad. Pt received stimulation at a rate of 50 pps with symmetric current, ramp of 2 seconds with 10 second on time and 20 second off time. Performed for  0 min w/ 8 min in propped extension with MHP and 8# cuff weight followed by 4 min SLR.  Patient tolerated treatment well without any adverse effects.     Home Exercises Provided and Patient Education Provided     Education provided: Review of HEP and need to get knee fully extended    Written Home Exercises Provided: Patient instructed to cont prior HEP.  Exercises were reviewed and Cher was able to demonstrate them prior to the end of the session.  Cher demonstrated good  understanding of the education provided.     See EMR under Patient Instructions for exercises provided " "prior visit.    Assessment     Patient has only showed to 3 appointments in last month.  Still working on strengthening the right lower extremity at this point. Continued plyos today. L knee buckled with depth drops and we discontinued these.  She reports that her L knee jazmine on her from time to time.  Cued Patient to focus on quad strengthening here with form correction.  Strength in R quad is too weak to think about running at this point.... LLE not much better..  Will continue strengthening and plyo's next session.     Cher Is progressing well towards her goals.   Pt prognosis is Good.     Pt will continue to benefit from skilled outpatient physical therapy to address the deficits listed in the problem list box on initial evaluation, provide pt/family education and to maximize pt's level of independence in the home and community environment.     Pt's spiritual, cultural and educational needs considered and pt agreeable to plan of care and goals.     Anticipated barriers to physical therapy: none    Goals:  Short Term Goals: 6 weeks   Pt will be independent with HEP for continued progression beyond skilled therapy.--Compliant--MET  Pt will be able to ambulate I without deviation or quad avoidance gait on R.--minimal quad avoidance--PROGRESSING  Pt will be able to negotiate stairs reciprocally without pain or deviation on R.--able to go up/down reciprocally but still has some slight decrease in eccentric control and pain when descending on right   Pt will be able to perform an SLR on R without lag to demonstrate improved terminal knee extension control.--MET     Long Term Goals: 12 weeks   Pt will be able to perform a lateral step down from an 8" step without pain or limitation from R knee. IMPROVING - able from 6" step  Pt will be able to return to running activities without pain or limitation from R knee. NOT MET  Pt will be independent with strengthening activities in the gym on her own for safe " discharge. NOT MET  Pt will be able to perform a prone heel to butt without increased pain in R knee. Prone knee flexion on right lower extremity equal to left   Pt will be able to perform a bodyweight squat without pain or limitation from R knee. Able to do the squat but requires cueing for correct form as she lets knees go forward over toes and still shifts away from right lower extremity - also still has some mild pain with this    Plan      Updated Certification Period: 2/8/2024 to 3/22/2024  Recommended Treatment Plan: 2 times per week for 6 weeks: Electrical Stimulation NMES, Gait Training, Manual Therapy, Moist Heat/ Ice, Neuromuscular Re-ed, Patient Education, Therapeutic Activities, and Therapeutic Exercise       Seng Henao, PTA  3/1/2024

## 2024-03-13 ENCOUNTER — CLINICAL SUPPORT (OUTPATIENT)
Dept: REHABILITATION | Facility: HOSPITAL | Age: 19
End: 2024-03-13
Payer: MEDICAID

## 2024-03-13 DIAGNOSIS — M62.81 QUADRICEPS WEAKNESS: ICD-10-CM

## 2024-03-13 DIAGNOSIS — Z98.890 S/P ACL REPAIR: Primary | ICD-10-CM

## 2024-03-13 PROCEDURE — 97530 THERAPEUTIC ACTIVITIES: CPT

## 2024-03-13 PROCEDURE — 97112 NEUROMUSCULAR REEDUCATION: CPT

## 2024-03-13 PROCEDURE — 97110 THERAPEUTIC EXERCISES: CPT

## 2024-03-13 NOTE — PROGRESS NOTES
Physical Therapy Treatment Note     Name: Cher GARCIA [facility] Number: [id number]    Therapy Diagnosis:        Encounter Diagnoses   Name Primary?    Weakness Yes    Stiffness of right knee        Physician: Geovany Vega MD    Visit Date: 3/13/2024    Physician Orders: PT Eval and Treat  Medical Diagnosis from Referral: s/p R ACL-R w/ QT autograft and lateral meniscus repair  Evaluation Date: 11/22/2023  Authorization Period Expiration: 2/8/24  Plan of Care Expiration: 3/22/24  Visit # / Visits authorized: 19/32   FOTO: 56 at IE   54 at visit 8 (12/18/23)   59 at visit 12 (1/11/24)    PTA Visit #: 0/5    Time In: 10:00 AM  Time Out: 11:00 AM  Total Billable Time:  40 individual min billed (20 non billed minutes)    Precautions: Standard  Functional Level Prior to Evaluation: Chronic limitation from injury prior to surgery    Subjective     Pt reports: doing pretty good this morning. No new complaints.    She was compliant with home exercise program.  Response to previous treatment: not much change  Functional change: ambulating independent w/out brace or assistive device     Pain: 0/10  Location: Diffuse R knee     Objective     0 deg resting extension and no lag w/ SLR..  130 deg flexion.       Treatment     Cher received therapeutic exercises to develop strength, endurance, ROM, flexibility, and posture for 9 individual minutes including:    Prone HS Curls--3 plates--3 x 10  Wall Ball Squats 2 x 10  Slant Board Stretch [id number]    (Not Today)  Heel Drop stretch--1 min x 2  Calf Raise--3 x 10   Maira Isabel stretch--throughout session  HS stretch with strap--10 x 10 sec  Supine ball flexion rolls [id number]  Prone Quad Sets [id number]  Prone quad stretch--3 x 30 sec  Cher received the following manual therapy techniques: Joint mobilizations, Myofacial release, and Soft tissue Mobilization were applied to the: R knee for  0 minutes, including:    Cher participated in neuromuscular re-education activities to  "improve: Balance, Coordination, Kinesthetic, Sense, Proprioception, and Posture for 18 individual minutes. The following activities were included:  Seated Ext-2 plate--SL--3 x 5 - very challenging   Split Squats w/TRX 3x5 each  DL Leg Press 3 x 10 - 8 plates  SL Leg Press 3x10 - 5 plates   Lateral eccentric lowering--6"--3 x 10  Forward eccentric lowering--6"--3 x 10  Squats on slant board w/ blue band 3 x 10    (Not Today)  Forward step ups--8"--3 x 5  SLR--3 x 10  Wall sits--10 sec x 5  Heel with QS and shift--10 x 10 sec  RFESS--3 x 5 --with balance assist    Therapeutic Activity for 13 individual minutes including:  Elliptical x 5 minutes  Knee hugs x 2 lap   Sweep the grounds - active hamstring   POGO hops - fwd / lateral    Side lunge side lunge squat/pivot x 1 lap  Step off sticks with 8 in step x 10 each (B)  Depth drops--  Single leg TRX squats 3 x 10    (Not Today)  Reverse lunges TRX x 10 each side  TRX squats to chair 3 x 10 - cueing to not shift weight to the left    Cher received the following supervised modalities after being cleared for contradictions: NMES Electrical Stimulation:  Cher received NMES Electrical Stimulation to elicit muscle contraction of the R Quad. Pt received stimulation at a rate of 50 pps with symmetric current, ramp of 2 seconds with 10 second on time and 20 second off time. Performed for  0 min w/ 8 min in propped extension with MHP and 8# cuff weight followed by 4 min SLR.  Patient tolerated treatment well without any adverse effects.     Home Exercises Provided and Patient Education Provided     Education provided: Review of HEP and need to get knee fully extended    Written Home Exercises Provided: Patient instructed to cont prior HEP.  Exercises were reviewed and Cher was able to demonstrate them prior to the end of the session.  Cher demonstrated good  understanding of the education provided.     See EMR under Patient Instructions for exercises provided " "prior visit.    Assessment     Patient is making slower progress due to lack of consistency with therapy. She reports difficulty with transportation. Depth drops not reintroduced today due to knee buckling last visit with these. Working on single leg activities for unilateral strengthening. Will continue to progress strengthening and plyo's as able.     Cher Is progressing well towards her goals.   Pt prognosis is Good.     Pt will continue to benefit from skilled outpatient physical therapy to address the deficits listed in the problem list box on initial evaluation, provide pt/family education and to maximize pt's level of independence in the home and community environment.     Pt's spiritual, cultural and educational needs considered and pt agreeable to plan of care and goals.     Anticipated barriers to physical therapy: none    Goals:  Short Term Goals: 6 weeks   Pt will be independent with HEP for continued progression beyond skilled therapy.--Compliant--MET  Pt will be able to ambulate I without deviation or quad avoidance gait on R.--minimal quad avoidance--PROGRESSING  Pt will be able to negotiate stairs reciprocally without pain or deviation on R.--able to go up/down reciprocally but still has some slight decrease in eccentric control and pain when descending on right   Pt will be able to perform an SLR on R without lag to demonstrate improved terminal knee extension control.--MET     Long Term Goals: 12 weeks   Pt will be able to perform a lateral step down from an 8" step without pain or limitation from R knee. IMPROVING - able from 6" step  Pt will be able to return to running activities without pain or limitation from R knee. NOT MET  Pt will be independent with strengthening activities in the gym on her own for safe discharge. NOT MET  Pt will be able to perform a prone heel to butt without increased pain in R knee. Prone knee flexion on right lower extremity equal to left   Pt will be able to " perform a bodyweight squat without pain or limitation from R knee. Able to do the squat but requires cueing for correct form as she lets knees go forward over toes and still shifts away from right lower extremity - also still has some mild pain with this    Plan      Updated Certification Period: 2/8/2024 to 3/22/2024  Recommended Treatment Plan: 2 times per week for 6 weeks: Electrical Stimulation NMES, Gait Training, Manual Therapy, Moist Heat/ Ice, Neuromuscular Re-ed, Patient Education, Therapeutic Activities, and Therapeutic Exercise       ROSA ECHEVERRIA, PT  3/13/2024

## 2024-03-19 ENCOUNTER — CLINICAL SUPPORT (OUTPATIENT)
Dept: REHABILITATION | Facility: HOSPITAL | Age: 19
End: 2024-03-19
Payer: MEDICAID

## 2024-03-19 DIAGNOSIS — Z98.890 S/P ACL REPAIR: Primary | ICD-10-CM

## 2024-03-19 DIAGNOSIS — M62.81 QUADRICEPS WEAKNESS: ICD-10-CM

## 2024-03-19 PROCEDURE — 97110 THERAPEUTIC EXERCISES: CPT | Mod: CQ

## 2024-03-19 PROCEDURE — 97530 THERAPEUTIC ACTIVITIES: CPT | Mod: CQ

## 2024-03-19 PROCEDURE — 97112 NEUROMUSCULAR REEDUCATION: CPT | Mod: CQ

## 2024-03-19 NOTE — PROGRESS NOTES
Physical Therapy Treatment Note     Name: Cher GARCIA [facility] Number: [id number]    Therapy Diagnosis:        Encounter Diagnoses   Name Primary?    Weakness Yes    Stiffness of right knee        Physician: Geovany Vega MD    Visit Date: 3/13/2024    Physician Orders: PT Eval and Treat  Medical Diagnosis from Referral: s/p R ACL-R w/ QT autograft and lateral meniscus repair  Evaluation Date: 11/22/2023  Authorization Period Expiration: 2/8/24  Plan of Care Expiration: 3/22/24  Visit # / Visits authorized: 20/32   FOTO: 56 at IE   54 at visit 8 (12/18/23)   59 at visit 12 (1/11/24)    PTA Visit #: 1/5    Time In: 11:00 AM  Time Out:     11:55 AM  Total Billable Time:   55 individual min billed    Precautions: Standard  Functional Level Prior to Evaluation: Chronic limitation from injury prior to surgery    Subjective     Pt reports: doing pretty good this morning. No new complaints.    She was compliant with home exercise program.  Response to previous treatment: not much change  Functional change: ambulating independent w/out brace or assistive device     Pain: 0/10  Location: Diffuse R knee     Objective     0 deg resting extension and no lag w/ SLR..  130 deg flexion.       Treatment     Cher received therapeutic exercises to develop strength, endurance, ROM, flexibility, and posture for 15 individual minutes including:  Prone HS Curls--3 plates--3 x 10  Slant Board Stretch [id number]  Heel Drop stretch--1 min x 2  Calf Raise--3 x 10 --10#  Maria Isabel stretch--throughout session    (Not Today)  Wall Ball Squats 2 x 10  HS stretch with strap--10 x 10 sec  Supine ball flexion rolls [id number]  Prone Quad Sets [id number]  Prone quad stretch--3 x 30 sec  Cher received the following manual therapy techniques: Joint mobilizations, Myofacial release, and Soft tissue Mobilization were applied to the: R knee for  0 minutes, including:    Cher participated in neuromuscular re-education activities to improve:  "Balance, Coordination, Kinesthetic, Sense, Proprioception, and Posture for   12 individual minutes. The following activities were included:  Seated Ext-2 plate--SL--3 x 5 - very challenging   Split Squats w/TRX 3x5 each  Lateral eccentric lowering--6"--3 x 10  Forward eccentric lowering--6"--3 x 10  Squats on slant board w/ blue band--3 x 5--10#  SL squat--TRX straps-3 x 5--    (Not Today)  Forward step ups--8"--3 x 5  SLR--3 x 10  Wall sits--10 sec x 5  Heel with QS and shift--10 x 10 sec  RFESS--3 x 5 --with balance assist  DL Leg Press 3 x 10 - 8 plates  SL Leg Press 3x10 - 5 plates     Therapeutic Activity for 23 individual minutes including:  Elliptical x 5 minutes  Knee hugs x 2 lap   Sweep the grounds - active hamstring x 1 lap  POGO hops - fwd / lateral x 1 lap each   Side lunge side lunge squat/pivot x 1 lap  Step off sticks with 8 in step x 10 each (B)  CMJ from low mat--15x  Single leg TRX squats 3 x 10    (Not Today)  Depth drops--  Reverse lunges TRX x 10 each side  TRX squats to chair 3 x 10 - cueing to not shift weight to the left    Cher received the following supervised modalities after being cleared for contradictions: NMES Electrical Stimulation:  Cher received NMES Electrical Stimulation to elicit muscle contraction of the R Quad. Pt received stimulation at a rate of 50 pps with symmetric current, ramp of 2 seconds with 10 second on time and 20 second off time. Performed for  0 min w/ 8 min in propped extension with MHP and 8# cuff weight followed by 4 min SLR.  Patient tolerated treatment well without any adverse effects.     Home Exercises Provided and Patient Education Provided     Education provided: Review of HEP and need to get knee fully extended    Written Home Exercises Provided: Patient instructed to cont prior HEP.  Exercises were reviewed and Cher was able to demonstrate them prior to the end of the session.  Cher demonstrated good  understanding of the education " "provided.     See EMR under Patient Instructions for exercises provided prior visit.    Assessment     Patient has been a little better with attendance.  Still working on strengthening the right lower extremity at this point. Continued plyos today. Added seated CMJ without issue.  Cued Patient to focus on quad strengthening here with form correction.  Strength in R quad is too weak to think about running at this point.... LLE not much better..  Will continue strengthening and plyo's next session. Cont POC    Cher Is progressing well towards her goals.   Pt prognosis is Good.     Pt will continue to benefit from skilled outpatient physical therapy to address the deficits listed in the problem list box on initial evaluation, provide pt/family education and to maximize pt's level of independence in the home and community environment.     Pt's spiritual, cultural and educational needs considered and pt agreeable to plan of care and goals.     Anticipated barriers to physical therapy: none    Goals:  Short Term Goals: 6 weeks   Pt will be independent with HEP for continued progression beyond skilled therapy.--Compliant--MET  Pt will be able to ambulate I without deviation or quad avoidance gait on R.--minimal quad avoidance--PROGRESSING  Pt will be able to negotiate stairs reciprocally without pain or deviation on R.--able to go up/down reciprocally but still has some slight decrease in eccentric control and pain when descending on right   Pt will be able to perform an SLR on R without lag to demonstrate improved terminal knee extension control.--MET     Long Term Goals: 12 weeks   Pt will be able to perform a lateral step down from an 8" step without pain or limitation from R knee. IMPROVING - able from 6" step  Pt will be able to return to running activities without pain or limitation from R knee. NOT MET  Pt will be independent with strengthening activities in the gym on her own for safe discharge. NOT MET  Pt " will be able to perform a prone heel to butt without increased pain in R knee. Prone knee flexion on right lower extremity equal to left   Pt will be able to perform a bodyweight squat without pain or limitation from R knee. Able to do the squat but requires cueing for correct form as she lets knees go forward over toes and still shifts away from right lower extremity - also still has some mild pain with this    Plan      Updated Certification Period: 2/8/2024 to 3/22/2024  Recommended Treatment Plan: 2 times per week for 6 weeks: Electrical Stimulation NMES, Gait Training, Manual Therapy, Moist Heat/ Ice, Neuromuscular Re-ed, Patient Education, Therapeutic Activities, and Therapeutic Exercise       ROSA ECHEVERRIA, PT  3/13/2024

## 2024-03-22 ENCOUNTER — CLINICAL SUPPORT (OUTPATIENT)
Dept: REHABILITATION | Facility: HOSPITAL | Age: 19
End: 2024-03-22
Payer: MEDICAID

## 2024-03-22 DIAGNOSIS — M62.81 QUADRICEPS WEAKNESS: ICD-10-CM

## 2024-03-22 DIAGNOSIS — Z98.890 S/P ACL REPAIR: Primary | ICD-10-CM

## 2024-03-22 PROCEDURE — 97112 NEUROMUSCULAR REEDUCATION: CPT | Mod: CQ

## 2024-03-22 PROCEDURE — 97530 THERAPEUTIC ACTIVITIES: CPT | Mod: CQ

## 2024-03-22 NOTE — PROGRESS NOTES
Physical Therapy Treatment Note     Name: Cher GARCIA [facility] Number: [id number]    Therapy Diagnosis:        Encounter Diagnoses   Name Primary?    Weakness Yes    Stiffness of right knee        Physician: Geovany Vega MD    Visit Date: 3/22/2024    Physician Orders: PT Eval and Treat  Medical Diagnosis from Referral: s/p R ACL-R w/ QT autograft and lateral meniscus repair  Evaluation Date: 11/22/2023  Authorization Period Expiration: 3/31/24  Plan of Care Expiration: 3/29/24  Visit # / Visits authorized: 21/27   FOTO: 56 at IE   54 at visit 8 (12/18/23)   59 at visit 12 (1/11/24)   86 at visit 21 (3/22/24)    PTA Visit #: 2/5    Time In: 9:49 AM  Time Out:      10:38 AM  Total Billable Time:   23  individual min (26 non billed)    Precautions: Standard  Functional Level Prior to Evaluation: Chronic limitation from injury prior to surgery    Subjective     Pt reports: doing pretty good this morning. No new complaints.    She was compliant with home exercise program.  Response to previous treatment: not much change  Functional change: ambulating independent w/out brace or assistive device     Pain: 0/10  Location: Diffuse R knee     Objective     Observation : Arrived Full WB without brace or deficit       Range of Motion/Strength :         Left Extremity                                                                        Right Extremity   AROM Strength  Location  AROM   Strength   145  5/5   Knee    Flexion (140)  130 4/5    +5  5/5                Extension (0) +5 4/5         Functional Impairments :    Ambulates full WB without brace  and no deficit  Able to negotiate stairs with reciprocal pattern and no handrails  Able to perform bodyweight squat without pain  Able to perform functional ADL at this time.       Treatment     Cher received therapeutic exercises to develop strength, endurance, ROM, flexibility, and posture for 0 individual minutes including:  Prone HS Curls--3 plates--3 x 10  Slant  "Board Stretch [id number]  Heel Drop stretch--1 min x 2  Calf Raise--3 x 10 --10#  Maria Isabel stretch--throughout session    (Not Today)  Wall Ball Squats 2 x 10  HS stretch with strap--10 x 10 sec  Supine ball flexion rolls [id number]  Prone Quad Sets [id number]  Prone quad stretch--3 x 30 sec  Cher received the following manual therapy techniques: Joint mobilizations, Myofacial release, and Soft tissue Mobilization were applied to the: R knee for  0 minutes, including:    Cher participated in neuromuscular re-education activities to improve: Balance, Coordination, Kinesthetic, Sense, Proprioception, and Posture for   15 individual minutes. The following activities were included:  Seated Ext-2 plate--SL--3 x 5 - very challenging   Split Squats w/TRX 3x5 each  Lateral eccentric lowering--6"--3 x 10  Forward eccentric lowering--6"--3 x 10  Squats on slant board w/ blue band--3 x 5--10#  SL squat--TRX straps-3 x 5  Walking lunges-2 laps  Step ups with SLS--12"--10# dbs each hand  Heel elevated squats--15# kb--3 x 10    (Not Today)  Forward step ups--8"--3 x 5  SLR--3 x 10  Wall sits--10 sec x 5  Heel with QS and shift--10 x 10 sec.  DL Leg Press 3 x 10 - 8 plates  SL Leg Press 3x10 - 5 plates     Therapeutic Activity for 8 individual minutes including:  Elliptical x 5 minutes  Knee hugs x 2 lap   Sweep the grounds - active hamstring x 1 lap  Side lunge side lunge squat/pivot x 1 lap      (Not Today)  POGO hops - fwd / lateral x 1 lap each   Depth drops--  Step off sticks with 8 in step x 10 each (B)  CMJ from low mat--15x  Reverse lunges TRX x 10 each side  TRX squats to chair 3 x 10 - cueing to not shift weight to the left    Cher received the following supervised modalities after being cleared for contradictions: NMES Electrical Stimulation:  Cher received NMES Electrical Stimulation to elicit muscle contraction of the R Quad. Pt received stimulation at a rate of 50 pps with symmetric current, ramp of 2 seconds " with 10 second on time and 20 second off time. Performed for  0 min w/ 8 min in propped extension with MHP and 8# cuff weight followed by 4 min SLR.  Patient tolerated treatment well without any adverse effects.     Home Exercises Provided and Patient Education Provided     Education provided: Review of HEP and need to get knee fully extended    Written Home Exercises Provided: Patient instructed to cont prior HEP.  Exercises were reviewed and Cher was able to demonstrate them prior to the end of the session.  Cher demonstrated good  understanding of the education provided.     See EMR under Patient Instructions for exercises provided prior visit.    Assessment     Patient has been a little better with attendance.  Still working on strengthening the right lower extremity at this point. Patient reports she is not worried about running and jumping any more. Worked on strengthening exercises that she can continue in gym.  Will extend POC 1 week and DC to gym.    Cher Is progressing well towards her goals.   Pt prognosis is Good.     Pt will continue to benefit from skilled outpatient physical therapy to address the deficits listed in the problem list box on initial evaluation, provide pt/family education and to maximize pt's level of independence in the home and community environment.     Pt's spiritual, cultural and educational needs considered and pt agreeable to plan of care and goals.     Anticipated barriers to physical therapy: none    Goals:  Short Term Goals: 6 weeks   Pt will be independent with HEP for continued progression beyond skilled therapy.--Compliant--MET  Pt will be able to ambulate I without deviation or quad avoidance gait on R.--No deviation--MET  Pt will be able to negotiate stairs reciprocally without pain or deviation on R.--able to go up/down reciprocally without rails--MET   Pt will be able to perform an SLR on R without lag to demonstrate improved terminal knee extension  "control.--MET     Long Term Goals: 12 weeks   Pt will be able to perform a lateral step down from an 8" step without pain or limitation from R knee. --Able to do 6"--MET  Pt will be able to return to running activities without pain or limitation from R knee.--DEFERRED  Pt will be independent with strengthening activities in the gym on her own for safe discharge. --Going to gym--MET  Pt will be able to perform a prone heel to butt without increased pain in R knee. Prone knee flexion on right lower extremity equal to left--MET  Pt will be able to perform a bodyweight squat without pain or limitation from R knee. Able to do the squat but requires cueing for correction of shift--PROGRESSING    Plan      Updated Certification Period: 3/22/2024 to 3/29/34  Recommended Treatment Plan: 1 times per week for 1 week: Electrical Stimulation NMES, Gait Training, Manual Therapy, Moist Heat/ Ice, Neuromuscular Re-ed, Patient Education, Therapeutic Activities, and Therapeutic Exercise       Jose Espinoza, PTA  3/22/2024          "

## 2024-03-26 ENCOUNTER — CLINICAL SUPPORT (OUTPATIENT)
Dept: REHABILITATION | Facility: HOSPITAL | Age: 19
End: 2024-03-26
Payer: MEDICAID

## 2024-03-26 DIAGNOSIS — M62.81 QUADRICEPS WEAKNESS: ICD-10-CM

## 2024-03-26 DIAGNOSIS — Z98.890 S/P ACL REPAIR: Primary | ICD-10-CM

## 2024-03-26 PROCEDURE — 97110 THERAPEUTIC EXERCISES: CPT | Mod: CQ

## 2024-03-26 PROCEDURE — 97530 THERAPEUTIC ACTIVITIES: CPT | Mod: CQ

## 2024-03-26 PROCEDURE — 97112 NEUROMUSCULAR REEDUCATION: CPT | Mod: CQ

## 2024-03-26 NOTE — PROGRESS NOTES
Physical Therapy Treatment Note     Name: Cher GARCIA [facility] Number: [id number]    Therapy Diagnosis:        Encounter Diagnoses   Name Primary?    Weakness Yes    Stiffness of right knee        Physician: Geovany Vega MD    Visit Date: 3/26/2024    Physician Orders: PT Eval and Treat  Medical Diagnosis from Referral: s/p R ACL-R w/ QT autograft and lateral meniscus repair  Evaluation Date: 11/22/2023  Authorization Period Expiration: 3/31/24  Plan of Care Expiration: 3/29/24  Visit # / Visits authorized: 22/27   FOTO: 56 at IE   54 at visit 8 (12/18/23)   59 at visit 12 (1/11/24)   86 at visit 21 (3/22/24)    PTA Visit #: 3/5    Time In: 10:57 AM  Time Out:      11:40 AM  Total Billable Time:    38 individual min (5 min non billed)    Precautions: Standard  Functional Level Prior to Evaluation: Chronic limitation from injury prior to surgery    Subjective     Pt reports: doing pretty good this morning. No new complaints.  She was compliant with home exercise program.  Response to previous treatment: not much change  Functional change: ambulating independent w/out brace or assistive device     Pain: 0/10  Location: Diffuse R knee     Objective     Observation : Arrived Full WB without brace or deficit       Range of Motion/Strength :         Left Extremity                                                                        Right Extremity   AROM Strength  Location  AROM   Strength   145  5/5   Knee    Flexion (140)  130 4/5    +5  5/5                Extension (0) +5 4/5         Functional Impairments :    Ambulates full WB without brace  and no deficit  Able to negotiate stairs with reciprocal pattern and no handrails  Able to perform bodyweight squat without pain  Able to perform functional ADL at this time.       Treatment     Cher received therapeutic exercises to develop strength, endurance, ROM, flexibility, and posture for 15 individual minutes including:  Prone HS Curls--3 plates--3 x  "10  Slant Board Stretch [id number]  Heel Drop stretch--1 min x 2  Calf Raise--3 x 10 --10#  Maria Isabel stretch--throughout session    (Not Today)  Wall Ball Squats 2 x 10  HS stretch with strap--10 x 10 sec  Supine ball flexion rolls [id number]  Prone Quad Sets [id number]  Prone quad stretch--3 x 30 sec  Cher received the following manual therapy techniques: Joint mobilizations, Myofacial release, and Soft tissue Mobilization were applied to the: R knee for  0 minutes, including:    Cher participated in neuromuscular re-education activities to improve: Balance, Coordination, Kinesthetic, Sense, Proprioception, and Posture for   15 individual minutes. The following activities were included:  Seated Ext-2 plate--SL--3 x 5 - very challenging   Split Squats w/TRX 3x5 each  Lateral eccentric lowering--6"--3 x 10  Forward eccentric lowering--6"--3 x 10  Squats on slant board w/ blue band--3 x 5--10#  SL squat--TRX straps-3 x 5  Walking lunges-2 laps  Step ups with SLS--12"--10# dbs each hand  Heel elevated squats--15# kb--3 x 10    (Not Today)  Forward step ups--8"--3 x 5  SLR--3 x 10  Wall sits--10 sec x 5  Heel with QS and shift--10 x 10 sec.  DL Leg Press 3 x 10 - 8 plates  SL Leg Press 3x10 - 5 plates     Therapeutic Activity for 8 individual minutes including:  Elliptical x 5 minutes  Knee hugs x 2 lap   Sweep the grounds - active hamstring x 1 lap  Side lunge side lunge squat/pivot x 1 lap      (Not Today)  POGO hops - fwd / lateral x 1 lap each   Depth drops--  Step off sticks with 8 in step x 10 each (B)  CMJ from low mat--15x  Reverse lunges TRX x 10 each side  TRX squats to chair 3 x 10 - cueing to not shift weight to the left    Cher received the following supervised modalities after being cleared for contradictions: NMES Electrical Stimulation:  Cher received NMES Electrical Stimulation to elicit muscle contraction of the R Quad. Pt received stimulation at a rate of 50 pps with symmetric current, ramp of " "2 seconds with 10 second on time and 20 second off time. Performed for  0 min w/ 8 min in propped extension with MHP and 8# cuff weight followed by 4 min SLR.  Patient tolerated treatment well without any adverse effects.     Home Exercises Provided and Patient Education Provided     Education provided: Review of HEP and need to get knee fully extended    Written Home Exercises Provided: Patient instructed to cont prior HEP.  Exercises were reviewed and Cher was able to demonstrate them prior to the end of the session.  Cher demonstrated good  understanding of the education provided.     See EMR under Patient Instructions for exercises provided prior visit.    Assessment     No complaints on arrival.   Still working on strengthening the right lower extremity at this point. Continue to show her activities that she can do @ the gym.    Will DC to gym next appt.      Cher Is progressing well towards her goals.   Pt prognosis is Good.     Pt will continue to benefit from skilled outpatient physical therapy to address the deficits listed in the problem list box on initial evaluation, provide pt/family education and to maximize pt's level of independence in the home and community environment.     Pt's spiritual, cultural and educational needs considered and pt agreeable to plan of care and goals.     Anticipated barriers to physical therapy: none    Goals:  Short Term Goals: 6 weeks   Pt will be independent with HEP for continued progression beyond skilled therapy.--Compliant--MET  Pt will be able to ambulate I without deviation or quad avoidance gait on R.--No deviation--MET  Pt will be able to negotiate stairs reciprocally without pain or deviation on R.--able to go up/down reciprocally without rails--MET   Pt will be able to perform an SLR on R without lag to demonstrate improved terminal knee extension control.--MET     Long Term Goals: 12 weeks   Pt will be able to perform a lateral step down from an 8" " "step without pain or limitation from R knee. --Able to do 6"--MET  Pt will be able to return to running activities without pain or limitation from R knee.--DEFERRED  Pt will be independent with strengthening activities in the gym on her own for safe discharge. --Going to gym--MET  Pt will be able to perform a prone heel to butt without increased pain in R knee. Prone knee flexion on right lower extremity equal to left--MET  Pt will be able to perform a bodyweight squat without pain or limitation from R knee. Able to do the squat but requires cueing for correction of shift--PROGRESSING    Plan      Updated Certification Period: 3/22/2024 to 3/29/34  Recommended Treatment Plan: 1 times per week for 1 week: Electrical Stimulation NMES, Gait Training, Manual Therapy, Moist Heat/ Ice, Neuromuscular Re-ed, Patient Education, Therapeutic Activities, and Therapeutic Exercise       Jose Espinoza, PTA  3/26/2024          "

## 2024-03-28 ENCOUNTER — CLINICAL SUPPORT (OUTPATIENT)
Dept: REHABILITATION | Facility: HOSPITAL | Age: 19
End: 2024-03-28
Payer: MEDICAID

## 2024-03-28 DIAGNOSIS — M62.81 QUADRICEPS WEAKNESS: ICD-10-CM

## 2024-03-28 DIAGNOSIS — Z98.890 S/P ACL REPAIR: Primary | ICD-10-CM

## 2024-03-28 PROCEDURE — 97530 THERAPEUTIC ACTIVITIES: CPT | Mod: CQ

## 2024-03-28 PROCEDURE — 97110 THERAPEUTIC EXERCISES: CPT | Mod: CQ

## 2024-03-28 PROCEDURE — 97112 NEUROMUSCULAR REEDUCATION: CPT | Mod: CQ

## 2024-03-28 NOTE — PROGRESS NOTES
Physical Therapy Treatment and Discharge Note     Name: Cher GARCIA Lebo  Clinic Number: [id number]    Therapy Diagnosis:        Encounter Diagnoses   Name Primary?    Weakness Yes    Stiffness of right knee        Physician: Geovany Vega MD    Visit Date: 3/26/2024    Physician Orders: PT Eval and Treat  Medical Diagnosis from Referral: s/p R ACL-R w/ QT autograft and lateral meniscus repair  Evaluation Date: 11/22/2023  Authorization Period Expiration: 3/31/24  Plan of Care Expiration: 3/29/24  Visit # / Visits authorized: 23/27   FOTO: 56 at IE   54 at visit 8 (12/18/23)   59 at visit 12 (1/11/24)   86 at visit 21 (3/22/24)   70 at DC    PTA Visit #: 4/5    Time In: 10:54 AM  Time Out:      11:38  AM  Total Billable Time:    38 individual min ( 6 min non billed)    Precautions: Standard  Functional Level Prior to Evaluation: Chronic limitation from injury prior to surgery    Subjective     Pt reports: doing pretty good this morning. No new complaints.  She was compliant with home exercise program.  Response to previous treatment: not much change  Functional change: ambulating independent w/out brace or assistive device     Pain: 0/10  Location: Diffuse R knee     Objective     Observation : Arrived Full WB without brace or deficit       Range of Motion/Strength :         Left Extremity                                                                        Right Extremity   AROM Strength  Location  AROM   Strength   145  5/5   Knee    Flexion (140)  130 4+/5    +5  5/5                Extension (0) +5 4+/5         Functional Impairments :    Ambulates full WB without deficit  Able to negotiate stairs with reciprocal pattern and no handrails  Able to perform bodyweight squat without pain  Able to perform functional ADL at this time.       LSI on 3/28/24    Treatment     Cher received therapeutic exercises to develop strength, endurance, ROM, flexibility, and posture for 15 individual minutes including:  Prone  "HS Curls--3 plates--3 x 10  Slant Board Stretch [id number]  Heel Drop stretch--1 min x 2  Calf Raise--3 x 10 --10#  Maria Isabel stretch--throughout session    (Not Today)  Wall Ball Squats 2 x 10  HS stretch with strap--10 x 10 sec  Supine ball flexion rolls [id number]  Prone Quad Sets [id number]  Prone quad stretch--3 x 30 sec  Cher received the following manual therapy techniques: Joint mobilizations, Myofacial release, and Soft tissue Mobilization were applied to the: R knee for  0 minutes, including:    Cher participated in neuromuscular re-education activities to improve: Balance, Coordination, Kinesthetic, Sense, Proprioception, and Posture for   15 individual minutes. The following activities were included:  Seated Ext-2 plate--SL--3 x 5 - very challenging   Split Squats w/TRX 3x5 each  Lateral eccentric lowering--6"--3 x 10  Forward eccentric lowering--6"--3 x 10  Squats on slant board w/ blue band--3 x 5--10#  SL squat--TRX straps-3 x 5  Walking lunges-2 laps  Step ups with SLS--12"--10# dbs each hand  Heel elevated squats--15# kb--3 x 10    (Not Today)  Forward step ups--8"--3 x 5  SLR--3 x 10  Wall sits--10 sec x 5  Heel with QS and shift--10 x 10 sec.  DL Leg Press 3 x 10 - 8 plates  SL Leg Press 3x10 - 5 plates     Therapeutic Activity for 8 individual minutes including:  Elliptical x 5 minutes  Knee hugs x 2 lap   Sweep the grounds - active hamstring x 1 lap  Side lunge side lunge squat/pivot x 1 lap      (Not Today)  POGO hops - fwd / lateral x 1 lap each   Depth drops--  Step off sticks with 8 in step x 10 each (B)  CMJ from low mat--15x  Reverse lunges TRX x 10 each side  TRX squats to chair 3 x 10 - cueing to not shift weight to the left    Cher received the following supervised modalities after being cleared for contradictions: NMES Electrical Stimulation:  Cher received NMES Electrical Stimulation to elicit muscle contraction of the R Quad. Pt received stimulation at a rate of 50 pps with " "symmetric current, ramp of 2 seconds with 10 second on time and 20 second off time. Performed for  0 min w/ 8 min in propped extension with MHP and 8# cuff weight followed by 4 min SLR.  Patient tolerated treatment well without any adverse effects.     Home Exercises Provided and Patient Education Provided     Education provided: Review of HEP and need to get knee fully extended    Written Home Exercises Provided: Patient instructed to cont prior HEP.  Exercises were reviewed and Cher was able to demonstrate them prior to the end of the session.  Cher demonstrated good  understanding of the education provided.     See EMR under Patient Instructions for exercises provided prior visit.    Assessment     No complaints on arrival.   Patient has met all goals and will be DC from PT.  Her LSI grew significantly from last time.  Will continue working out in gym to continue strengthening.        Cher Is progressing well towards her goals.   Pt prognosis is Good.     Pt's spiritual, cultural and educational needs considered and pt agreeable to plan of care and goals.     Anticipated barriers to physical therapy: none    Goals:  Short Term Goals: 6 weeks   Pt will be independent with HEP for continued progression beyond skilled therapy.--Compliant--MET  Pt will be able to ambulate I without deviation or quad avoidance gait on R.--No deviation--MET  Pt will be able to negotiate stairs reciprocally without pain or deviation on R.--able to go up/down reciprocally without rails--MET   Pt will be able to perform an SLR on R without lag to demonstrate improved terminal knee extension control.--MET     Long Term Goals: 12 weeks   Pt will be able to perform a lateral step down from an 8" step without pain or limitation from R knee. --Able to do 6"--MET  Pt will be able to return to running activities without pain or limitation from R knee.--DEFERRED  Pt will be independent with strengthening activities in the gym on her " own for safe discharge. --Going to gym--MET  Pt will be able to perform a prone heel to butt without increased pain in R knee. Prone knee flexion on right lower extremity equal to left--MET  Pt will be able to perform a bodyweight squat without pain or limitation from R knee. Able to--MET    Plan      Patient has met all gaols and will be DC form PT    Jose Espinoza, PTA  3/26/2024

## 2024-05-07 DIAGNOSIS — L30.9 ECZEMA, UNSPECIFIED TYPE: ICD-10-CM

## 2024-05-07 RX ORDER — CETIRIZINE HYDROCHLORIDE 10 MG/1
10 TABLET ORAL NIGHTLY
Qty: 90 TABLET | Refills: 0 | Status: SHIPPED | OUTPATIENT
Start: 2024-05-07 | End: 2025-05-07

## 2024-06-13 RX ORDER — TRIAMCINOLONE ACETONIDE 1 MG/G
1 OINTMENT TOPICAL 2 TIMES DAILY
Qty: 80 G | Refills: 0 | Status: SHIPPED | OUTPATIENT
Start: 2024-06-13

## 2024-07-25 RX ORDER — TRIAMCINOLONE ACETONIDE 1 MG/G
1 OINTMENT TOPICAL 2 TIMES DAILY
Qty: 80 G | Refills: 0 | OUTPATIENT
Start: 2024-07-25

## 2024-07-26 ENCOUNTER — OFFICE VISIT (OUTPATIENT)
Dept: FAMILY MEDICINE | Facility: CLINIC | Age: 19
End: 2024-07-26

## 2024-07-26 VITALS
HEART RATE: 86 BPM | TEMPERATURE: 99 F | RESPIRATION RATE: 20 BRPM | OXYGEN SATURATION: 98 % | SYSTOLIC BLOOD PRESSURE: 108 MMHG | HEIGHT: 64 IN | WEIGHT: 182 LBS | DIASTOLIC BLOOD PRESSURE: 73 MMHG | BODY MASS INDEX: 31.07 KG/M2

## 2024-07-26 DIAGNOSIS — L30.9 ECZEMA, UNSPECIFIED TYPE: ICD-10-CM

## 2024-07-26 PROBLEM — Z98.890 S/P ACL REPAIR: Status: RESOLVED | Noted: 2023-11-27 | Resolved: 2024-07-26

## 2024-07-26 PROBLEM — S83.511A RUPTURE OF ANTERIOR CRUCIATE LIGAMENT OF RIGHT KNEE: Status: RESOLVED | Noted: 2023-11-20 | Resolved: 2024-07-26

## 2024-07-26 PROBLEM — S83.211A BUCKET-HANDLE TEAR OF MEDIAL MENISCUS OF RIGHT KNEE AS CURRENT INJURY: Status: RESOLVED | Noted: 2023-11-20 | Resolved: 2024-07-26

## 2024-07-26 RX ORDER — CETIRIZINE HYDROCHLORIDE 10 MG/1
10 TABLET ORAL NIGHTLY
Qty: 90 TABLET | Refills: 3 | Status: SHIPPED | OUTPATIENT
Start: 2024-07-26 | End: 2025-07-26

## 2024-07-26 RX ORDER — TRIAMCINOLONE ACETONIDE 1 MG/G
1 OINTMENT TOPICAL 2 TIMES DAILY
Qty: 80 G | Refills: 1 | Status: SHIPPED | OUTPATIENT
Start: 2024-07-26

## 2024-07-26 NOTE — PROGRESS NOTES
[facility]        PATIENT NAME: Cher Ramos  : 2005  DATE: 24  MRN: [id number]      Patient PCP Information       Provider PCP Type    [facility]            Reason for Visit / Chief Complaint: Rash (Arms, legs, behind neck. )       Update PCP  Update Chief Complaint         History of Present Illness / Problem Focused Workflow     Cher Ramos presents to the clinic with Rash (Arms, legs, behind neck. )     Rash      Ms Ramos presents with eczema flare to hands breast and neck. Patient in need of refill on triamcionolone ointment and zyrtec.       Medical / Social / Family History     Past Medical History:   Diagnosis Date    Allergy     Eczema        Past Surgical History:   Procedure Laterality Date    ARTHROSCOPY,KNEE,WITH MENISCUS REPAIR Right 2023    Procedure: ARTHROSCOPY,KNEE,WITH MENISCUS REPAIR;  Surgeon: Geovany Vega MD;  Location: [facility];  Service: Orthopedics;  Laterality: Right;    KNEE ARTHROSCOPY W/ ACL RECONSTRUCTION Right 2023    Procedure: RECONSTRUCTION, KNEE, ACL, ARTHROSCOPIC;  Surgeon: Geovany Vega MD;  Location: [facility];  Service: Orthopedics;  Laterality: Right;    TONSILLECTOMY      WISDOM TOOTH EXTRACTION Bilateral        Social History  Ms.  reports that she has never smoked. She has been exposed to tobacco smoke. She has never used smokeless tobacco. She reports that she does not drink alcohol and does not use drugs.    Family History  Ms.'s family history includes Diabetes in her maternal uncle; Hypertension in her maternal grandmother; Sickle cell trait in her maternal aunt, maternal grandfather, maternal uncle, and mother.    Medications and Allergies     Medications  Outpatient Medications Marked as Taking for the 24 encounter (Office Visit) with Tracie Weinstein FNP   Medication Sig Dispense Refill    [DISCONTINUED] cetirizine (ZYRTEC) 10 MG tablet Take 1 tablet (10 mg total) by  "mouth every evening. (Patient taking differently: Take 10 mg by mouth as needed for Allergies.) 90 tablet 0    [DISCONTINUED] triamcinolone acetonide 0.1% (KENALOG) 0.1 % ointment Apply 1 application  topically 2 (two) times daily. Apply to affected area 80 g 0       Allergies  Review of patient's allergies indicates:  No Known Allergies    Physical Examination     Vitals:    07/26/24 1100   BP: 108/73   BP Location: Left arm   Patient Position: Sitting   BP Method: Medium (Automatic)   Pulse: 86   Resp: 20   Temp: 99 °F (37.2 °C)   TempSrc: Oral   SpO2: 98%   Weight: 82.6 kg (182 lb)   Height: 5' 4" (1.626 m)       Physical Exam  Vitals reviewed.   Constitutional:       Appearance: She is obese.   HENT:      Head: Normocephalic.      Right Ear: External ear normal.      Left Ear: External ear normal.      Nose: Nose normal.      Mouth/Throat:      Mouth: Mucous membranes are moist.   Eyes:      Extraocular Movements: Extraocular movements intact.   Cardiovascular:      Rate and Rhythm: Normal rate.      Pulses: Normal pulses.   Pulmonary:      Effort: Pulmonary effort is normal.   Musculoskeletal:         General: Normal range of motion.      Cervical back: Normal range of motion.   Skin:     General: Skin is warm and dry.      Capillary Refill: Capillary refill takes less than 2 seconds.      Comments: Dry scaly patches to neck and breast.   Finger with dry patches and cracks to knuckles. Underarms also dry and scaly as well.    Neurological:      General: No focal deficit present.      Mental Status: She is alert and oriented to person, place, and time.   Psychiatric:         Mood and Affect: Mood normal.         Behavior: Behavior normal.         Thought Content: Thought content normal.         Judgment: Judgment normal.           No visits with results within 14 Day(s) from this visit.   Latest known visit with results is:   Office Visit on 12/13/2023   Component Date Value Ref Range Status    Chlamydia by PCR " 12/13/2023 Negative  Negative, Invalid Final    N. gonorrhoeae (GC) by PCR 12/13/2023 Negative  Negative, Invalid Final    Candida Species 12/13/2023 Positive (A)  Negative, Invalid Final    Gardnerella 12/13/2023 Positive (A)  Negative, Invalid Final    Trichomonas 12/13/2023 Negative  Negative, Invalid Final             Assessment and Plan (including Health Maintenance)      Problem List  Smart Sets  Document Outside HM   :    Plan:     1. Eczema, unspecified type  -     triamcinolone acetonide 0.1% (KENALOG) 0.1 % ointment; Apply 1 application  topically 2 (two) times daily. Apply to affected area  Dispense: 80 g; Refill: 1  -     cetirizine (ZYRTEC) 10 MG tablet; Take 1 tablet (10 mg total) by mouth every evening.  Dispense: 90 tablet; Refill: 3      RTC for wellness next month  There are no Patient Instructions on file for this visit.       Health Maintenance Due   Topic Date Due    Pneumococcal Vaccines (Age 0-64) (1 of 2 - PCV) Never done    COVID-19 Vaccine (1 - 2023-24 season) Never done       Most Recent Immunizations   Administered Date(s) Administered    DTaP 02/25/2008    DTaP / Hep B / IPV 07/13/2004    DTaP / HiB 02/08/2005    HPV 9-Valent 09/28/2021    Hepatitis A 04/20/2009    Hepatitis A, Pediatric/Adolescent, 2 Dose 02/23/2009    MMR 02/25/2008    Meningococcal B, recombinant 09/06/2022    Meningococcal Conjugate (MCV4O) 2 Vial (2mo-55yr) 09/28/2021    Meningococcal Conjugate (MCV4P) 07/20/2017    PPD Test 10/11/2022    Tdap 07/20/2017    Varicella 02/25/2008            Health Maintenance Topics with due status: Not Due       Topic Last Completion Date    TETANUS VACCINE 07/20/2017    Chlamydia Screening 12/13/2023    Influenza Vaccine Not Due       Future Appointments   Date Time Provider Department Center   8/19/2024 10:30 AM Tracie Weinstein FNP [facility]            Signature:  [facility]  [facility]     Date of encounter: 7/26/24

## 2024-08-26 ENCOUNTER — LAB VISIT (OUTPATIENT)
Dept: PRIMARY CARE CLINIC | Facility: CLINIC | Age: 19
End: 2024-08-26

## 2024-08-26 DIAGNOSIS — Z02.83 ENCOUNTER FOR DRUG SCREENING: Primary | ICD-10-CM

## 2024-08-26 PROCEDURE — 99000 SPECIMEN HANDLING OFFICE-LAB: CPT | Mod: ,,, | Performed by: NURSE PRACTITIONER

## 2024-08-26 NOTE — PROGRESS NOTES
Subjective     Patient ID: Cher Ramos is a 18 y.o. female.    Chief Complaint: No chief complaint on file.    HPI  Review of Systems       Objective     Physical Exam       Assessment and Plan     1. Encounter for drug screening        Drug testing only           No follow-ups on file.

## 2024-09-16 ENCOUNTER — OFFICE VISIT (OUTPATIENT)
Dept: FAMILY MEDICINE | Facility: CLINIC | Age: 19
End: 2024-09-16

## 2024-09-16 VITALS
WEIGHT: 181 LBS | RESPIRATION RATE: 20 BRPM | SYSTOLIC BLOOD PRESSURE: 123 MMHG | TEMPERATURE: 99 F | HEIGHT: 64 IN | HEART RATE: 93 BPM | BODY MASS INDEX: 30.9 KG/M2 | OXYGEN SATURATION: 95 % | DIASTOLIC BLOOD PRESSURE: 80 MMHG

## 2024-09-16 DIAGNOSIS — E66.9 CLASS 1 OBESITY WITH BODY MASS INDEX (BMI) OF 31.0 TO 31.9 IN ADULT, UNSPECIFIED OBESITY TYPE, UNSPECIFIED WHETHER SERIOUS COMORBIDITY PRESENT: ICD-10-CM

## 2024-09-16 DIAGNOSIS — Z00.00 ROUTINE GENERAL MEDICAL EXAMINATION AT A HEALTH CARE FACILITY: Primary | ICD-10-CM

## 2024-09-16 DIAGNOSIS — Z13.1 SCREENING FOR DIABETES MELLITUS: ICD-10-CM

## 2024-09-16 DIAGNOSIS — Z23 NEEDS FLU SHOT: ICD-10-CM

## 2024-09-16 DIAGNOSIS — Z13.220 SCREENING FOR LIPID DISORDERS: ICD-10-CM

## 2024-09-16 DIAGNOSIS — L30.9 ECZEMA, UNSPECIFIED TYPE: ICD-10-CM

## 2024-09-16 PROBLEM — E66.811 CLASS 1 OBESITY WITH BODY MASS INDEX (BMI) OF 31.0 TO 31.9 IN ADULT: Status: ACTIVE | Noted: 2021-09-28

## 2024-09-16 LAB
ALBUMIN SERPL BCP-MCNC: 3.8 G/DL (ref 3.5–5)
ALBUMIN/GLOB SERPL: 0.8 {RATIO}
ALP SERPL-CCNC: 99 U/L (ref 52–144)
ALT SERPL W P-5'-P-CCNC: 17 U/L (ref 13–56)
ANION GAP SERPL CALCULATED.3IONS-SCNC: 8 MMOL/L (ref 7–16)
AST SERPL W P-5'-P-CCNC: 14 U/L (ref 15–37)
BASOPHILS # BLD AUTO: 0.02 K/UL (ref 0–0.2)
BASOPHILS NFR BLD AUTO: 0.2 % (ref 0–1)
BILIRUB SERPL-MCNC: 0.4 MG/DL (ref ?–1.2)
BUN SERPL-MCNC: 9 MG/DL (ref 7–18)
BUN/CREAT SERPL: 13 (ref 6–20)
CALCIUM SERPL-MCNC: 9.4 MG/DL (ref 8.5–10.1)
CHLORIDE SERPL-SCNC: 105 MMOL/L (ref 98–107)
CHOLEST SERPL-MCNC: 144 MG/DL (ref 0–200)
CHOLEST/HDLC SERPL: 2.4 {RATIO}
CO2 SERPL-SCNC: 28 MMOL/L (ref 21–32)
CREAT SERPL-MCNC: 0.72 MG/DL (ref 0.55–1.02)
DIFFERENTIAL METHOD BLD: ABNORMAL
EGFR (NO RACE VARIABLE) (RUSH/TITUS): 123 ML/MIN/1.73M2
EOSINOPHIL # BLD AUTO: 0.34 K/UL (ref 0–0.5)
EOSINOPHIL NFR BLD AUTO: 3.8 % (ref 1–4)
ERYTHROCYTE [DISTWIDTH] IN BLOOD BY AUTOMATED COUNT: 13.9 % (ref 11.5–14.5)
GLOBULIN SER-MCNC: 4.5 G/DL (ref 2–4)
GLUCOSE SERPL-MCNC: 81 MG/DL (ref 74–106)
HCT VFR BLD AUTO: 42.2 % (ref 38–47)
HDLC SERPL-MCNC: 59 MG/DL (ref 40–60)
HGB BLD-MCNC: 13.6 G/DL (ref 12–16)
IMM GRANULOCYTES # BLD AUTO: 0.04 K/UL (ref 0–0.04)
IMM GRANULOCYTES NFR BLD: 0.4 % (ref 0–0.4)
LDLC SERPL CALC-MCNC: 74 MG/DL
LDLC/HDLC SERPL: 1.3 {RATIO}
LYMPHOCYTES # BLD AUTO: 1.13 K/UL (ref 1–4.8)
LYMPHOCYTES NFR BLD AUTO: 12.6 % (ref 27–41)
MCH RBC QN AUTO: 28.6 PG (ref 27–31)
MCHC RBC AUTO-ENTMCNC: 32.2 G/DL (ref 32–36)
MCV RBC AUTO: 88.8 FL (ref 80–96)
MONOCYTES # BLD AUTO: 0.51 K/UL (ref 0–0.8)
MONOCYTES NFR BLD AUTO: 5.7 % (ref 2–6)
MPC BLD CALC-MCNC: 12.1 FL (ref 9.4–12.4)
NEUTROPHILS # BLD AUTO: 6.91 K/UL (ref 1.8–7.7)
NEUTROPHILS NFR BLD AUTO: 77.3 % (ref 53–65)
NONHDLC SERPL-MCNC: 85 MG/DL
NRBC # BLD AUTO: 0 X10E3/UL
NRBC, AUTO (.00): 0 %
PLATELET # BLD AUTO: 246 K/UL (ref 150–400)
POTASSIUM SERPL-SCNC: 3.8 MMOL/L (ref 3.5–5.1)
PROT SERPL-MCNC: 8.3 G/DL (ref 6.4–8.2)
RBC # BLD AUTO: 4.75 M/UL (ref 4.2–5.4)
SODIUM SERPL-SCNC: 137 MMOL/L (ref 136–145)
TRIGL SERPL-MCNC: 55 MG/DL (ref 35–150)
VLDLC SERPL-MCNC: 11 MG/DL
WBC # BLD AUTO: 8.95 K/UL (ref 4.5–11)

## 2024-09-16 PROCEDURE — 80061 LIPID PANEL: CPT | Mod: ,,, | Performed by: CLINICAL MEDICAL LABORATORY

## 2024-09-16 PROCEDURE — 90471 IMMUNIZATION ADMIN: CPT | Mod: ,,, | Performed by: NURSE PRACTITIONER

## 2024-09-16 PROCEDURE — 85025 COMPLETE CBC W/AUTO DIFF WBC: CPT | Mod: ,,, | Performed by: CLINICAL MEDICAL LABORATORY

## 2024-09-16 PROCEDURE — 90656 IIV3 VACC NO PRSV 0.5 ML IM: CPT | Mod: ,,, | Performed by: NURSE PRACTITIONER

## 2024-09-16 PROCEDURE — 80053 COMPREHEN METABOLIC PANEL: CPT | Mod: ,,, | Performed by: CLINICAL MEDICAL LABORATORY

## 2024-09-16 PROCEDURE — 99395 PREV VISIT EST AGE 18-39: CPT | Mod: 25,,, | Performed by: NURSE PRACTITIONER

## 2024-09-16 NOTE — PROGRESS NOTES
FREDO Wilkins        PATIENT NAME: Cher Ramos  : 2005  DATE: 24  MRN: [id number]      Patient PCP Information       Provider PCP Type    Tracie ALEXANDRE [facility]          Chief Complaint      Chief Complaint   Patient presents with    Annual Exam       History of Present Illness        Cher Ramos is a 18 y.o. female who presents for routine wellness visit. Pt states she is doing well with no acute complaints. Pt denies  FHx of breast, colon, or cervical cancer. Last colonoscopy was never with 45 year old screening recs. Last mammogram was never, recommend at 38 years old. Last pap was never, recommend at 18 yo.  flu shot ordered. Nonsmoker. Working on improving diet and exercise.    Past Medical History:  Past Medical History:   Diagnosis Date    Allergy     Eczema        Past Surgical History:   has a past surgical history that includes [place] tooth extraction (Bilateral); Tonsillectomy; Knee arthroscopy w/ ACL reconstruction (Right, 2023); and arthroscopy,knee,with meniscus repair (Right, 2023).    Social History:  Social History     Tobacco Use    Smoking status: Never     Passive exposure: Current    Smokeless tobacco: Never   Substance Use Topics    Alcohol use: Never    Drug use: Never       I personally reviewed all past medical, surgical, and social.     Review of Systems   Constitutional:  Negative for activity change, appetite change, chills, diaphoresis, fatigue, fever and unexpected weight change.   HENT:  Negative for congestion, ear pain, facial swelling, hearing loss, nosebleeds and sore throat.    Eyes: Negative.    Respiratory:  Negative for apnea, cough, shortness of breath and wheezing.    Cardiovascular:  Negative for chest pain, palpitations and leg swelling.   Gastrointestinal:  Negative for abdominal distention, abdominal pain, blood in stool, constipation, diarrhea and nausea.   Endocrine: Negative for cold intolerance, heat intolerance,  polydipsia, polyphagia and polyuria.   Genitourinary:  Negative for decreased urine volume, difficulty urinating, dysuria, flank pain, frequency, hematuria and urgency.   Musculoskeletal:  Negative for arthralgias, joint swelling and myalgias.   Skin:  Negative for color change and rash.   Allergic/Immunologic: Negative.    Neurological:  Negative for dizziness, tremors, seizures, syncope, facial asymmetry, speech difficulty, weakness, light-headedness, numbness and headaches.   Hematological:  Negative for adenopathy. Does not bruise/bleed easily.   Psychiatric/Behavioral:  Negative for behavioral problems and confusion.         Medications:  Outpatient Encounter Medications as of 9/16/2024   Medication Sig Dispense Refill    cetirizine (ZYRTEC) 10 MG tablet Take 1 tablet (10 mg total) by mouth every evening. 90 tablet 3    triamcinolone acetonide 0.1% (KENALOG) 0.1 % ointment Apply 1 application  topically 2 (two) times daily. Apply to affected area 80 g 1     Facility-Administered Encounter Medications as of 9/16/2024   Medication Dose Route Frequency Provider Last Rate Last Admin    influenza (Flulaval, Fluzone, Fluarix) 45 mcg/0.5 mL IM vaccine (> or = 6 mo) 0.5 mL  0.5 mL Intramuscular 1 time in Clinic/HOD Tracie Weinstein FNP           Allergies:  Review of patient's allergies indicates:  No Known Allergies    Health Maintenance:  Immunization History   Administered Date(s) Administered    DTaP 02/25/2008    DTaP / Hep B / IPV 02/09/2004, 05/17/2004, 07/13/2004    DTaP / HiB 02/08/2005    HPV 9-Valent 07/20/2017, 09/28/2021    Hepatitis A 04/20/2009    Hepatitis A, Pediatric/Adolescent, 2 Dose 08/12/2008, 02/23/2009    MMR 02/08/2005, 02/25/2008    Meningococcal B, recombinant 09/28/2021, 09/06/2022    Meningococcal Conjugate (MCV4O) 2 Vial (2mo-55yr) 09/28/2021    Meningococcal Conjugate (MCV4P) 07/20/2017    PPD Test 10/11/2022    Tdap 07/20/2017    Varicella 02/08/2005, 02/25/2008      Health Maintenance  "  Topic Date Due    Chlamydia Screening  12/13/2024    TETANUS VACCINE  07/20/2027    Hepatitis C Screening  Completed    Lipid Panel  Completed    HPV Vaccines  Completed        Physical Exam      Vital Signs  Temp: 99.4 °F (37.4 °C)  Temp Source: Oral  Pulse: 93  Resp: 20  SpO2: 95 %  BP: 123/80  BP Location: Right arm  Patient Position: Sitting  Pain Score: 0-No pain  Height and Weight  Height: 5' 4" (162.6 cm)  Weight: 82.1 kg (181 lb)  BSA (Calculated - sq m): 1.93 sq meters  BMI (Calculated): 31.1  Weight in (lb) to have BMI = 25: 145.3]  Over the last two weeks how often have you been bothered by little interest or pleasure in doing things: 0  Over the last two weeks how often have you been bothered by feeling down, depressed or hopeless: 0  PHQ-2 Total Score: 0  PHQ-9 Score: 0  PHQ-9 Interpretation: Minimal or None      Physical Exam  Vitals reviewed.   HENT:      Head: Normocephalic.      Right Ear: External ear normal.      Left Ear: External ear normal.      Nose: Nose normal.      Mouth/Throat:      Mouth: Mucous membranes are moist.   Eyes:      Extraocular Movements: Extraocular movements intact.   Cardiovascular:      Rate and Rhythm: Normal rate and regular rhythm.      Pulses: Normal pulses.   Pulmonary:      Effort: Pulmonary effort is normal.   Abdominal:      Palpations: Abdomen is soft.   Musculoskeletal:         General: Normal range of motion.      Cervical back: Normal range of motion.   Skin:     General: Skin is warm and dry.      Capillary Refill: Capillary refill takes less than 2 seconds.   Neurological:      General: No focal deficit present.      Mental Status: She is alert and oriented to person, place, and time.   Psychiatric:         Mood and Affect: Mood normal.         Behavior: Behavior normal.         Thought Content: Thought content normal.         Judgment: Judgment normal.              Is patient >64 yo of age?      11/20/2023     5:59 AM   Fall Risk Assessment   History Of " Fall (W/I 3 Mos) 0-->No   Polypharmacy 0-->No   Central Nervous System/Psychotropic Medication 0-->No   Cardiovascular Medication 0-->No   Age Greater Than 65 Years 0-->No   Altered Elimination 0-->No   Cognitive Deficit 0-->No   Sensory Deficit 0-->No   Dizziness/Vertigo 0-->No   Depression 0-->No   Mobility Deficit/Weakness 0-->No   Male 0-->No   Fall Risk Score 0         Laboratory:  CBC:  Recent Labs   Lab 08/16/23  0928   WBC 6.92   RBC 4.51   Hemoglobin 12.9   Hematocrit 39.1   Platelet Count 243   MCV 86.7   MCH 28.6   MCHC 33.0     CMP:  Recent Labs   Lab 08/16/23  0928   Glucose 75   Calcium 9.3   Albumin 3.6   Total Protein 7.5   Sodium 138   Potassium 4.5   CO2 28   Chloride 104   BUN 16   Alk Phos 105   ALT 25   AST 15   Bilirubin, Total 0.3     LIPIDS:  Recent Labs   Lab 09/06/22  1708 08/16/23  0928   HDL Cholesterol 60 74 H   Cholesterol 156 162   Triglycerides 54 41   LDL Calculated 85 80   Cholesterol/HDL Ratio (Risk Factor) 2.6 2.2   Non-HDL 96 88     TSH:      A1C:        Assessment/Plan     Cher Ramos is a 18 y.o.female with:     1. Routine general medical examination at a health care facility  -     Comprehensive Metabolic Panel; Future; Expected date: 09/16/2024  -     CBC Auto Differential; Future; Expected date: 09/16/2024  -     Lipid Panel; Future; Expected date: 09/16/2024    2. Needs flu shot  -     influenza (Flulaval, Fluzone, Fluarix) 45 mcg/0.5 mL IM vaccine (> or = 6 mo) 0.5 mL    3. Eczema, unspecified type  Assessment & Plan:  Triamcinolone prn  Cetirizine daily      4. Class 1 obesity with body mass index (BMI) of 31.0 to 31.9 in adult, unspecified obesity type, unspecified whether serious comorbidity present  Assessment & Plan:  Healthy diet and exercise       5. Screening for lipid disorders  -     Lipid Panel; Future; Expected date: 09/16/2024    6. Screening for diabetes mellitus  -     Comprehensive Metabolic Panel; Future; Expected date: 09/16/2024        Total time  spent face-to-face and non-face-to-face coordinating care for this encounter was: >30 min    Chronic conditions status updated as per HPI.  Other than changes above, cont current medications and maintain follow up with specialists.  Return to clinic 1 year next wellness with angela REYES

## 2024-09-27 NOTE — LETTER
August 21, 2022      [facility]  [address] [facility] [address]  Phone: [phone]  Fax: [phone]       Patient: Cher Ramos   YOB: 2005  Date of Visit: 08/21/2022    To Whom It May Concern:    Sekou Ramos  was at [facility] on 08/21/2022. The patient may return to work/school on 08/23/2022. If you have any questions or concerns, or if I can be of further assistance, please do not hesitate to contact me.    Sincerely,    Dr. Lawrence Ramos II     
[phone]

## 2025-01-27 ENCOUNTER — PATIENT MESSAGE (OUTPATIENT)
Dept: FAMILY MEDICINE | Facility: CLINIC | Age: 20
End: 2025-01-27

## 2025-01-31 DIAGNOSIS — L30.9 ECZEMA, UNSPECIFIED TYPE: ICD-10-CM

## 2025-01-31 RX ORDER — TRIAMCINOLONE ACETONIDE 1 MG/G
1 OINTMENT TOPICAL 2 TIMES DAILY
Qty: 80 G | Refills: 1 | Status: SHIPPED | OUTPATIENT
Start: 2025-01-31

## 2025-06-24 ENCOUNTER — TELEPHONE (OUTPATIENT)
Dept: FAMILY MEDICINE | Facility: CLINIC | Age: 20
End: 2025-06-24

## 2025-06-24 DIAGNOSIS — L30.9 ECZEMA, UNSPECIFIED TYPE: ICD-10-CM

## 2025-06-24 NOTE — TELEPHONE ENCOUNTER
Refill request sent to Mrs. Maher. Please send to [facility].     Copied from CRM #[id number]. Topic: Medications - Medication Refill  >> Jun 24, 2025 10:10 AM Tracie wrote:  Who Called: Cher Ramos    Refill or New Rx:Refill  RX Name and Strength:triamcinolone acetonide 0.1% (KENALOG) 0.1 % ointment  How is the patient currently taking it? (ex. 1XDay):  Is this a 30 day or 90 day RX:30  Local or Mail Order:local   List of preferred pharmacies on file (remove unneeded): [unfilled]  If different Pharmacy is requested, enter Pharmacy information here including location and phone number: [id number] - [place] - [address] AT [facility] [address]  & [facility]  [address] [place] [address]  Phone: [phone] Fax: [phone]  Hours: Not open 24 hours       Ordering Provider:joshua       Preferred Method of Contact: Phone Call  Patient's Preferred Phone Number on File: [phone]   Best Call Back Number, if different:  Additional Information:

## 2025-06-25 RX ORDER — TRIAMCINOLONE ACETONIDE 1 MG/G
1 OINTMENT TOPICAL 2 TIMES DAILY
Qty: 453 G | Refills: 1 | Status: SHIPPED | OUTPATIENT
Start: 2025-06-25

## 2025-06-30 ENCOUNTER — PATIENT MESSAGE (OUTPATIENT)
Dept: FAMILY MEDICINE | Facility: CLINIC | Age: 20
End: 2025-06-30

## 2025-06-30 ENCOUNTER — TELEPHONE (OUTPATIENT)
Dept: FAMILY MEDICINE | Facility: CLINIC | Age: 20
End: 2025-06-30

## 2025-06-30 NOTE — TELEPHONE ENCOUNTER
Copied from CRM #[id number]. Topic: Medications - Pharmacy  >> Jun 30, 2025 12:02 PM Med Assistant Deb wrote:  Who Called:don     Pharmacy is calling to request assistance with Rx    Pharmacy name and phone number: [facility]  Pharmacy contact: [phone]  Patient Name:   Prescription Name: triamcinolone acetonide 0.1% (KENALOG) 0.1 % ointment  What do they need to clarify?: didn't get prescription         Preferred Method of Contact: Phone Call  Patient's Preferred Phone Number on File: [phone]   Best Call Back Number, if different:  Additional Information:

## 2025-08-19 DIAGNOSIS — L30.9 ECZEMA, UNSPECIFIED TYPE: ICD-10-CM

## 2025-08-20 RX ORDER — TRIAMCINOLONE ACETONIDE 1 MG/G
1 OINTMENT TOPICAL 2 TIMES DAILY PRN
Qty: 453 G | Refills: 0 | Status: SHIPPED | OUTPATIENT
Start: 2025-08-20

## (undated) DEVICE — TUBING CROSSFLOW INFLOW CASS

## (undated) DEVICE — CANISTER SUCTION MEDI-VAC 12L

## (undated) DEVICE — GLOVE 7.0 PROTEXIS PI BLUE

## (undated) DEVICE — DRAPE INVISISHIELD U 48X52IN

## (undated) DEVICE — WAND COBLATION WEREWOLF FLOW 50

## (undated) DEVICE — PENCIL ELECTROSURG HOLST W/BLD

## (undated) DEVICE — SUT MONOCRYL 3-0 PS-2 UND

## (undated) DEVICE — KNIFE GRAFT ACL 9MM

## (undated) DEVICE — SUT FIBERWIRE 2-0 50 BLK

## (undated) DEVICE — SPONGE COTTON TRAY 4X4IN

## (undated) DEVICE — BANDAGE ACE DOUBLE STER 6IN

## (undated) DEVICE — SOL NACL IRR 3000ML

## (undated) DEVICE — NDL QUINCKE SPINAL 18G 3.5IN

## (undated) DEVICE — GLOVE 7.5 PROTEXIS PI BLUE

## (undated) DEVICE — DEVICE SUCTION H20 BROOM 12FT

## (undated) DEVICE — DRESSING GAUZE XEROFORM 5X9

## (undated) DEVICE — BLADE BONECUTTER PLAT 5.5MM

## (undated) DEVICE — COVER SURG LIGHT HANDLE

## (undated) DEVICE — Device

## (undated) DEVICE — PAD ABDOMINAL 8X7.5 STERILE

## (undated) DEVICE — BLADE INCISOR 4.5MM

## (undated) DEVICE — DRAPE ARTHSCP T ORTHOMAX POUCH

## (undated) DEVICE — SOL NACL IRR 1000ML BTL

## (undated) DEVICE — SPONGE LAP XRAY 5/PK 12X12IN

## (undated) DEVICE — ADHESIVE MASTISOL VIAL 48/BX

## (undated) DEVICE — GOWN POLY REINF BRTH SLV XL

## (undated) DEVICE — TOURNIQUET SB QC SP 34X4IN

## (undated) DEVICE — COVER PROXIMA MAYO STAND

## (undated) DEVICE — BLADE SURG #15 CARBON STEEL

## (undated) DEVICE — GLOVE BIOGEL SKINSENSE PI 6.5

## (undated) DEVICE — SHAVER SYNNOVATOR PLAT SERIES 4.5MM

## (undated) DEVICE — SUT BLU BR 2 TAPERD NDL 1/2

## (undated) DEVICE — PUSHER CUTTER KNOT FAST FX STR

## (undated) DEVICE — PACK ECLIPSE BASIC III SURG

## (undated) DEVICE — TUBE SUCTION MEDI-VAC STERILE

## (undated) DEVICE — SUT VICRYL 2-0 36 CT-1

## (undated) DEVICE — PACK KNEE ARTHROSCOPY  RUSH

## (undated) DEVICE — APPLICATOR CHLORAPREP ORN 26ML

## (undated) DEVICE — GLOVE BIOGEL SKINSENSE PI 7.0

## (undated) DEVICE — STRIP MEDI WND CLSR 1/2X4IN

## (undated) DEVICE — GLOVE BIOGEL SKINSENSE PI 7.5

## (undated) DEVICE — SYR IRRIGATION BULB STER 60ML